# Patient Record
Sex: FEMALE | Race: WHITE | NOT HISPANIC OR LATINO | URBAN - METROPOLITAN AREA
[De-identification: names, ages, dates, MRNs, and addresses within clinical notes are randomized per-mention and may not be internally consistent; named-entity substitution may affect disease eponyms.]

---

## 2020-04-16 ENCOUNTER — INPATIENT (INPATIENT)
Facility: HOSPITAL | Age: 60
LOS: 8 days | Discharge: HOME HEALTH | DRG: 137 | End: 2020-04-25
Attending: INTERNAL MEDICINE | Admitting: INTERNAL MEDICINE
Payer: MEDICAID

## 2020-04-16 VITALS
OXYGEN SATURATION: 91 % | DIASTOLIC BLOOD PRESSURE: 98 MMHG | HEART RATE: 89 BPM | SYSTOLIC BLOOD PRESSURE: 132 MMHG | WEIGHT: 169.98 LBS | TEMPERATURE: 98 F | RESPIRATION RATE: 18 BRPM | HEIGHT: 67 IN

## 2020-04-16 DIAGNOSIS — J12.9 VIRAL PNEUMONIA, UNSPECIFIED: ICD-10-CM

## 2020-04-16 LAB
ALBUMIN SERPL ELPH-MCNC: 2.3 G/DL — LOW (ref 3.3–5)
ALBUMIN SERPL ELPH-MCNC: 2.5 G/DL — LOW (ref 3.3–5)
ALP SERPL-CCNC: 81 U/L — SIGNIFICANT CHANGE UP (ref 40–120)
ALP SERPL-CCNC: 82 U/L — SIGNIFICANT CHANGE UP (ref 40–120)
ALT FLD-CCNC: 62 U/L — SIGNIFICANT CHANGE UP (ref 12–78)
ALT FLD-CCNC: 68 U/L — SIGNIFICANT CHANGE UP (ref 12–78)
ANION GAP SERPL CALC-SCNC: 6 MMOL/L — SIGNIFICANT CHANGE UP (ref 5–17)
ANION GAP SERPL CALC-SCNC: 8 MMOL/L — SIGNIFICANT CHANGE UP (ref 5–17)
APTT BLD: 38.7 SEC — HIGH (ref 27.5–36.3)
AST SERPL-CCNC: 78 U/L — HIGH (ref 15–37)
AST SERPL-CCNC: 80 U/L — HIGH (ref 15–37)
BASE EXCESS BLDV CALC-SCNC: -0.1 MMOL/L — SIGNIFICANT CHANGE UP (ref -2–2)
BASOPHILS # BLD AUTO: 0 K/UL — SIGNIFICANT CHANGE UP (ref 0–0.2)
BASOPHILS NFR BLD AUTO: 0 % — SIGNIFICANT CHANGE UP (ref 0–2)
BILIRUB SERPL-MCNC: 0.3 MG/DL — SIGNIFICANT CHANGE UP (ref 0.2–1.2)
BILIRUB SERPL-MCNC: 0.4 MG/DL — SIGNIFICANT CHANGE UP (ref 0.2–1.2)
BUN SERPL-MCNC: 10 MG/DL — SIGNIFICANT CHANGE UP (ref 7–23)
BUN SERPL-MCNC: 7 MG/DL — SIGNIFICANT CHANGE UP (ref 7–23)
CALCIUM SERPL-MCNC: 7.9 MG/DL — LOW (ref 8.5–10.1)
CALCIUM SERPL-MCNC: 8 MG/DL — LOW (ref 8.5–10.1)
CHLORIDE SERPL-SCNC: 89 MMOL/L — LOW (ref 96–108)
CHLORIDE SERPL-SCNC: 93 MMOL/L — LOW (ref 96–108)
CK SERPL-CCNC: 209 U/L — HIGH (ref 26–192)
CO2 SERPL-SCNC: 25 MMOL/L — SIGNIFICANT CHANGE UP (ref 22–31)
CO2 SERPL-SCNC: 26 MMOL/L — SIGNIFICANT CHANGE UP (ref 22–31)
CREAT SERPL-MCNC: 0.58 MG/DL — SIGNIFICANT CHANGE UP (ref 0.5–1.3)
CREAT SERPL-MCNC: 0.69 MG/DL — SIGNIFICANT CHANGE UP (ref 0.5–1.3)
CRP SERPL-MCNC: 21.49 MG/DL — HIGH (ref 0–0.4)
D DIMER BLD IA.RAPID-MCNC: 165 NG/ML DDU — SIGNIFICANT CHANGE UP
EOSINOPHIL # BLD AUTO: 0 K/UL — SIGNIFICANT CHANGE UP (ref 0–0.5)
EOSINOPHIL NFR BLD AUTO: 0 % — SIGNIFICANT CHANGE UP (ref 0–6)
FERRITIN SERPL-MCNC: 1090 NG/ML — HIGH (ref 15–150)
FIBRINOGEN PPP-MCNC: 1062 MG/DL — HIGH (ref 320–520)
GLUCOSE SERPL-MCNC: 103 MG/DL — HIGH (ref 70–99)
GLUCOSE SERPL-MCNC: 113 MG/DL — HIGH (ref 70–99)
HCO3 BLDV-SCNC: 24 MMOL/L — SIGNIFICANT CHANGE UP (ref 21–29)
HCT VFR BLD CALC: 36.7 % — SIGNIFICANT CHANGE UP (ref 34.5–45)
HCT VFR BLD CALC: 37.2 % — SIGNIFICANT CHANGE UP (ref 34.5–45)
HCV AB S/CO SERPL IA: 0.14 S/CO — SIGNIFICANT CHANGE UP (ref 0–0.99)
HCV AB SERPL-IMP: SIGNIFICANT CHANGE UP
HGB BLD-MCNC: 12.4 G/DL — SIGNIFICANT CHANGE UP (ref 11.5–15.5)
HGB BLD-MCNC: 12.5 G/DL — SIGNIFICANT CHANGE UP (ref 11.5–15.5)
INR BLD: 1.19 RATIO — HIGH (ref 0.88–1.16)
LACTATE SERPL-SCNC: 1.4 MMOL/L — SIGNIFICANT CHANGE UP (ref 0.7–2)
LDH SERPL L TO P-CCNC: 494 U/L — HIGH (ref 84–241)
LYMPHOCYTES # BLD AUTO: 0.36 K/UL — LOW (ref 1–3.3)
LYMPHOCYTES # BLD AUTO: 4 % — LOW (ref 13–44)
MAGNESIUM SERPL-MCNC: 2.1 MG/DL — SIGNIFICANT CHANGE UP (ref 1.6–2.6)
MCHC RBC-ENTMCNC: 28.4 PG — SIGNIFICANT CHANGE UP (ref 27–34)
MCHC RBC-ENTMCNC: 29.2 PG — SIGNIFICANT CHANGE UP (ref 27–34)
MCHC RBC-ENTMCNC: 33.3 GM/DL — SIGNIFICANT CHANGE UP (ref 32–36)
MCHC RBC-ENTMCNC: 34.1 GM/DL — SIGNIFICANT CHANGE UP (ref 32–36)
MCV RBC AUTO: 85.1 FL — SIGNIFICANT CHANGE UP (ref 80–100)
MCV RBC AUTO: 85.7 FL — SIGNIFICANT CHANGE UP (ref 80–100)
MONOCYTES # BLD AUTO: 0.36 K/UL — SIGNIFICANT CHANGE UP (ref 0–0.9)
MONOCYTES NFR BLD AUTO: 4 % — SIGNIFICANT CHANGE UP (ref 2–14)
NEUTROPHILS # BLD AUTO: 8.02 K/UL — HIGH (ref 1.8–7.4)
NEUTROPHILS NFR BLD AUTO: 89 % — HIGH (ref 43–77)
NRBC # BLD: SIGNIFICANT CHANGE UP /100 WBCS (ref 0–0)
PCO2 BLDV: 38 MMHG — SIGNIFICANT CHANGE UP (ref 35–50)
PH BLDV: 7.41 — SIGNIFICANT CHANGE UP (ref 7.35–7.45)
PHOSPHATE SERPL-MCNC: 3 MG/DL — SIGNIFICANT CHANGE UP (ref 2.5–4.5)
PLATELET # BLD AUTO: 218 K/UL — SIGNIFICANT CHANGE UP (ref 150–400)
PLATELET # BLD AUTO: 219 K/UL — SIGNIFICANT CHANGE UP (ref 150–400)
PO2 BLDV: 35 MMHG — SIGNIFICANT CHANGE UP (ref 25–45)
POTASSIUM SERPL-MCNC: 4.1 MMOL/L — SIGNIFICANT CHANGE UP (ref 3.5–5.3)
POTASSIUM SERPL-MCNC: 4.2 MMOL/L — SIGNIFICANT CHANGE UP (ref 3.5–5.3)
POTASSIUM SERPL-SCNC: 4.1 MMOL/L — SIGNIFICANT CHANGE UP (ref 3.5–5.3)
POTASSIUM SERPL-SCNC: 4.2 MMOL/L — SIGNIFICANT CHANGE UP (ref 3.5–5.3)
PROCALCITONIN SERPL-MCNC: 6.89 NG/ML — HIGH (ref 0.02–0.1)
PROT SERPL-MCNC: 6.9 GM/DL — SIGNIFICANT CHANGE UP (ref 6–8.3)
PROT SERPL-MCNC: 7.1 GM/DL — SIGNIFICANT CHANGE UP (ref 6–8.3)
PROTHROM AB SERPL-ACNC: 13.3 SEC — HIGH (ref 10–12.9)
RBC # BLD: 4.28 M/UL — SIGNIFICANT CHANGE UP (ref 3.8–5.2)
RBC # BLD: 4.37 M/UL — SIGNIFICANT CHANGE UP (ref 3.8–5.2)
RBC # FLD: 13.1 % — SIGNIFICANT CHANGE UP (ref 10.3–14.5)
RBC # FLD: 13.2 % — SIGNIFICANT CHANGE UP (ref 10.3–14.5)
SAO2 % BLDV: 61 % — LOW (ref 67–88)
SARS-COV-2 RNA SPEC QL NAA+PROBE: DETECTED
SODIUM SERPL-SCNC: 122 MMOL/L — LOW (ref 135–145)
SODIUM SERPL-SCNC: 125 MMOL/L — LOW (ref 135–145)
TROPONIN I SERPL-MCNC: <0.015 NG/ML — SIGNIFICANT CHANGE UP (ref 0.01–0.04)
WBC # BLD: 8.36 K/UL — SIGNIFICANT CHANGE UP (ref 3.8–10.5)
WBC # BLD: 9.01 K/UL — SIGNIFICANT CHANGE UP (ref 3.8–10.5)
WBC # FLD AUTO: 8.36 K/UL — SIGNIFICANT CHANGE UP (ref 3.8–10.5)
WBC # FLD AUTO: 9.01 K/UL — SIGNIFICANT CHANGE UP (ref 3.8–10.5)

## 2020-04-16 PROCEDURE — 86140 C-REACTIVE PROTEIN: CPT

## 2020-04-16 PROCEDURE — 83036 HEMOGLOBIN GLYCOSYLATED A1C: CPT

## 2020-04-16 PROCEDURE — 84443 ASSAY THYROID STIM HORMONE: CPT

## 2020-04-16 PROCEDURE — 85025 COMPLETE CBC W/AUTO DIFF WBC: CPT

## 2020-04-16 PROCEDURE — 83550 IRON BINDING TEST: CPT

## 2020-04-16 PROCEDURE — 83540 ASSAY OF IRON: CPT

## 2020-04-16 PROCEDURE — 84484 ASSAY OF TROPONIN QUANT: CPT

## 2020-04-16 PROCEDURE — 83880 ASSAY OF NATRIURETIC PEPTIDE: CPT

## 2020-04-16 PROCEDURE — 84466 ASSAY OF TRANSFERRIN: CPT

## 2020-04-16 PROCEDURE — 83519 RIA NONANTIBODY: CPT

## 2020-04-16 PROCEDURE — 86803 HEPATITIS C AB TEST: CPT

## 2020-04-16 PROCEDURE — 80053 COMPREHEN METABOLIC PANEL: CPT

## 2020-04-16 PROCEDURE — 84145 PROCALCITONIN (PCT): CPT

## 2020-04-16 PROCEDURE — 82728 ASSAY OF FERRITIN: CPT

## 2020-04-16 PROCEDURE — 99223 1ST HOSP IP/OBS HIGH 75: CPT

## 2020-04-16 PROCEDURE — 84439 ASSAY OF FREE THYROXINE: CPT

## 2020-04-16 PROCEDURE — 83520 IMMUNOASSAY QUANT NOS NONAB: CPT

## 2020-04-16 PROCEDURE — 71045 X-RAY EXAM CHEST 1 VIEW: CPT

## 2020-04-16 PROCEDURE — 36415 COLL VENOUS BLD VENIPUNCTURE: CPT

## 2020-04-16 PROCEDURE — 71045 X-RAY EXAM CHEST 1 VIEW: CPT | Mod: 26

## 2020-04-16 PROCEDURE — 94640 AIRWAY INHALATION TREATMENT: CPT

## 2020-04-16 PROCEDURE — 85027 COMPLETE CBC AUTOMATED: CPT

## 2020-04-16 PROCEDURE — C9113: CPT

## 2020-04-16 PROCEDURE — 85379 FIBRIN DEGRADATION QUANT: CPT

## 2020-04-16 PROCEDURE — 93005 ELECTROCARDIOGRAM TRACING: CPT

## 2020-04-16 PROCEDURE — 84100 ASSAY OF PHOSPHORUS: CPT

## 2020-04-16 PROCEDURE — 83735 ASSAY OF MAGNESIUM: CPT

## 2020-04-16 PROCEDURE — 93010 ELECTROCARDIOGRAM REPORT: CPT

## 2020-04-16 PROCEDURE — 80048 BASIC METABOLIC PNL TOTAL CA: CPT

## 2020-04-16 RX ORDER — ACETAMINOPHEN 500 MG
650 TABLET ORAL EVERY 4 HOURS
Refills: 0 | Status: DISCONTINUED | OUTPATIENT
Start: 2020-04-16 | End: 2020-04-25

## 2020-04-16 RX ORDER — PANTOPRAZOLE SODIUM 20 MG/1
40 TABLET, DELAYED RELEASE ORAL ONCE
Refills: 0 | Status: COMPLETED | OUTPATIENT
Start: 2020-04-16 | End: 2020-04-16

## 2020-04-16 RX ORDER — ALBUTEROL 90 UG/1
2 AEROSOL, METERED ORAL EVERY 4 HOURS
Refills: 0 | Status: DISCONTINUED | OUTPATIENT
Start: 2020-04-16 | End: 2020-04-25

## 2020-04-16 RX ORDER — LEVOTHYROXINE SODIUM 125 MCG
112 TABLET ORAL DAILY
Refills: 0 | Status: DISCONTINUED | OUTPATIENT
Start: 2020-04-16 | End: 2020-04-25

## 2020-04-16 RX ORDER — ENOXAPARIN SODIUM 100 MG/ML
40 INJECTION SUBCUTANEOUS DAILY
Refills: 0 | Status: DISCONTINUED | OUTPATIENT
Start: 2020-04-16 | End: 2020-04-25

## 2020-04-16 RX ORDER — HYDROXYCHLOROQUINE SULFATE 200 MG
800 TABLET ORAL EVERY 24 HOURS
Refills: 0 | Status: COMPLETED | OUTPATIENT
Start: 2020-04-16 | End: 2020-04-16

## 2020-04-16 RX ORDER — ACETAMINOPHEN 500 MG
650 TABLET ORAL EVERY 6 HOURS
Refills: 0 | Status: DISCONTINUED | OUTPATIENT
Start: 2020-04-16 | End: 2020-04-16

## 2020-04-16 RX ORDER — SODIUM CHLORIDE 9 MG/ML
500 INJECTION INTRAMUSCULAR; INTRAVENOUS; SUBCUTANEOUS ONCE
Refills: 0 | Status: COMPLETED | OUTPATIENT
Start: 2020-04-16 | End: 2020-04-16

## 2020-04-16 RX ORDER — ALBUTEROL 90 UG/1
2 AEROSOL, METERED ORAL EVERY 6 HOURS
Refills: 0 | Status: DISCONTINUED | OUTPATIENT
Start: 2020-04-16 | End: 2020-04-16

## 2020-04-16 RX ORDER — HYDROXYCHLOROQUINE SULFATE 200 MG
TABLET ORAL
Refills: 0 | Status: COMPLETED | OUTPATIENT
Start: 2020-04-16 | End: 2020-04-20

## 2020-04-16 RX ORDER — PANTOPRAZOLE SODIUM 20 MG/1
40 TABLET, DELAYED RELEASE ORAL DAILY
Refills: 0 | Status: DISCONTINUED | OUTPATIENT
Start: 2020-04-16 | End: 2020-04-18

## 2020-04-16 RX ORDER — ONDANSETRON 8 MG/1
4 TABLET, FILM COATED ORAL ONCE
Refills: 0 | Status: COMPLETED | OUTPATIENT
Start: 2020-04-16 | End: 2020-04-16

## 2020-04-16 RX ORDER — ACETAMINOPHEN 500 MG
1000 TABLET ORAL ONCE
Refills: 0 | Status: COMPLETED | OUTPATIENT
Start: 2020-04-16 | End: 2020-04-16

## 2020-04-16 RX ORDER — ONDANSETRON 8 MG/1
4 TABLET, FILM COATED ORAL EVERY 6 HOURS
Refills: 0 | Status: DISCONTINUED | OUTPATIENT
Start: 2020-04-16 | End: 2020-04-25

## 2020-04-16 RX ORDER — HYDROXYCHLOROQUINE SULFATE 200 MG
400 TABLET ORAL EVERY 24 HOURS
Refills: 0 | Status: COMPLETED | OUTPATIENT
Start: 2020-04-17 | End: 2020-04-20

## 2020-04-16 RX ADMIN — Medication 1000 MILLIGRAM(S): at 04:48

## 2020-04-16 RX ADMIN — ONDANSETRON 4 MILLIGRAM(S): 8 TABLET, FILM COATED ORAL at 05:23

## 2020-04-16 RX ADMIN — PANTOPRAZOLE SODIUM 40 MILLIGRAM(S): 20 TABLET, DELAYED RELEASE ORAL at 10:10

## 2020-04-16 RX ADMIN — Medication 650 MILLIGRAM(S): at 11:50

## 2020-04-16 RX ADMIN — Medication 100 MILLIGRAM(S): at 03:15

## 2020-04-16 RX ADMIN — SODIUM CHLORIDE 500 MILLILITER(S): 9 INJECTION INTRAMUSCULAR; INTRAVENOUS; SUBCUTANEOUS at 05:09

## 2020-04-16 RX ADMIN — ENOXAPARIN SODIUM 40 MILLIGRAM(S): 100 INJECTION SUBCUTANEOUS at 10:10

## 2020-04-16 RX ADMIN — Medication 650 MILLIGRAM(S): at 22:09

## 2020-04-16 RX ADMIN — PANTOPRAZOLE SODIUM 40 MILLIGRAM(S): 20 TABLET, DELAYED RELEASE ORAL at 05:23

## 2020-04-16 RX ADMIN — ONDANSETRON 4 MILLIGRAM(S): 8 TABLET, FILM COATED ORAL at 10:54

## 2020-04-16 RX ADMIN — Medication 112 MICROGRAM(S): at 05:50

## 2020-04-16 RX ADMIN — ONDANSETRON 4 MILLIGRAM(S): 8 TABLET, FILM COATED ORAL at 03:15

## 2020-04-16 RX ADMIN — Medication 100 MILLIGRAM(S): at 10:09

## 2020-04-16 RX ADMIN — Medication 800 MILLIGRAM(S): at 04:48

## 2020-04-16 RX ADMIN — Medication 650 MILLIGRAM(S): at 18:12

## 2020-04-16 NOTE — H&P ADULT - ASSESSMENT
61 yo Female presented with shortness of breath.    A/P:    1.  Acute Hypoxic Respiratory failure  Viral Pneumonia secondary to Novel Coronavirus Infection    - Maintain on airborne isolation.  - Continue with O2 as needed via nasal cannula and up-titrate as needed. If on non-rebreather mask, start continuous oximetry monitoring.  - Obtain daily room air O2 saturations once O2 requirements stabilize.  - Started hydroxychloroquine, with plan for 5 day course. EKG reviewed on admission and QTc < 500 msec. No need for further cardiac monitoring.  - Acetaminophen 650 mg PO q4h PRN fever. Limit use of NSAIDs.  - HFA albuterol Q6 hour PRN via MDI. Would avoid nebulized preparations to limit risk of aerosol formation.  - Defer systemic steroids/interleukin inhibitor at this time; would consider if inflammatory markers are elevated and patient has worsening hypoxia.  - Labs Testing: Daily or As Needed  - Start pharmacologic DVT PPx: Lovenox 40 mg SQ daily   - Goals of Care discussion had with patient: Patient is full code.     2.  Hypothyroidism  -on levothyroxine    3.  Lovenox for DVT ppx.

## 2020-04-16 NOTE — H&P ADULT - NEUROLOGICAL DETAILS
alert and oriented x 3/cranial nerves intact/responds to verbal commands/sensation intact/deep reflexes intact/responds to pain/normal strength

## 2020-04-16 NOTE — H&P ADULT - NSHPPHYSICALEXAM_GEN_ALL_CORE
Vital Signs Last 24 Hrs  T(C): 36.4 (16 Apr 2020 05:08), Max: 36.8 (16 Apr 2020 01:59)  T(F): 97.5 (16 Apr 2020 05:08), Max: 98.2 (16 Apr 2020 01:59)  HR: 83 (16 Apr 2020 05:08) (83 - 89)  BP: 126/82 (16 Apr 2020 05:08) (126/82 - 132/98)  RR: 20 (16 Apr 2020 05:08) (18 - 22)  SpO2: 93% (16 Apr 2020 05:08) (91% - 96%)

## 2020-04-16 NOTE — ED PROVIDER NOTE - CLINICAL SUMMARY MEDICAL DECISION MAKING FREE TEXT BOX
Pt p/w clinical features of Covid 19 pneumonia and hypoxia.  Plan: supplemental O2, EKG, CXR, labs and admission

## 2020-04-16 NOTE — ED ADULT NURSE NOTE - OBJECTIVE STATEMENT
patient axox3, c/o cough, fever, nausea, vomiting, increasing weakness, SOB and chest pain progressively worsening since 4/6/2020. patient states she checked her pulse ox at home PTA with a result in the 80s. patient now saturating 95% on 3L NC. color good, skin warm and dry, respirations tachypneic to high 20s. patient also c/o nausea and midepigastric discomfort. patient denies diarrhea. patient states she was tested outpatient yesterday but did not receive results yet.

## 2020-04-16 NOTE — H&P ADULT - HISTORY OF PRESENT ILLNESS
59 y/o Female with h/o thyroid CA s/p thyroidectomy presented with cough, fever, shortness of breath and chest pain progressively worsening since 4/7/2020.  Patient also c/o nausea and midepigastric discomfort. Patient denies leg pain/swelling. Patient has a pulse oximeter at home and noted O2 sats in the upper 80s over the last 24 hours. Patient was having persistent fever for the last 8-9 days at home. But her breathing got much worse in the last 24 hours, which prompted her ER Visit today. No recent travel. Her  is also sick at home.

## 2020-04-16 NOTE — ED PROVIDER NOTE - OBJECTIVE STATEMENT
61 y/o F with h/o thyroid CA s/p thyroidectomy p/w cough, f/c/r, SOB and chest pain progressively worsening since 4/7/2020.  Pt also c/o nausea and midepigastric discomfort.  Pt denies leg pain/swelling. 61 y/o F with h/o thyroid CA s/p thyroidectomy p/w cough, f/c/r, SOB and chest pain progressively worsening since 4/7/2020.  Pt also c/o nausea and midepigastric discomfort.  Pt denies leg pain/swelling. Pt has a pulse oximeter at home and noted O2 sats in the upper 80s over the last 24 hours.

## 2020-04-17 LAB
ANION GAP SERPL CALC-SCNC: 7 MMOL/L — SIGNIFICANT CHANGE UP (ref 5–17)
BUN SERPL-MCNC: 7 MG/DL — SIGNIFICANT CHANGE UP (ref 7–23)
CALCIUM SERPL-MCNC: 8.3 MG/DL — LOW (ref 8.5–10.1)
CHLORIDE SERPL-SCNC: 100 MMOL/L — SIGNIFICANT CHANGE UP (ref 96–108)
CO2 SERPL-SCNC: 28 MMOL/L — SIGNIFICANT CHANGE UP (ref 22–31)
CREAT SERPL-MCNC: 0.86 MG/DL — SIGNIFICANT CHANGE UP (ref 0.5–1.3)
GLUCOSE SERPL-MCNC: 99 MG/DL — SIGNIFICANT CHANGE UP (ref 70–99)
POTASSIUM SERPL-MCNC: 4 MMOL/L — SIGNIFICANT CHANGE UP (ref 3.5–5.3)
POTASSIUM SERPL-SCNC: 4 MMOL/L — SIGNIFICANT CHANGE UP (ref 3.5–5.3)
SODIUM SERPL-SCNC: 135 MMOL/L — SIGNIFICANT CHANGE UP (ref 135–145)

## 2020-04-17 PROCEDURE — 99233 SBSQ HOSP IP/OBS HIGH 50: CPT

## 2020-04-17 RX ORDER — FUROSEMIDE 40 MG
20 TABLET ORAL
Refills: 0 | Status: COMPLETED | OUTPATIENT
Start: 2020-04-17 | End: 2020-04-18

## 2020-04-17 RX ORDER — CEFTRIAXONE 500 MG/1
1000 INJECTION, POWDER, FOR SOLUTION INTRAMUSCULAR; INTRAVENOUS EVERY 24 HOURS
Refills: 0 | Status: COMPLETED | OUTPATIENT
Start: 2020-04-17 | End: 2020-04-21

## 2020-04-17 RX ADMIN — Medication 20 MILLIGRAM(S): at 12:45

## 2020-04-17 RX ADMIN — Medication 100 MILLIGRAM(S): at 17:00

## 2020-04-17 RX ADMIN — CEFTRIAXONE 1000 MILLIGRAM(S): 500 INJECTION, POWDER, FOR SOLUTION INTRAMUSCULAR; INTRAVENOUS at 12:45

## 2020-04-17 RX ADMIN — PANTOPRAZOLE SODIUM 40 MILLIGRAM(S): 20 TABLET, DELAYED RELEASE ORAL at 11:58

## 2020-04-17 RX ADMIN — Medication 650 MILLIGRAM(S): at 20:51

## 2020-04-17 RX ADMIN — Medication 650 MILLIGRAM(S): at 08:17

## 2020-04-17 RX ADMIN — Medication 112 MICROGRAM(S): at 04:29

## 2020-04-17 RX ADMIN — Medication 60 MILLIGRAM(S): at 20:51

## 2020-04-17 RX ADMIN — Medication 60 MILLIGRAM(S): at 11:58

## 2020-04-17 RX ADMIN — Medication 20 MILLIGRAM(S): at 20:51

## 2020-04-17 RX ADMIN — Medication 400 MILLIGRAM(S): at 04:29

## 2020-04-17 RX ADMIN — ENOXAPARIN SODIUM 40 MILLIGRAM(S): 100 INJECTION SUBCUTANEOUS at 11:58

## 2020-04-17 NOTE — PROGRESS NOTE ADULT - ASSESSMENT
61 yo Female presented with shortness of breath.    A/P:    1.  Acute Hypoxic Respiratory failure  Viral Pneumonia secondary to Novel Coronavirus Infection    - Maintain on airborne isolation.  - Continue with O2 as needed via nasal cannula and up-titrate as needed. If on non-rebreather mask, start continuous oximetry monitoring.  - Obtain daily room air O2 saturations once O2 requirements stabilize.  - Started hydroxychloroquine, with plan for 5 day course. EKG reviewed on admission and QTc < 500 msec. No need for further cardiac monitoring.  - Acetaminophen 650 mg PO q4h PRN fever. Limit use of NSAIDs.  - HFA albuterol Q6 hour PRN via MDI. Would avoid nebulized preparations to limit risk of aerosol formation.  - Defer systemic steroids/interleukin inhibitor at this time; would consider if inflammatory markers are elevated and patient has worsening hypoxia.  - Labs Testing: Daily or As Needed  - Start pharmacologic DVT PPx: Lovenox 40 mg SQ daily   - Goals of Care discussion had with patient: Patient is full code.     2.  Hypothyroidism  -on levothyroxine    3.  Lovenox for DVT ppx. 59 yo Female presented with shortness of breath.    A/P:    Acute Hypoxic Respiratory failure / Viral Pneumonia secondary to Novel Coronavirus (COVID19) Infection / suspected superimposed bacterial pna    - Maintain on airborne isolation  - supplemental O2  - hydroxychloroquine per protocol   - Acetaminophen 650 mg PO q4h PRN fever. Limit use of NSAIDs.  - HFA albuterol Q6 hour PRN via MDI.   - start solu-medrol  - start ceftriaxone and azithromycin for probable gram neg/atypical PNA    Hypothyroidism  -Levothyroxine    Hyponatremia: RESOLVED    Lovenox for DVT ppx.     full code.     Pt seen and examined with JASMINA Hauser. I personally had a face to face encounter with the patient, examined the patient myself and reviewed the plan of care with the patient and said JASMINA Hauser. I agree with the assessment and plan of JASMINA Hauser as stated and discussed.

## 2020-04-17 NOTE — PROGRESS NOTE ADULT - SUBJECTIVE AND OBJECTIVE BOX
61 y/o Female with h/o thyroid CA s/p thyroidectomy presented with cough, fever, shortness of breath and chest pain progressively worsening since 4/7/2020.  Patient also c/o nausea and midepigastric discomfort. Patient denies leg pain/swelling. Patient has a pulse oximeter at home and noted O2 sats in the upper 80s over the last 24 hours. Patient was having persistent fever for the last 8-9 days at home. But her breathing got much worse in the last 24 hours, which prompted her ER Visit today. No recent travel. Her  is also sick at home.     4/17/20: Pt seen and examined bedside. Pt febrile this morning up to 100.7. Satting 91% on NRB. Pt was lying prone.     Vital Signs Last 24 Hrs  T(C): 36.1 (17 Apr 2020 12:02), Max: 38.2 (17 Apr 2020 08:18)  T(F): 97 (17 Apr 2020 12:02), Max: 100.7 (17 Apr 2020 08:18)  HR: 99 (17 Apr 2020 12:02) (86 - 99)  BP: 122/73 (17 Apr 2020 12:02) (107/61 - 122/73)  BP(mean): --  RR: 24 (17 Apr 2020 12:02) (20 - 24)  SpO2: 91% (17 Apr 2020 12:02) (90% - 96%)                          12.5   8.36  )-----------( 219      ( 16 Apr 2020 07:56 )             36.7     04-17    135  |  100  |  7   ----------------------------<  99  4.0   |  28  |  0.86    Ca    8.3<L>      17 Apr 2020 08:11  Phos  3.0     04-16  Mg     2.1     04-16    TPro  6.9  /  Alb  2.3<L>  /  TBili  0.3  /  DBili  x   /  AST  78<H>  /  ALT  62  /  AlkPhos  82  04-16 CC: SOB    HPI/Subjective:  59 y/o Female with h/o thyroid CA s/p thyroidectomy presented with cough, fever, shortness of breath and chest pain progressively worsening since 4/7/2020.  Patient also c/o nausea and midepigastric discomfort. Patient denies leg pain/swelling. Patient has a pulse oximeter at home and noted O2 sats in the upper 80s over the last 24 hours. Patient was having persistent fever for the last 8-9 days at home. But her breathing got much worse in the last 24 hours, which prompted her ER Visit today. No recent travel. Her  is also sick at home.     4/17/20: Pt seen and examined bedside. Pt febrile this morning up to 100.7. Satting 91% on NRB. Pt was lying prone.     REVIEW OF SYSTEMS: All other review of systems is negative unless indicated above.    Vital Signs Last 24 Hrs  T(C): 36.1 (17 Apr 2020 12:02), Max: 38.2 (17 Apr 2020 08:18)  T(F): 97 (17 Apr 2020 12:02), Max: 100.7 (17 Apr 2020 08:18)  HR: 99 (17 Apr 2020 12:02) (86 - 99)  BP: 122/73 (17 Apr 2020 12:02) (107/61 - 122/73)  BP(mean): --  RR: 24 (17 Apr 2020 12:02) (20 - 24)  SpO2: 91% (17 Apr 2020 12:02) (90% - 96%)                          12.5   8.36  )-----------( 219      ( 16 Apr 2020 07:56 )             36.7     04-17    135  |  100  |  7   ----------------------------<  99  4.0   |  28  |  0.86    Ca    8.3<L>      17 Apr 2020 08:11  Phos  3.0     04-16  Mg     2.1     04-16    TPro  6.9  /  Alb  2.3<L>  /  TBili  0.3  /  DBili  x   /  AST  78<H>  /  ALT  62  /  AlkPhos  82  04-16      Vital Signs Last 24 Hrs  T(C): 36.1 (17 Apr 2020 12:02), Max: 38.2 (17 Apr 2020 08:18)  T(F): 97 (17 Apr 2020 12:02), Max: 100.7 (17 Apr 2020 08:18)  HR: 99 (17 Apr 2020 12:02) (86 - 99)  BP: 122/73 (17 Apr 2020 12:02) (107/61 - 122/73)  BP(mean): --  RR: 24 (17 Apr 2020 12:02) (20 - 24)  SpO2: 91% (17 Apr 2020 12:02) (90% - 96%)    PHYSICAL EXAM:    Constitutional: NAD, awake and alert, well-developed  HEENT: PERR, EOMI, Normal Hearing, MMM  Neck: Soft and supple  Respiratory: Breath sounds are clear bilaterally, No wheezing, rales or rhonchi  Cardiovascular: S1 and S2, regular rate and rhythm, no Murmurs, gallops or rubs  Gastrointestinal: Bowel Sounds present, soft, nontender, nondistended, no guarding, no rebound  Extremities: No peripheral edema  Neurological: A/O x 3, no focal deficits in my limited exam

## 2020-04-18 LAB
ADD ON TEST-SPECIMEN IN LAB: SIGNIFICANT CHANGE UP
ALBUMIN SERPL ELPH-MCNC: 2.4 G/DL — LOW (ref 3.3–5)
ALP SERPL-CCNC: 82 U/L — SIGNIFICANT CHANGE UP (ref 40–120)
ALT FLD-CCNC: 47 U/L — SIGNIFICANT CHANGE UP (ref 12–78)
ANION GAP SERPL CALC-SCNC: 7 MMOL/L — SIGNIFICANT CHANGE UP (ref 5–17)
AST SERPL-CCNC: 52 U/L — HIGH (ref 15–37)
BILIRUB SERPL-MCNC: 0.3 MG/DL — SIGNIFICANT CHANGE UP (ref 0.2–1.2)
BUN SERPL-MCNC: 10 MG/DL — SIGNIFICANT CHANGE UP (ref 7–23)
CALCIUM SERPL-MCNC: 8.7 MG/DL — SIGNIFICANT CHANGE UP (ref 8.5–10.1)
CHLORIDE SERPL-SCNC: 93 MMOL/L — LOW (ref 96–108)
CO2 SERPL-SCNC: 30 MMOL/L — SIGNIFICANT CHANGE UP (ref 22–31)
CREAT SERPL-MCNC: 0.94 MG/DL — SIGNIFICANT CHANGE UP (ref 0.5–1.3)
GLUCOSE SERPL-MCNC: 202 MG/DL — HIGH (ref 70–99)
POTASSIUM SERPL-MCNC: 3.7 MMOL/L — SIGNIFICANT CHANGE UP (ref 3.5–5.3)
POTASSIUM SERPL-SCNC: 3.7 MMOL/L — SIGNIFICANT CHANGE UP (ref 3.5–5.3)
PROT SERPL-MCNC: 8.6 GM/DL — HIGH (ref 6–8.3)
SODIUM SERPL-SCNC: 130 MMOL/L — LOW (ref 135–145)

## 2020-04-18 PROCEDURE — 99232 SBSQ HOSP IP/OBS MODERATE 35: CPT

## 2020-04-18 RX ORDER — ASCORBIC ACID 60 MG
1500 TABLET,CHEWABLE ORAL DAILY
Refills: 0 | Status: DISCONTINUED | OUTPATIENT
Start: 2020-04-18 | End: 2020-04-19

## 2020-04-18 RX ORDER — ASCORBIC ACID 60 MG
1000 TABLET,CHEWABLE ORAL DAILY
Refills: 0 | Status: DISCONTINUED | OUTPATIENT
Start: 2020-04-18 | End: 2020-04-18

## 2020-04-18 RX ORDER — PANTOPRAZOLE SODIUM 20 MG/1
40 TABLET, DELAYED RELEASE ORAL
Refills: 0 | Status: DISCONTINUED | OUTPATIENT
Start: 2020-04-18 | End: 2020-04-25

## 2020-04-18 RX ORDER — SODIUM CHLORIDE 0.65 %
2 AEROSOL, SPRAY (ML) NASAL
Refills: 0 | Status: DISCONTINUED | OUTPATIENT
Start: 2020-04-18 | End: 2020-04-25

## 2020-04-18 RX ORDER — LACTOBACILLUS ACIDOPHILUS 100MM CELL
1 CAPSULE ORAL
Refills: 0 | Status: DISCONTINUED | OUTPATIENT
Start: 2020-04-18 | End: 2020-04-25

## 2020-04-18 RX ORDER — ALBUTEROL 90 UG/1
2 AEROSOL, METERED ORAL EVERY 6 HOURS
Refills: 0 | Status: DISCONTINUED | OUTPATIENT
Start: 2020-04-18 | End: 2020-04-25

## 2020-04-18 RX ADMIN — Medication 80 MILLIGRAM(S): at 22:26

## 2020-04-18 RX ADMIN — Medication 60 MILLIGRAM(S): at 06:25

## 2020-04-18 RX ADMIN — CEFTRIAXONE 1000 MILLIGRAM(S): 500 INJECTION, POWDER, FOR SOLUTION INTRAMUSCULAR; INTRAVENOUS at 09:57

## 2020-04-18 RX ADMIN — ENOXAPARIN SODIUM 40 MILLIGRAM(S): 100 INJECTION SUBCUTANEOUS at 10:55

## 2020-04-18 RX ADMIN — Medication 100 MILLIGRAM(S): at 17:12

## 2020-04-18 RX ADMIN — Medication 2 SPRAY(S): at 17:33

## 2020-04-18 RX ADMIN — Medication 103 MILLIGRAM(S): at 22:26

## 2020-04-18 RX ADMIN — Medication 650 MILLIGRAM(S): at 23:33

## 2020-04-18 RX ADMIN — PANTOPRAZOLE SODIUM 40 MILLIGRAM(S): 20 TABLET, DELAYED RELEASE ORAL at 17:16

## 2020-04-18 RX ADMIN — ALBUTEROL 2 PUFF(S): 90 AEROSOL, METERED ORAL at 22:26

## 2020-04-18 RX ADMIN — Medication 100 MILLIGRAM(S): at 06:24

## 2020-04-18 RX ADMIN — Medication 400 MILLIGRAM(S): at 06:25

## 2020-04-18 RX ADMIN — Medication 1 TABLET(S): at 17:13

## 2020-04-18 RX ADMIN — Medication 40 MILLIGRAM(S): at 17:14

## 2020-04-18 RX ADMIN — Medication 20 MILLIGRAM(S): at 06:25

## 2020-04-18 RX ADMIN — Medication 600 MILLIGRAM(S): at 17:12

## 2020-04-18 RX ADMIN — Medication 2 SPRAY(S): at 22:26

## 2020-04-18 RX ADMIN — Medication 650 MILLIGRAM(S): at 17:32

## 2020-04-18 RX ADMIN — Medication 112 MICROGRAM(S): at 06:25

## 2020-04-18 RX ADMIN — ALBUTEROL 2 PUFF(S): 90 AEROSOL, METERED ORAL at 14:22

## 2020-04-18 RX ADMIN — Medication 2 SPRAY(S): at 17:16

## 2020-04-18 NOTE — CONSULT NOTE ADULT - ASSESSMENT
59 y/o Female with h/o thyroid CA s/p thyroidectomy admitted on 4/16 for evaluation of shortness of breath, fever and cough associated with worsening midepigastric discomfort over the last week; has had fever and chills; no sick contacts and no recent travel; over last 24 hours worsened and came to Ed.    1. Patient admitted with viral syndrome secondary to COVID-19 infection and secondary superimposed pneumonia  - follow up cultures   - serial cbc and monitor temperature   - oxygen and nebs as needed   - reviewed prior medical records to evaluate for resistant or atypical pathogens   - continue isolation   - started on hydroxychloroquine on 4/16 and to continue until 4/20  - started on ceftriaxone and doxycycline on 4/17  2. other issues; per medicine

## 2020-04-18 NOTE — PROGRESS NOTE ADULT - ASSESSMENT
61 yo Female presented with shortness of breath.    A/P:    Acute Hypoxic Respiratory failure / Viral Pneumonia secondary to Novel Coronavirus (COVID19) Infection / suspected superimposed bacterial pna    - Maintain on airborne isolation  - supplemental O2  - hydroxychloroquine per protocol   - Acetaminophen 650 mg PO q4h PRN fever. Limit use of NSAIDs.  - HFA albuterol Q6 hour PRN via MDI.   - start solu-medrol  - start ceftriaxone and azithromycin for probable gram neg/atypical PNA    Hypothyroidism  -Levothyroxine    Hyponatremia: RESOLVED    Lovenox for DVT ppx.     full code.     Pt seen and examined with JASMINA Hauser. I personally had a face to face encounter with the patient, examined the patient myself and reviewed the plan of care with the patient and said JASMINA Hauser. I agree with the assessment and plan of JASMINA Hauser as stated and discussed. 59 y/o Female with h/o thyroid CA s/p thyroidectomy  admitted on 4/16/20  with cough, fever, shortness of breath and chest pain progressively worsening since 4/7/2020.  Patient also c/o nausea and midepigastric discomfort. Patient denies leg pain/swelling. Patient has a pulse oximeter at home and noted O2 sats in the upper 80s over the last 24 hours. Patient was having persistent fever for the last 8-9 days at home. But her breathing got much worse in the last 24 hours, which prompted her ER Visit today. No recent travel. Her  is also sick at home.     A/P:  * Acute Hypoxic Respiratory Failure due to COVID-19 viral pneumonia with suspected superimposed CAP   - isolation precautions,  - O2 supplementation on NRB 96 % , down -titrate  if able to 6-8 L   - baseline procalcitonin 6 ,  CRP 21 , ferritin 1000,  , d-dimers 165 , trend every 48-72 h   - hydroxychloroquine as per protocol 5 days course ( risks and side effects discussed with patient )   - EKG reviewed on admission and QTc < 500 msec. No need for further cardiac monitoring.  - start /continue statin - pravastatin 40 hs  , monitor LFTs   - IV steroids 40 bid with ppi  day #2   - IV ceftriaxone and doxycycline , trial of IV vit C 1.5 gm x 3 days   - antipyretics, mucolytics, beta-agonist inhalers, incentive spirometry,  nasal saline irrigation, supportive care.    - DVT proph - Lovenox 40 mg SQ daily   -  ID consult consult - for need of  initiation of anakinra/tocilizumab/salimumab   * Hypothyroidism  -Levothyroxine   * Hyponatremia hypovolemic  - encourage po liquids, add supplements     Lovenox for DVT ppx.   Advanced directives - Goals of Care discussion had with patient : FULL CODE, 17 minutes spend

## 2020-04-18 NOTE — PROGRESS NOTE ADULT - SUBJECTIVE AND OBJECTIVE BOX
CC: dyspnea, dry cough     HPI/Subjective:     59 y/o Female with h/o thyroid CA s/p thyroidectomy  admitted on 4/16/20  with cough, fever, shortness of breath and chest pain progressively worsening since 4/7/2020.  Patient also c/o nausea and midepigastric discomfort. Patient denies leg pain/swelling. Patient has a pulse oximeter at home and noted O2 sats in the upper 80s over the last 24 hours. Patient was having persistent fever for the last 8-9 days at home. But her breathing got much worse in the last 24 hours, which prompted her ER Visit today. No recent travel. Her  is also sick at home.     4/17/20: Pt seen and examined bedside. Pt febrile this morning up to 100.7. Satting 91% on NRB. Pt was lying prone.     REVIEW OF SYSTEMS: All other review of systems is negative unless indicated above.    Vital Signs Last 24 Hrs  T(C): 36.1 (17 Apr 2020 12:02), Max: 38.2 (17 Apr 2020 08:18)  T(F): 97 (17 Apr 2020 12:02), Max: 100.7 (17 Apr 2020 08:18)  HR: 99 (17 Apr 2020 12:02) (86 - 99)  BP: 122/73 (17 Apr 2020 12:02) (107/61 - 122/73)  BP(mean): --  RR: 24 (17 Apr 2020 12:02) (20 - 24)  SpO2: 91% (17 Apr 2020 12:02) (90% - 96%)                          12.5   8.36  )-----------( 219      ( 16 Apr 2020 07:56 )             36.7     04-17    135  |  100  |  7   ----------------------------<  99  4.0   |  28  |  0.86    Ca    8.3<L>      17 Apr 2020 08:11  Phos  3.0     04-16  Mg     2.1     04-16    TPro  6.9  /  Alb  2.3<L>  /  TBili  0.3  /  DBili  x   /  AST  78<H>  /  ALT  62  /  AlkPhos  82  04-16      Vital Signs Last 24 Hrs  T(C): 36.1 (17 Apr 2020 12:02), Max: 38.2 (17 Apr 2020 08:18)  T(F): 97 (17 Apr 2020 12:02), Max: 100.7 (17 Apr 2020 08:18)  HR: 99 (17 Apr 2020 12:02) (86 - 99)  BP: 122/73 (17 Apr 2020 12:02) (107/61 - 122/73)  BP(mean): --  RR: 24 (17 Apr 2020 12:02) (20 - 24)  SpO2: 91% (17 Apr 2020 12:02) (90% - 96%)    PHYSICAL EXAM:    Constitutional: NAD, awake and alert, well-developed  HEENT: PERR, EOMI, Normal Hearing, MMM  Neck: Soft and supple  Respiratory: Breath sounds are clear bilaterally, No wheezing, rales or rhonchi  Cardiovascular: S1 and S2, regular rate and rhythm, no Murmurs, gallops or rubs  Gastrointestinal: Bowel Sounds present, soft, nontender, nondistended, no guarding, no rebound  Extremities: No peripheral edema  Neurological: A/O x 3, no focal deficits in my limited exam CC: dyspnea, dry cough     HPI/Subjective:     59 y/o Female with h/o thyroid CA s/p thyroidectomy  admitted on 4/16/20  with cough, fever, shortness of breath and chest pain progressively worsening since 4/7/2020.  Patient also c/o nausea and midepigastric discomfort. Patient denies leg pain/swelling. Patient has a pulse oximeter at home and noted O2 sats in the upper 80s over the last 24 hours. Patient was having persistent fever for the last 8-9 days at home. But her breathing got much worse in the last 24 hours, which prompted her ER Visit today. No recent travel. Her  is also sick at home.     4/17/20: Pt seen and examined bedside. Pt febrile this morning up to 100.7. Satting 91% on NRB. Pt was lying prone.   4/18 - patient seen and examined , + cough dry, + dyspnea , + lose bm , poor po intake, + dry throat and nose, denies cp, abdominal pain, POC discussed     REVIEW OF SYSTEMS: All other review of systems is negative unless indicated above.    T(C): 36.6 (04-18-20 @ 09:53), Max: 36.8 (04-17-20 @ 20:52)  T(F): 97.8 (04-18-20 @ 09:53), Max: 98.2 (04-17-20 @ 20:52)  HR: 87 (04-18-20 @ 09:53) (86 - 87)  BP: 120/64 (04-18-20 @ 09:53) (120/64 - 123/85)  RR: 20 (04-18-20 @ 08:09) (20 - 23)  SpO2: 96% (04-18-20 @ 08:09) (94% - 96%)  Wt(kg): --      PHYSICAL EXAM:    Constitutional: NAD, awake and alert, well-developed  HEENT: PERR, EOMI, Normal Hearing, MMM  Neck: Soft and supple  Respiratory: Breath sounds decreased at bases,  No wheezing, rales, + rhonchi  Cardiovascular: S1 and S2, regular rate and rhythm, no Murmurs, gallops or rubs  Gastrointestinal: Bowel Sounds present, soft, nontender, nondistended, no guarding, no rebound  Extremities: No peripheral edema  Neurological: A/O x 3, no focal deficits in my limited exam    04-18    130<L>  |  93<L>  |  10  ----------------------------<  202<H>  3.7   |  30  |  0.94    Ca    8.7      18 Apr 2020 08:20    TPro  8.6<H>  /  Alb  2.4<L>  /  TBili  0.3  /  DBili  x   /  AST  52<H>  /  ALT  47  /  AlkPhos  82  04-18        LIVER FUNCTIONS - ( 18 Apr 2020 08:20 )  Alb: 2.4 g/dL / Pro: 8.6 gm/dL / ALK PHOS: 82 U/L / ALT: 47 U/L / AST: 52 U/L / GGT: x         Troponin I, Serum (04.16.20 @ 02:35)    Troponin I, Serum: <0.015: High Sensitivity Troponin and new reference  range effective 7/6/2016 ng/mL    < from: 12 Lead ECG (04.16.20 @ 02:42) >    Ventricular Rate 85 BPM    Atrial Rate 85 BPM    P-R Interval 124 ms    QRS Duration 78 ms    Q-T Interval 360 ms    QTC Calculation(Bezet) 428 ms    P Axis 54 degrees    R Axis 67 degrees    T Axis 2 degrees    Diagnosis Line *** Poor data quality, interpretation may be adversely affected  Normal sinus rhythm  Nonspecific ST and T wave abnormality  Abnormal ECG    < end of copied text >            Culture - Blood (04.16.20 @ 02:35)    Specimen Source: .Blood Blood-Peripheral    Culture Results:   No growth to date.    < from: Xray Chest 1 View-PORTABLE IMMEDIATE (04.16.20 @ 03:17) >  FINDINGS:  Diffuse patchy airspace opacities throughout both lungs. Hemidiaphragms are sharp; no evidence of a pleural effusion. Cardiac silhouette size is exaggerated by AP technique. Mediastinal contours are within normal limits. The regional osseous structures are unremarkable.    IMPRESSION:    Diffuse patchy airspace opacities throughout both lungs.    < end of copied text >  MEDICATIONS  (STANDING):  ALBUTerol    90 MICROgram(s) HFA Inhaler 2 Puff(s) Inhalation every 6 hours  ascorbic acid IVPB 1500 milliGRAM(s) IV Intermittent daily  cefTRIAXone Injectable. 1000 milliGRAM(s) IV Push every 24 hours  doxycycline hyclate Capsule 100 milliGRAM(s) Oral every 12 hours  enoxaparin Injectable 40 milliGRAM(s) SubCutaneous daily  guaiFENesin  milliGRAM(s) Oral every 12 hours  hydroxychloroquine   Oral   hydroxychloroquine 400 milliGRAM(s) Oral every 24 hours  lactobacillus acidophilus 1 Tablet(s) Oral three times a day with meals  levothyroxine 112 MICROGram(s) Oral daily  methylPREDNISolone sodium succinate Injectable 40 milliGRAM(s) IV Push every 12 hours  pantoprazole    Tablet 40 milliGRAM(s) Oral before breakfast  sodium chloride 0.65% Nasal 2 Spray(s) Both Nostrils every 2 hours    MEDICATIONS  (PRN):  acetaminophen   Tablet .. 650 milliGRAM(s) Oral every 4 hours PRN Temp greater or equal to 38C (100.4F), Mild Pain (1 - 3)  ALBUTerol    90 MICROgram(s) HFA Inhaler 2 Puff(s) Inhalation every 4 hours PRN Shortness of Breath and/or Wheezing  hydrocodone/homatropine Syrup 5 milliLiter(s) Oral every 6 hours PRN Cough  ondansetron Injectable 4 milliGRAM(s) IV Push every 6 hours PRN Nausea and/or Vomiting

## 2020-04-18 NOTE — CONSULT NOTE ADULT - SUBJECTIVE AND OBJECTIVE BOX
Patient is a 60y old  Female who presents with a chief complaint of complain of shortness of breath (18 Apr 2020 15:25)    HPI:  61 y/o Female with h/o thyroid CA s/p thyroidectomy admitted on 4/16 for evaluation of shortness of breath, fever and cough associated with worsening midepigastric discomfort over the last week; has had fever and chills; no sick contacts and no recent travel; over last 24 hours worsened and came to Ed.          PMH: as above  PSH: as above  Meds: per reconciliation sheet, noted below  MEDICATIONS  (STANDING):  ALBUTerol    90 MICROgram(s) HFA Inhaler 2 Puff(s) Inhalation every 6 hours  ascorbic acid IVPB 1500 milliGRAM(s) IV Intermittent daily  cefTRIAXone Injectable. 1000 milliGRAM(s) IV Push every 24 hours  doxycycline hyclate Capsule 100 milliGRAM(s) Oral every 12 hours  enoxaparin Injectable 40 milliGRAM(s) SubCutaneous daily  guaiFENesin  milliGRAM(s) Oral every 12 hours  hydroxychloroquine   Oral   hydroxychloroquine 400 milliGRAM(s) Oral every 24 hours  lactobacillus acidophilus 1 Tablet(s) Oral three times a day with meals  levothyroxine 112 MICROGram(s) Oral daily  methylPREDNISolone sodium succinate Injectable 40 milliGRAM(s) IV Push every 12 hours  pantoprazole    Tablet 40 milliGRAM(s) Oral before breakfast  pravastatin 80 milliGRAM(s) Oral at bedtime  sodium chloride 0.65% Nasal 2 Spray(s) Both Nostrils every 2 hours    MEDICATIONS  (PRN):  acetaminophen   Tablet .. 650 milliGRAM(s) Oral every 4 hours PRN Temp greater or equal to 38C (100.4F), Mild Pain (1 - 3)  ALBUTerol    90 MICROgram(s) HFA Inhaler 2 Puff(s) Inhalation every 4 hours PRN Shortness of Breath and/or Wheezing  hydrocodone/homatropine Syrup 5 milliLiter(s) Oral every 6 hours PRN Cough  ondansetron Injectable 4 milliGRAM(s) IV Push every 6 hours PRN Nausea and/or Vomiting    Allergies    No Known Allergies    Intolerances      Social: no smoking, no alcohol, no illegal drugs; no recent travel, no exposure to TB  FAMILY HISTORY:  No pertinent family history in first degree relatives     no history of premature cardiovascular disease in first degree relatives  ROS: the patient denies fever, no chills, no HA, no dizziness, no sore throat, no blurry vision, no CP, no palpitations,  no abdominal pain, no diarrhea, no N/V, no dysuria, no leg pain, no claudication, no rash, no joint aches, no rectal pain or bleeding, no night sweats  All other systems reviewed and are negative    Vital Signs Last 24 Hrs  T(C): 36.6 (18 Apr 2020 09:53), Max: 36.8 (17 Apr 2020 20:52)  T(F): 97.8 (18 Apr 2020 09:53), Max: 98.2 (17 Apr 2020 20:52)  HR: 87 (18 Apr 2020 09:53) (86 - 87)  BP: 120/64 (18 Apr 2020 09:53) (120/64 - 123/85)  BP(mean): --  RR: 20 (18 Apr 2020 08:09) (20 - 23)  SpO2: 96% (18 Apr 2020 08:09) (94% - 96%)  Daily     Daily     PE:    Constitutional: frail looking  HEENT: NC/AT, EOMI, PERRLA, conjunctivae clear; ears and nose atraumatic; pharynx clear  Neck: supple; thyroid not palpable  Back: no tenderness  Respiratory: respiratory effort normal; clear to auscultation  Cardiovascular: S1S2 regular, no murmurs  Abdomen: soft, not tender, not distended, positive BS; no liver or spleen organomegaly  Genitourinary: no suprapubic tenderness  Musculoskeletal: no muscle tenderness, no joint swelling or tenderness  Neurological/ Psychiatric: AxOx3, judgement and insight normal;  moving all extremities  Skin: no rashes; no palpable lesions    Labs: all available labs reviewed    04-18    130<L>  |  93<L>  |  10  ----------------------------<  202<H>  3.7   |  30  |  0.94    Ca    8.7      18 Apr 2020 08:20    TPro  8.6<H>  /  Alb  2.4<L>  /  TBili  0.3  /  DBili  x   /  AST  52<H>  /  ALT  47  /  AlkPhos  82  04-18     LIVER FUNCTIONS - ( 18 Apr 2020 08:20 )  Alb: 2.4 g/dL / Pro: 8.6 gm/dL / ALK PHOS: 82 U/L / ALT: 47 U/L / AST: 52 U/L / GGT: x           COVID-19 PCR . (04.16.20 @ 02:35)    COVID-19 PCR: Detected: Test Name:COVID-19  Called by:salo  Called to:Leigh Ann Mejia  Read back 2 Pt IDs:y Read back values:y Date/Tm:04/16/20 04:11  This test has been validated by [employer] to be accurate;  though it has not been FDA cleared/approved by the usual pathway.  As  with all laboratory tests, results should be correlated with clinical  findings.  https://www.fda.gov/media/677792/download  https://www.fda.gov/media/748733/download      < from: Xray Chest 1 View-PORTABLE IMMEDIATE (04.16.20 @ 03:17) >    EXAM:  XR CHEST PORTABLE IMMED 1V                            PROCEDURE DATE:  04/16/2020          INTERPRETATION:  CLINICAL INFORMATION:  Shortness of Breath, Cough,  Fever    TECHNIQUE:  AP view of the chest.    COMPARISON:  None available.    FINDINGS:  Diffuse patchy airspace opacities throughout both lungs. Hemidiaphragms are sharp; no evidence of a pleural effusion. Cardiac silhouette size is exaggerated by AP technique. Mediastinal contours are within normal limits. The regional osseous structures are unremarkable.    IMPRESSION:    Diffuse patchy airspace opacities throughout both lungs.    < end of copied text >      Radiology: all available radiological tests reviewed    Advanced directives addressed: full resuscitation

## 2020-04-19 LAB
ADD ON TEST-SPECIMEN IN LAB: SIGNIFICANT CHANGE UP
ALBUMIN SERPL ELPH-MCNC: 2.2 G/DL — LOW (ref 3.3–5)
ALP SERPL-CCNC: 65 U/L — SIGNIFICANT CHANGE UP (ref 40–120)
ALT FLD-CCNC: 40 U/L — SIGNIFICANT CHANGE UP (ref 12–78)
ANION GAP SERPL CALC-SCNC: 10 MMOL/L — SIGNIFICANT CHANGE UP (ref 5–17)
AST SERPL-CCNC: 37 U/L — SIGNIFICANT CHANGE UP (ref 15–37)
BASOPHILS # BLD AUTO: 0.02 K/UL — SIGNIFICANT CHANGE UP (ref 0–0.2)
BASOPHILS NFR BLD AUTO: 0.2 % — SIGNIFICANT CHANGE UP (ref 0–2)
BILIRUB SERPL-MCNC: 0.3 MG/DL — SIGNIFICANT CHANGE UP (ref 0.2–1.2)
BUN SERPL-MCNC: 14 MG/DL — SIGNIFICANT CHANGE UP (ref 7–23)
CALCIUM SERPL-MCNC: 8.5 MG/DL — SIGNIFICANT CHANGE UP (ref 8.5–10.1)
CHLORIDE SERPL-SCNC: 96 MMOL/L — SIGNIFICANT CHANGE UP (ref 96–108)
CO2 SERPL-SCNC: 26 MMOL/L — SIGNIFICANT CHANGE UP (ref 22–31)
CREAT SERPL-MCNC: 0.89 MG/DL — SIGNIFICANT CHANGE UP (ref 0.5–1.3)
CRP SERPL-MCNC: 6.67 MG/DL — HIGH (ref 0–0.4)
D DIMER BLD IA.RAPID-MCNC: 156 NG/ML DDU — SIGNIFICANT CHANGE UP
EOSINOPHIL # BLD AUTO: 0 K/UL — SIGNIFICANT CHANGE UP (ref 0–0.5)
EOSINOPHIL NFR BLD AUTO: 0 % — SIGNIFICANT CHANGE UP (ref 0–6)
FERRITIN SERPL-MCNC: 615 NG/ML — HIGH (ref 15–150)
GLUCOSE SERPL-MCNC: 182 MG/DL — HIGH (ref 70–99)
HCT VFR BLD CALC: 36.3 % — SIGNIFICANT CHANGE UP (ref 34.5–45)
HGB BLD-MCNC: 11.8 G/DL — SIGNIFICANT CHANGE UP (ref 11.5–15.5)
IMM GRANULOCYTES NFR BLD AUTO: 0.6 % — SIGNIFICANT CHANGE UP (ref 0–1.5)
LYMPHOCYTES # BLD AUTO: 0.79 K/UL — LOW (ref 1–3.3)
LYMPHOCYTES # BLD AUTO: 6.5 % — LOW (ref 13–44)
MCHC RBC-ENTMCNC: 28.3 PG — SIGNIFICANT CHANGE UP (ref 27–34)
MCHC RBC-ENTMCNC: 32.5 GM/DL — SIGNIFICANT CHANGE UP (ref 32–36)
MCV RBC AUTO: 87.1 FL — SIGNIFICANT CHANGE UP (ref 80–100)
MONOCYTES # BLD AUTO: 0.43 K/UL — SIGNIFICANT CHANGE UP (ref 0–0.9)
MONOCYTES NFR BLD AUTO: 3.5 % — SIGNIFICANT CHANGE UP (ref 2–14)
NEUTROPHILS # BLD AUTO: 10.87 K/UL — HIGH (ref 1.8–7.4)
NEUTROPHILS NFR BLD AUTO: 89.2 % — HIGH (ref 43–77)
NT-PROBNP SERPL-SCNC: 499 PG/ML — HIGH (ref 0–125)
PLATELET # BLD AUTO: 359 K/UL — SIGNIFICANT CHANGE UP (ref 150–400)
POTASSIUM SERPL-MCNC: 4 MMOL/L — SIGNIFICANT CHANGE UP (ref 3.5–5.3)
POTASSIUM SERPL-SCNC: 4 MMOL/L — SIGNIFICANT CHANGE UP (ref 3.5–5.3)
PROCALCITONIN SERPL-MCNC: 0.66 NG/ML — HIGH (ref 0.02–0.1)
PROT SERPL-MCNC: 7.2 GM/DL — SIGNIFICANT CHANGE UP (ref 6–8.3)
RBC # BLD: 4.17 M/UL — SIGNIFICANT CHANGE UP (ref 3.8–5.2)
RBC # FLD: 13.3 % — SIGNIFICANT CHANGE UP (ref 10.3–14.5)
SODIUM SERPL-SCNC: 132 MMOL/L — LOW (ref 135–145)
T4 FREE SERPL-MCNC: 1.61 NG/DL — HIGH (ref 0.76–1.46)
TRANSFERRIN SERPL-MCNC: 145 MG/DL — LOW (ref 200–360)
TROPONIN I SERPL-MCNC: <0.015 NG/ML — SIGNIFICANT CHANGE UP (ref 0.01–0.04)
TSH SERPL-MCNC: 0.36 UU/ML — SIGNIFICANT CHANGE UP (ref 0.34–4.82)
WBC # BLD: 12.18 K/UL — HIGH (ref 3.8–10.5)
WBC # FLD AUTO: 12.18 K/UL — HIGH (ref 3.8–10.5)

## 2020-04-19 PROCEDURE — 71045 X-RAY EXAM CHEST 1 VIEW: CPT | Mod: 26

## 2020-04-19 PROCEDURE — 99232 SBSQ HOSP IP/OBS MODERATE 35: CPT

## 2020-04-19 RX ORDER — ASCORBIC ACID 60 MG
1500 TABLET,CHEWABLE ORAL EVERY 12 HOURS
Refills: 0 | Status: COMPLETED | OUTPATIENT
Start: 2020-04-19 | End: 2020-04-22

## 2020-04-19 RX ORDER — FUROSEMIDE 40 MG
20 TABLET ORAL ONCE
Refills: 0 | Status: COMPLETED | OUTPATIENT
Start: 2020-04-19 | End: 2020-04-19

## 2020-04-19 RX ADMIN — Medication 1 TABLET(S): at 12:12

## 2020-04-19 RX ADMIN — Medication 650 MILLIGRAM(S): at 17:32

## 2020-04-19 RX ADMIN — Medication 80 MILLIGRAM(S): at 23:27

## 2020-04-19 RX ADMIN — Medication 2 SPRAY(S): at 02:00

## 2020-04-19 RX ADMIN — Medication 103 MILLIGRAM(S): at 23:26

## 2020-04-19 RX ADMIN — Medication 650 MILLIGRAM(S): at 06:02

## 2020-04-19 RX ADMIN — Medication 2 SPRAY(S): at 17:51

## 2020-04-19 RX ADMIN — Medication 2 SPRAY(S): at 11:02

## 2020-04-19 RX ADMIN — Medication 1 TABLET(S): at 17:50

## 2020-04-19 RX ADMIN — ENOXAPARIN SODIUM 40 MILLIGRAM(S): 100 INJECTION SUBCUTANEOUS at 11:52

## 2020-04-19 RX ADMIN — Medication 2 SPRAY(S): at 17:59

## 2020-04-19 RX ADMIN — Medication 600 MILLIGRAM(S): at 06:12

## 2020-04-19 RX ADMIN — Medication 112 MICROGRAM(S): at 06:12

## 2020-04-19 RX ADMIN — Medication 100 MILLIGRAM(S): at 17:50

## 2020-04-19 RX ADMIN — Medication 20 MILLIGRAM(S): at 11:52

## 2020-04-19 RX ADMIN — CEFTRIAXONE 1000 MILLIGRAM(S): 500 INJECTION, POWDER, FOR SOLUTION INTRAMUSCULAR; INTRAVENOUS at 11:49

## 2020-04-19 RX ADMIN — Medication 2 SPRAY(S): at 16:41

## 2020-04-19 RX ADMIN — Medication 40 MILLIGRAM(S): at 06:12

## 2020-04-19 RX ADMIN — Medication 100 MILLIGRAM(S): at 06:12

## 2020-04-19 RX ADMIN — ALBUTEROL 2 PUFF(S): 90 AEROSOL, METERED ORAL at 02:00

## 2020-04-19 RX ADMIN — Medication 2 SPRAY(S): at 11:03

## 2020-04-19 RX ADMIN — Medication 40 MILLIGRAM(S): at 17:51

## 2020-04-19 RX ADMIN — Medication 2 SPRAY(S): at 06:11

## 2020-04-19 RX ADMIN — Medication 400 MILLIGRAM(S): at 06:12

## 2020-04-19 RX ADMIN — PANTOPRAZOLE SODIUM 40 MILLIGRAM(S): 20 TABLET, DELAYED RELEASE ORAL at 06:13

## 2020-04-19 RX ADMIN — Medication 103 MILLIGRAM(S): at 11:52

## 2020-04-19 RX ADMIN — Medication 2 SPRAY(S): at 04:03

## 2020-04-19 RX ADMIN — Medication 600 MILLIGRAM(S): at 17:50

## 2020-04-19 RX ADMIN — Medication 1 TABLET(S): at 11:02

## 2020-04-19 RX ADMIN — Medication 2 SPRAY(S): at 00:00

## 2020-04-19 NOTE — PROGRESS NOTE ADULT - ASSESSMENT
61 y/o Female with h/o thyroid CA s/p thyroidectomy  admitted on 4/16/20  with cough, fever, shortness of breath and chest pain progressively worsening since 4/7/2020.  Patient also c/o nausea and midepigastric discomfort. Patient denies leg pain/swelling. Patient has a pulse oximeter at home and noted O2 sats in the upper 80s over the last 24 hours. Patient was having persistent fever for the last 8-9 days at home. But her breathing got much worse in the last 24 hours, which prompted her ER Visit today. No recent travel. Her  is also sick at home.     A/P:  * Acute Hypoxic Respiratory Failure due to COVID-19 viral pneumonia with suspected superimposed CAP   - isolation precautions,  - O2 supplementation on NRB 96 % , down -titrate  if able to 6-8 L   - baseline procalcitonin 6--> 0.6 ,  CRP 21--> 6  , ferritin 1000,  , d-dimers 165 , trend every 48-72 h   - hydroxychloroquine as per protocol 5 days course ( risks and side effects discussed with patient )   - EKG reviewed on admission and QTc < 500 msec. No need for further cardiac monitoring.  - start /continue statin - pravastatin 40 hs  , monitor LFTs   - IV steroids 40 bid with ppi  day #2   - IV ceftriaxone and doxycycline , trial of IV vit C 1.5 gm bid  x 3 days   - 4/19 - lasix 20 x dose  - antipyretics, mucolytics, beta-agonist inhalers, incentive spirometry,  nasal saline irrigation, supportive care.    - DVT proph - Lovenox 40 mg SQ daily   -  ID consult consult - for need of  initiation of anakinra/tocilizumab/salimumab   - check O2 sat on 6 L   * Hypothyroidism  -Levothyroxine  112, free T4 slightly elevated , o/p monitoring   * Hyponatremia hypovolemic  - encourage po liquids, add supplements     Lovenox for DVT ppx.   Advanced directives - Goals of Care discussion had with patient : FULL CODE, 17 minutes spend

## 2020-04-19 NOTE — PROGRESS NOTE ADULT - SUBJECTIVE AND OBJECTIVE BOX
CC: dyspnea, dry cough     HPI/Subjective:     61 y/o Female with h/o thyroid CA s/p thyroidectomy  admitted on 4/16/20  with cough, fever, shortness of breath and chest pain progressively worsening since 4/7/2020.  Patient also c/o nausea and midepigastric discomfort. Patient denies leg pain/swelling. Patient has a pulse oximeter at home and noted O2 sats in the upper 80s over the last 24 hours. Patient was having persistent fever for the last 8-9 days at home. But her breathing got much worse in the last 24 hours, which prompted her ER Visit today. No recent travel. Her  is also sick at home.     4/17/20: Pt seen and examined bedside. Pt febrile this morning up to 100.7. Satting 91% on NRB. Pt was lying prone.   4/18 - patient seen and examined , + cough dry, + dyspnea , + lose bm , poor po intake, + dry throat and nose, denies cp, abdominal pain, POC discussed   4/19 - pt seen and examined, on NRB 98% , denies cp, + cough, + weakness, appetite slightly better, afebrile, POC discussed     REVIEW OF SYSTEMS: All other review of systems is negative unless indicated above.    T(C): 36.2 (04-19-20 @ 11:56), Max: 36.2 (04-19-20 @ 11:56)  T(F): 97.2 (04-19-20 @ 11:56), Max: 97.2 (04-19-20 @ 11:56)  HR: 86 (04-19-20 @ 11:56) (83 - 86)  BP: 127/74 (04-19-20 @ 11:56) (127/74 - 129/70)  RR: 20 (04-19-20 @ 11:56) (20 - 22)  SpO2: 98% (04-19-20 @ 11:56) (84% - 98%)  Wt(kg): --      PHYSICAL EXAM:    Constitutional: NAD, awake and alert, well-developed  HEENT: PERR, EOMI, Normal Hearing, MMM  Neck: Soft and supple  Respiratory: Breath sounds decreased at bases,  No wheezing, rales, + rhonchi  Cardiovascular: S1 and S2, regular rate and rhythm, no Murmurs, gallops or rubs  Gastrointestinal: Bowel Sounds present, soft, nontender, nondistended, no guarding, no rebound  Extremities: No peripheral edema  Neurological: A/O x 3, no focal deficits in my limited exam    04-19    132<L>  |  96  |  14  ----------------------------<  182<H>  4.0   |  26  |  0.89    Ca    8.5      19 Apr 2020 10:01    TPro  7.2  /  Alb  2.2<L>  /  TBili  0.3  /  DBili  x   /  AST  37  /  ALT  40  /  AlkPhos  65  04-19                        11.8   12.18 )-----------( 359      ( 19 Apr 2020 10:01 )             36.3   CARDIAC MARKERS ( 19 Apr 2020 10:01 )  <0.015 ng/mL / x     / x     / x     / x      C-Reactive Protein, Serum (04.19.20 @ 10:01)    C-Reactive Protein, Serum: 6.67 mg/dL          LIVER FUNCTIONS - ( 19 Apr 2020 10:01 )  Alb: 2.2 g/dL / Pro: 7.2 gm/dL / ALK PHOS: 65 U/L / ALT: 40 U/L / AST: 37 U/L / GGT: x                   04-18    130<L>  |  93<L>  |  10  ----------------------------<  202<H>  3.7   |  30  |  0.94    Ca    8.7      18 Apr 2020 08:20    TPro  8.6<H>  /  Alb  2.4<L>  /  TBili  0.3  /  DBili  x   /  AST  52<H>  /  ALT  47  /  AlkPhos  82  04-18        LIVER FUNCTIONS - ( 18 Apr 2020 08:20 )  Alb: 2.4 g/dL / Pro: 8.6 gm/dL / ALK PHOS: 82 U/L / ALT: 47 U/L / AST: 52 U/L / GGT: x         Troponin I, Serum (04.16.20 @ 02:35)    Troponin I, Serum: <0.015: High Sensitivity Troponin and new reference  range effective 7/6/2016 ng/mL    < from: 12 Lead ECG (04.16.20 @ 02:42) >    Ventricular Rate 85 BPM    Atrial Rate 85 BPM    P-R Interval 124 ms    QRS Duration 78 ms    Q-T Interval 360 ms    QTC Calculation(Bezet) 428 ms    P Axis 54 degrees    R Axis 67 degrees    T Axis 2 degrees    Diagnosis Line *** Poor data quality, interpretation may be adversely affected  Normal sinus rhythm  Nonspecific ST and T wave abnormality  Abnormal ECG    < end of copied text >            Culture - Blood (04.16.20 @ 02:35)    Specimen Source: .Blood Blood-Peripheral    Culture Results:   No growth to date.    < from: Xray Chest 1 View-PORTABLE IMMEDIATE (04.16.20 @ 03:17) >  FINDINGS:  Diffuse patchy airspace opacities throughout both lungs. Hemidiaphragms are sharp; no evidence of a pleural effusion. Cardiac silhouette size is exaggerated by AP technique. Mediastinal contours are within normal limits. The regional osseous structures are unremarkable.    IMPRESSION:    Diffuse patchy airspace opacities throughout both lungs.    < end of copied text >  MEDICATIONS  (STANDING):  ALBUTerol    90 MICROgram(s) HFA Inhaler 2 Puff(s) Inhalation every 6 hours  ascorbic acid IVPB 1500 milliGRAM(s) IV Intermittent daily  cefTRIAXone Injectable. 1000 milliGRAM(s) IV Push every 24 hours  doxycycline hyclate Capsule 100 milliGRAM(s) Oral every 12 hours  enoxaparin Injectable 40 milliGRAM(s) SubCutaneous daily  guaiFENesin  milliGRAM(s) Oral every 12 hours  hydroxychloroquine   Oral   hydroxychloroquine 400 milliGRAM(s) Oral every 24 hours  lactobacillus acidophilus 1 Tablet(s) Oral three times a day with meals  levothyroxine 112 MICROGram(s) Oral daily  methylPREDNISolone sodium succinate Injectable 40 milliGRAM(s) IV Push every 12 hours  pantoprazole    Tablet 40 milliGRAM(s) Oral before breakfast  sodium chloride 0.65% Nasal 2 Spray(s) Both Nostrils every 2 hours    MEDICATIONS  (PRN):  acetaminophen   Tablet .. 650 milliGRAM(s) Oral every 4 hours PRN Temp greater or equal to 38C (100.4F), Mild Pain (1 - 3)  ALBUTerol    90 MICROgram(s) HFA Inhaler 2 Puff(s) Inhalation every 4 hours PRN Shortness of Breath and/or Wheezing  hydrocodone/homatropine Syrup 5 milliLiter(s) Oral every 6 hours PRN Cough  ondansetron Injectable 4 milliGRAM(s) IV Push every 6 hours PRN Nausea and/or Vomiting

## 2020-04-20 PROCEDURE — 99232 SBSQ HOSP IP/OBS MODERATE 35: CPT

## 2020-04-20 RX ORDER — FUROSEMIDE 40 MG
20 TABLET ORAL ONCE
Refills: 0 | Status: COMPLETED | OUTPATIENT
Start: 2020-04-20 | End: 2020-04-20

## 2020-04-20 RX ORDER — ANAKINRA 100MG/0.67
100 SYRINGE (ML) SUBCUTANEOUS EVERY 6 HOURS
Refills: 0 | Status: COMPLETED | OUTPATIENT
Start: 2020-04-20 | End: 2020-04-23

## 2020-04-20 RX ORDER — ANAKINRA 100MG/0.67
SYRINGE (ML) SUBCUTANEOUS
Refills: 0 | Status: DISCONTINUED | OUTPATIENT
Start: 2020-04-20 | End: 2020-04-23

## 2020-04-20 RX ORDER — ANAKINRA 100MG/0.67
100 SYRINGE (ML) SUBCUTANEOUS EVERY 12 HOURS
Refills: 0 | Status: DISCONTINUED | OUTPATIENT
Start: 2020-04-23 | End: 2020-04-23

## 2020-04-20 RX ADMIN — ALBUTEROL 2 PUFF(S): 90 AEROSOL, METERED ORAL at 20:42

## 2020-04-20 RX ADMIN — ENOXAPARIN SODIUM 40 MILLIGRAM(S): 100 INJECTION SUBCUTANEOUS at 11:47

## 2020-04-20 RX ADMIN — Medication 650 MILLIGRAM(S): at 21:39

## 2020-04-20 RX ADMIN — Medication 103 MILLIGRAM(S): at 19:30

## 2020-04-20 RX ADMIN — Medication 2 SPRAY(S): at 19:34

## 2020-04-20 RX ADMIN — Medication 2 SPRAY(S): at 19:35

## 2020-04-20 RX ADMIN — Medication 103 MILLIGRAM(S): at 06:30

## 2020-04-20 RX ADMIN — Medication 40 MILLIGRAM(S): at 19:32

## 2020-04-20 RX ADMIN — Medication 600 MILLIGRAM(S): at 19:31

## 2020-04-20 RX ADMIN — Medication 2 SPRAY(S): at 20:42

## 2020-04-20 RX ADMIN — Medication 80 MILLIGRAM(S): at 20:41

## 2020-04-20 RX ADMIN — Medication 100 MILLIGRAM(S): at 06:43

## 2020-04-20 RX ADMIN — Medication 100 MILLIGRAM(S): at 11:47

## 2020-04-20 RX ADMIN — CEFTRIAXONE 1000 MILLIGRAM(S): 500 INJECTION, POWDER, FOR SOLUTION INTRAMUSCULAR; INTRAVENOUS at 11:47

## 2020-04-20 RX ADMIN — Medication 112 MICROGRAM(S): at 06:44

## 2020-04-20 RX ADMIN — Medication 2 SPRAY(S): at 10:41

## 2020-04-20 RX ADMIN — Medication 2 SPRAY(S): at 11:48

## 2020-04-20 RX ADMIN — Medication 2 SPRAY(S): at 21:39

## 2020-04-20 RX ADMIN — Medication 40 MILLIGRAM(S): at 06:44

## 2020-04-20 RX ADMIN — Medication 400 MILLIGRAM(S): at 06:44

## 2020-04-20 RX ADMIN — Medication 20 MILLIGRAM(S): at 19:27

## 2020-04-20 RX ADMIN — Medication 600 MILLIGRAM(S): at 06:43

## 2020-04-20 RX ADMIN — PANTOPRAZOLE SODIUM 40 MILLIGRAM(S): 20 TABLET, DELAYED RELEASE ORAL at 06:44

## 2020-04-20 RX ADMIN — Medication 650 MILLIGRAM(S): at 06:45

## 2020-04-20 RX ADMIN — Medication 1 TABLET(S): at 19:32

## 2020-04-20 RX ADMIN — Medication 100 MILLIGRAM(S): at 19:31

## 2020-04-20 RX ADMIN — Medication 1 TABLET(S): at 07:56

## 2020-04-20 RX ADMIN — Medication 1 TABLET(S): at 11:48

## 2020-04-20 RX ADMIN — Medication 100 MILLIGRAM(S): at 19:27

## 2020-04-20 RX ADMIN — Medication 2 SPRAY(S): at 16:47

## 2020-04-20 RX ADMIN — Medication 2 SPRAY(S): at 14:39

## 2020-04-20 RX ADMIN — Medication 2 SPRAY(S): at 06:45

## 2020-04-20 NOTE — PROGRESS NOTE ADULT - ASSESSMENT
61 y/o Female with h/o thyroid CA s/p thyroidectomy admitted on 4/16 for evaluation of shortness of breath, fever and cough associated with worsening midepigastric discomfort over the last week; has had fever and chills; no sick contacts and no recent travel; over last 24 hours worsened and came to Ed.    1. Patient admitted with viral syndrome secondary to COVID-19 infection and secondary superimposed pneumonia  - follow up cultures   - serial cbc and monitor temperature   - oxygen and nebs as needed   - reviewed prior medical records to evaluate for resistant or atypical pathogens   - continue isolation   - started on hydroxychloroquine on 4/16 and to continue until 4/20  - day #4 doxycycline and ceftriaxone  - tolerating antibiotics without rashes or side effects   - started on steroids  - given clinical scenario and markers of inflammation will start anakinra 100 mg SQ q 6 hours followed by taper  - follow markers of inflammation  2. other issues; per medicine 59 y/o Female with h/o thyroid CA s/p thyroidectomy admitted on 4/16 for evaluation of shortness of breath, fever and cough associated with worsening midepigastric discomfort over the last week; has had fever and chills; no sick contacts and no recent travel; over last 24 hours worsened and came to Ed.    1. Patient admitted with viral syndrome secondary to COVID-19 infection and secondary superimposed pneumonia  - follow up cultures   - serial cbc and monitor temperature   - oxygen and nebs as needed   - reviewed prior medical records to evaluate for resistant or atypical pathogens   - continue isolation   - started on hydroxychloroquine on 4/16 and to continue until 4/20  - day #4 doxycycline and ceftriaxone  - tolerating antibiotics without rashes or side effects   - started on steroids  - given clinical scenario and markers of inflammation will start anakinra 100 mg SQ q 6 hours for 3 days followed by taper  - follow markers of inflammation  2. other issues; per medicine

## 2020-04-20 NOTE — PROGRESS NOTE ADULT - SUBJECTIVE AND OBJECTIVE BOX
Date of service: 04-20-20 @ 10:56    Patient lying in bed; still very short of breath, anxious; on 100% NRB        ROS: no fever or chills; denies dizziness, no HA, no abdominal pain, no diarrhea or constipation; no dysuria, no urinary frequency, no legs pain, no rashes    MEDICATIONS  (STANDING):  ALBUTerol    90 MICROgram(s) HFA Inhaler 2 Puff(s) Inhalation every 6 hours  anakinra Injectable   SubCutaneous   ascorbic acid IVPB 1500 milliGRAM(s) IV Intermittent every 12 hours  cefTRIAXone Injectable. 1000 milliGRAM(s) IV Push every 24 hours  doxycycline hyclate Capsule 100 milliGRAM(s) Oral every 12 hours  enoxaparin Injectable 40 milliGRAM(s) SubCutaneous daily  guaiFENesin  milliGRAM(s) Oral every 12 hours  lactobacillus acidophilus 1 Tablet(s) Oral three times a day with meals  levothyroxine 112 MICROGram(s) Oral daily  methylPREDNISolone sodium succinate Injectable 40 milliGRAM(s) IV Push every 12 hours  pantoprazole    Tablet 40 milliGRAM(s) Oral before breakfast  pravastatin 80 milliGRAM(s) Oral at bedtime  sodium chloride 0.65% Nasal 2 Spray(s) Both Nostrils every 2 hours    MEDICATIONS  (PRN):  acetaminophen   Tablet .. 650 milliGRAM(s) Oral every 4 hours PRN Temp greater or equal to 38C (100.4F), Mild Pain (1 - 3)  ALBUTerol    90 MICROgram(s) HFA Inhaler 2 Puff(s) Inhalation every 4 hours PRN Shortness of Breath and/or Wheezing  hydrocodone/homatropine Syrup 5 milliLiter(s) Oral every 6 hours PRN Cough  ondansetron Injectable 4 milliGRAM(s) IV Push every 6 hours PRN Nausea and/or Vomiting      Vital Signs Last 24 Hrs  T(C): 35.7 (20 Apr 2020 05:48), Max: 36.7 (19 Apr 2020 23:24)  T(F): 96.3 (20 Apr 2020 05:48), Max: 98 (19 Apr 2020 23:24)  HR: 90 (20 Apr 2020 05:48) (85 - 90)  BP: 144/63 (20 Apr 2020 05:48) (106/70 - 144/63)  BP(mean): --  RR: 20 (20 Apr 2020 05:48) (20 - 24)  SpO2: 92% (20 Apr 2020 05:48) (76% - 98%)        Physical Exam:            Constitutional: frail looking  HEENT: NC/AT, EOMI, PERRLA, conjunctivae clear; ears and nose atraumatic; pharynx clear  Neck: supple; thyroid not palpable  Back: no tenderness  Respiratory: respiratory effort normal; clear to auscultation  Cardiovascular: S1S2 regular, no murmurs  Abdomen: soft, not tender, not distended, positive BS; no liver or spleen organomegaly  Genitourinary: no suprapubic tenderness  Musculoskeletal: no muscle tenderness, no joint swelling or tenderness  Neurological/ Psychiatric: AxOx3, judgement and insight normal;  moving all extremities  Skin: no rashes; no palpable lesions    Labs: all available labs reviewed      Labs:                        11.8   12.18 )-----------( 359      ( 19 Apr 2020 10:01 )             36.3     04-19    132<L>  |  96  |  14  ----------------------------<  182<H>  4.0   |  26  |  0.89    Ca    8.5      19 Apr 2020 10:01    TPro  7.2  /  Alb  2.2<L>  /  TBili  0.3  /  DBili  x   /  AST  37  /  ALT  40  /  AlkPhos  65  04-19           Cultures:       Culture - Blood (collected 04-16-20 @ 02:35)  Source: .Blood Blood-Peripheral  Preliminary Report (04-17-20 @ 09:02):    No growth to date.      Ferritin, Serum: 615 ng/mL (04-19-20 @ 16:00)  C-Reactive Protein, Serum: 6.67 mg/dL (04-19-20 @ 10:01)  D-Dimer Assay, Quantitative: 156 ng/mL DDU (04-19-20 @ 10:01)  C-Reactive Protein, Serum: 21.49 mg/dL (04-16-20 @ 02:35)  D-Dimer Assay, Quantitative: 165 ng/mL DDU (04-16-20 @ 02:35)  Ferritin, Serum: 1090 ng/mL (04-16-20 @ 02:35)          COVID-19 PCR . (04.16.20 @ 02:35)    COVID-19 PCR: Detected: Test Name:COVID-19  Called by:salo  Called to:Leigh Ann Mejia  Read back 2 Pt IDs:y Read back values:y Date/Tm:04/16/20 04:11  This test has been validated by [employer] to be accurate;  though it has not been FDA cleared/approved by the usual pathway.  As  with all laboratory tests, results should be correlated with clinical  findings.  https://www.fda.gov/media/794109/download  https://www.fda.gov/media/794739/download      < from: Xray Chest 1 View-PORTABLE IMMEDIATE (04.16.20 @ 03:17) >    EXAM:  XR CHEST PORTABLE IMMED 1V                            PROCEDURE DATE:  04/16/2020          INTERPRETATION:  CLINICAL INFORMATION:  Shortness of Breath, Cough,  Fever    TECHNIQUE:  AP view of the chest.    COMPARISON:  None available.    FINDINGS:  Diffuse patchy airspace opacities throughout both lungs. Hemidiaphragms are sharp; no evidence of a pleural effusion. Cardiac silhouette size is exaggerated by AP technique. Mediastinal contours are within normal limits. The regional osseous structures are unremarkable.    IMPRESSION:    Diffuse patchy airspace opacities throughout both lungs.    < end of copied text >      Radiology: all available radiological tests reviewed    Advanced directives addressed: full resuscitation

## 2020-04-20 NOTE — PROGRESS NOTE ADULT - ASSESSMENT
61 y/o Female with h/o thyroid CA s/p thyroidectomy  admitted on 4/16/20  with cough, fever, shortness of breath and chest pain progressively worsening since 4/7/2020.  Patient also c/o nausea and midepigastric discomfort. Patient denies leg pain/swelling. Patient has a pulse oximeter at home and noted O2 sats in the upper 80s over the last 24 hours. Patient was having persistent fever for the last 8-9 days at home. But her breathing got much worse in the last 24 hours, which prompted her ER Visit today. No recent travel. Her  is also sick at home.     A/P:  * Acute Hypoxic Respiratory Failure due to COVID-19 viral pneumonia with suspected superimposed CAP   - isolation precautions,  - O2 supplementation on NRB 96 % , down -titrate  if able to 6-8 L   - baseline procalcitonin 6--> 0.6 ,  CRP 21--> 6  , ferritin 1000,  , d-dimers 165 , trend every 48-72 h   - hydroxychloroquine as per protocol 5 days course ( risks and side effects discussed with patient )   - EKG reviewed on admission and QTc < 500 msec. No need for further cardiac monitoring.  - start /continue statin - pravastatin 40 hs  , monitor LFTs   - IV steroids 40 bid with ppi  day #2   - IV ceftriaxone and doxycycline , trial of IV vit C 1.5 gm bid  x 3 days   - 4/19 - lasix 20 x 1 dose, 4/20 - lasix 20 x 1 dose   - antipyretics, mucolytics, beta-agonist inhalers, incentive spirometry,  nasal saline irrigation, supportive care.    - DVT proph - Lovenox 40 mg SQ daily   -  ID consult consult - for need of  initiation of anakinra/tocilizumab/salimumab   - check O2 sat on 6 L - < 88 %   - 4/20 start anakinra as per protocol, check Il-1, Il-6 baseline markers   * Hypothyroidism  -Levothyroxine  112, free T4 slightly elevated , o/p monitoring   * Hyponatremia hypovolemic  - encourage po liquids, add supplements     Lovenox for DVT ppx.   Advanced directives - Goals of Care discussion had with patient : FULL CODE, 17 minutes spend

## 2020-04-20 NOTE — PROGRESS NOTE ADULT - SUBJECTIVE AND OBJECTIVE BOX
CC: dyspnea, dry cough     HPI/Subjective:     61 y/o Female with h/o thyroid CA s/p thyroidectomy  admitted on 4/16/20  with cough, fever, shortness of breath and chest pain progressively worsening since 4/7/2020.  Patient also c/o nausea and midepigastric discomfort. Patient denies leg pain/swelling. Patient has a pulse oximeter at home and noted O2 sats in the upper 80s over the last 24 hours. Patient was having persistent fever for the last 8-9 days at home. But her breathing got much worse in the last 24 hours, which prompted her ER Visit today. No recent travel. Her  is also sick at home.     4/17/20: Pt seen and examined bedside. Pt febrile this morning up to 100.7. Satting 91% on NRB. Pt was lying prone.   4/18 - patient seen and examined , + cough dry, + dyspnea , + lose bm , poor po intake, + dry throat and nose, denies cp, abdominal pain, POC discussed   4/19 - pt seen and examined, on NRB 98% , denies cp, + cough, + weakness, appetite slightly better, afebrile, POC discussed   4/20 - pt seen and examined, on NRB, does not feel better, + cough, + dyspnea, + weakness, denies cp, abdominal pain, POC discussed     REVIEW OF SYSTEMS: All other review of systems is negative unless indicated above.    T(C): 35.7 (04-20-20 @ 05:48), Max: 36.7 (04-19-20 @ 23:24)  T(F): 96.3 (04-20-20 @ 05:48), Max: 98 (04-19-20 @ 23:24)  HR: 83 (04-20-20 @ 11:28) (83 - 90)  BP: 129/77 (04-20-20 @ 11:28) (106/70 - 144/63)  RR: 20 (04-20-20 @ 05:48) (20 - 24)  SpO2: 98% (04-20-20 @ 11:28) (76% - 98%)  Wt(kg): --      PHYSICAL EXAM:    Constitutional: NAD, awake and alert, well-developed  HEENT: PERR, EOMI, Normal Hearing, MMM  Neck: Soft and supple  Respiratory: Breath sounds decreased at bases,  No wheezing, rales, + rhonchi  Cardiovascular: S1 and S2, regular rate and rhythm, no Murmurs, gallops or rubs  Gastrointestinal: Bowel Sounds present, soft, nontender, nondistended, no guarding, no rebound  Extremities: No peripheral edema  Neurological: A/O x 3, no focal deficits in my limited exam    04-19    132<L>  |  96  |  14  ----------------------------<  182<H>  4.0   |  26  |  0.89    Ca    8.5      19 Apr 2020 10:01    TPro  7.2  /  Alb  2.2<L>  /  TBili  0.3  /  DBili  x   /  AST  37  /  ALT  40  /  AlkPhos  65  04-19                        11.8   12.18 )-----------( 359      ( 19 Apr 2020 10:01 )             36.3   CARDIAC MARKERS ( 19 Apr 2020 10:01 )  <0.015 ng/mL / x     / x     / x     / x      C-Reactive Protein, Serum (04.19.20 @ 10:01)    C-Reactive Protein, Serum: 6.67 mg/dL          LIVER FUNCTIONS - ( 19 Apr 2020 10:01 )  Alb: 2.2 g/dL / Pro: 7.2 gm/dL / ALK PHOS: 65 U/L / ALT: 40 U/L / AST: 37 U/L / GGT: x                   04-18    130<L>  |  93<L>  |  10  ----------------------------<  202<H>  3.7   |  30  |  0.94    Ca    8.7      18 Apr 2020 08:20    TPro  8.6<H>  /  Alb  2.4<L>  /  TBili  0.3  /  DBili  x   /  AST  52<H>  /  ALT  47  /  AlkPhos  82  04-18        LIVER FUNCTIONS - ( 18 Apr 2020 08:20 )  Alb: 2.4 g/dL / Pro: 8.6 gm/dL / ALK PHOS: 82 U/L / ALT: 47 U/L / AST: 52 U/L / GGT: x         Troponin I, Serum (04.16.20 @ 02:35)    Troponin I, Serum: <0.015: High Sensitivity Troponin and new reference  range effective 7/6/2016 ng/mL    < from: 12 Lead ECG (04.16.20 @ 02:42) >    Ventricular Rate 85 BPM    Atrial Rate 85 BPM    P-R Interval 124 ms    QRS Duration 78 ms    Q-T Interval 360 ms    QTC Calculation(Bezet) 428 ms    P Axis 54 degrees    R Axis 67 degrees    T Axis 2 degrees    Diagnosis Line *** Poor data quality, interpretation may be adversely affected  Normal sinus rhythm  Nonspecific ST and T wave abnormality  Abnormal ECG    < end of copied text >            Culture - Blood (04.16.20 @ 02:35)    Specimen Source: .Blood Blood-Peripheral    Culture Results:   No growth to date.    < from: Xray Chest 1 View-PORTABLE IMMEDIATE (04.16.20 @ 03:17) >  FINDINGS:  Diffuse patchy airspace opacities throughout both lungs. Hemidiaphragms are sharp; no evidence of a pleural effusion. Cardiac silhouette size is exaggerated by AP technique. Mediastinal contours are within normal limits. The regional osseous structures are unremarkable.    IMPRESSION:    Diffuse patchy airspace opacities throughout both lungs.    < end of copied text >  MEDICATIONS  (STANDING):  ALBUTerol    90 MICROgram(s) HFA Inhaler 2 Puff(s) Inhalation every 6 hours  ascorbic acid IVPB 1500 milliGRAM(s) IV Intermittent daily  cefTRIAXone Injectable. 1000 milliGRAM(s) IV Push every 24 hours  doxycycline hyclate Capsule 100 milliGRAM(s) Oral every 12 hours  enoxaparin Injectable 40 milliGRAM(s) SubCutaneous daily  guaiFENesin  milliGRAM(s) Oral every 12 hours  hydroxychloroquine   Oral   hydroxychloroquine 400 milliGRAM(s) Oral every 24 hours  lactobacillus acidophilus 1 Tablet(s) Oral three times a day with meals  levothyroxine 112 MICROGram(s) Oral daily  methylPREDNISolone sodium succinate Injectable 40 milliGRAM(s) IV Push every 12 hours  pantoprazole    Tablet 40 milliGRAM(s) Oral before breakfast  sodium chloride 0.65% Nasal 2 Spray(s) Both Nostrils every 2 hours    MEDICATIONS  (PRN):  acetaminophen   Tablet .. 650 milliGRAM(s) Oral every 4 hours PRN Temp greater or equal to 38C (100.4F), Mild Pain (1 - 3)  ALBUTerol    90 MICROgram(s) HFA Inhaler 2 Puff(s) Inhalation every 4 hours PRN Shortness of Breath and/or Wheezing  hydrocodone/homatropine Syrup 5 milliLiter(s) Oral every 6 hours PRN Cough  ondansetron Injectable 4 milliGRAM(s) IV Push every 6 hours PRN Nausea and/or Vomiting

## 2020-04-21 LAB
ALBUMIN SERPL ELPH-MCNC: 2.5 G/DL — LOW (ref 3.3–5)
ALP SERPL-CCNC: 52 U/L — SIGNIFICANT CHANGE UP (ref 40–120)
ALT FLD-CCNC: 33 U/L — SIGNIFICANT CHANGE UP (ref 12–78)
ANION GAP SERPL CALC-SCNC: 4 MMOL/L — LOW (ref 5–17)
AST SERPL-CCNC: 18 U/L — SIGNIFICANT CHANGE UP (ref 15–37)
BASOPHILS # BLD AUTO: 0 K/UL — SIGNIFICANT CHANGE UP (ref 0–0.2)
BASOPHILS NFR BLD AUTO: 0 % — SIGNIFICANT CHANGE UP (ref 0–2)
BILIRUB SERPL-MCNC: 0.4 MG/DL — SIGNIFICANT CHANGE UP (ref 0.2–1.2)
BUN SERPL-MCNC: 23 MG/DL — SIGNIFICANT CHANGE UP (ref 7–23)
CALCIUM SERPL-MCNC: 8.9 MG/DL — SIGNIFICANT CHANGE UP (ref 8.5–10.1)
CHLORIDE SERPL-SCNC: 100 MMOL/L — SIGNIFICANT CHANGE UP (ref 96–108)
CO2 SERPL-SCNC: 33 MMOL/L — HIGH (ref 22–31)
CREAT SERPL-MCNC: 0.78 MG/DL — SIGNIFICANT CHANGE UP (ref 0.5–1.3)
CRP SERPL-MCNC: 1.67 MG/DL — HIGH (ref 0–0.4)
CULTURE RESULTS: SIGNIFICANT CHANGE UP
D DIMER BLD IA.RAPID-MCNC: <150 NG/ML DDU — SIGNIFICANT CHANGE UP
EOSINOPHIL # BLD AUTO: 0 K/UL — SIGNIFICANT CHANGE UP (ref 0–0.5)
EOSINOPHIL NFR BLD AUTO: 0 % — SIGNIFICANT CHANGE UP (ref 0–6)
GLUCOSE SERPL-MCNC: 171 MG/DL — HIGH (ref 70–99)
HCT VFR BLD CALC: 36.4 % — SIGNIFICANT CHANGE UP (ref 34.5–45)
HGB BLD-MCNC: 11.8 G/DL — SIGNIFICANT CHANGE UP (ref 11.5–15.5)
IMM GRANULOCYTES NFR BLD AUTO: 1.3 % — SIGNIFICANT CHANGE UP (ref 0–1.5)
LYMPHOCYTES # BLD AUTO: 0.69 K/UL — LOW (ref 1–3.3)
LYMPHOCYTES # BLD AUTO: 8.9 % — LOW (ref 13–44)
MCHC RBC-ENTMCNC: 28.8 PG — SIGNIFICANT CHANGE UP (ref 27–34)
MCHC RBC-ENTMCNC: 32.4 GM/DL — SIGNIFICANT CHANGE UP (ref 32–36)
MCV RBC AUTO: 88.8 FL — SIGNIFICANT CHANGE UP (ref 80–100)
MONOCYTES # BLD AUTO: 0.49 K/UL — SIGNIFICANT CHANGE UP (ref 0–0.9)
MONOCYTES NFR BLD AUTO: 6.4 % — SIGNIFICANT CHANGE UP (ref 2–14)
NEUTROPHILS # BLD AUTO: 6.43 K/UL — SIGNIFICANT CHANGE UP (ref 1.8–7.4)
NEUTROPHILS NFR BLD AUTO: 83.4 % — HIGH (ref 43–77)
PLATELET # BLD AUTO: 373 K/UL — SIGNIFICANT CHANGE UP (ref 150–400)
POTASSIUM SERPL-MCNC: 4.6 MMOL/L — SIGNIFICANT CHANGE UP (ref 3.5–5.3)
POTASSIUM SERPL-SCNC: 4.6 MMOL/L — SIGNIFICANT CHANGE UP (ref 3.5–5.3)
PROCALCITONIN SERPL-MCNC: 0.23 NG/ML — HIGH (ref 0.02–0.1)
PROT SERPL-MCNC: 7.1 GM/DL — SIGNIFICANT CHANGE UP (ref 6–8.3)
RBC # BLD: 4.1 M/UL — SIGNIFICANT CHANGE UP (ref 3.8–5.2)
RBC # FLD: 13.3 % — SIGNIFICANT CHANGE UP (ref 10.3–14.5)
SODIUM SERPL-SCNC: 137 MMOL/L — SIGNIFICANT CHANGE UP (ref 135–145)
SPECIMEN SOURCE: SIGNIFICANT CHANGE UP
WBC # BLD: 7.71 K/UL — SIGNIFICANT CHANGE UP (ref 3.8–10.5)
WBC # FLD AUTO: 7.71 K/UL — SIGNIFICANT CHANGE UP (ref 3.8–10.5)

## 2020-04-21 PROCEDURE — 99232 SBSQ HOSP IP/OBS MODERATE 35: CPT

## 2020-04-21 RX ORDER — POLYETHYLENE GLYCOL 3350 17 G/17G
17 POWDER, FOR SOLUTION ORAL DAILY
Refills: 0 | Status: DISCONTINUED | OUTPATIENT
Start: 2020-04-21 | End: 2020-04-25

## 2020-04-21 RX ADMIN — Medication 2 SPRAY(S): at 18:20

## 2020-04-21 RX ADMIN — Medication 103 MILLIGRAM(S): at 05:59

## 2020-04-21 RX ADMIN — Medication 1 TABLET(S): at 18:15

## 2020-04-21 RX ADMIN — Medication 2 SPRAY(S): at 15:00

## 2020-04-21 RX ADMIN — Medication 600 MILLIGRAM(S): at 18:15

## 2020-04-21 RX ADMIN — Medication 100 MILLIGRAM(S): at 22:14

## 2020-04-21 RX ADMIN — Medication 2 SPRAY(S): at 04:01

## 2020-04-21 RX ADMIN — Medication 40 MILLIGRAM(S): at 06:00

## 2020-04-21 RX ADMIN — Medication 103 MILLIGRAM(S): at 18:14

## 2020-04-21 RX ADMIN — Medication 2 SPRAY(S): at 00:03

## 2020-04-21 RX ADMIN — Medication 40 MILLIGRAM(S): at 18:15

## 2020-04-21 RX ADMIN — Medication 2 SPRAY(S): at 11:48

## 2020-04-21 RX ADMIN — ALBUTEROL 2 PUFF(S): 90 AEROSOL, METERED ORAL at 09:00

## 2020-04-21 RX ADMIN — ENOXAPARIN SODIUM 40 MILLIGRAM(S): 100 INJECTION SUBCUTANEOUS at 11:32

## 2020-04-21 RX ADMIN — Medication 2 SPRAY(S): at 20:15

## 2020-04-21 RX ADMIN — Medication 80 MILLIGRAM(S): at 20:15

## 2020-04-21 RX ADMIN — Medication 2 SPRAY(S): at 08:36

## 2020-04-21 RX ADMIN — Medication 1 TABLET(S): at 08:36

## 2020-04-21 RX ADMIN — Medication 650 MILLIGRAM(S): at 06:05

## 2020-04-21 RX ADMIN — Medication 100 MILLIGRAM(S): at 01:10

## 2020-04-21 RX ADMIN — Medication 1 TABLET(S): at 11:32

## 2020-04-21 RX ADMIN — Medication 100 MILLIGRAM(S): at 05:59

## 2020-04-21 RX ADMIN — Medication 600 MILLIGRAM(S): at 06:00

## 2020-04-21 RX ADMIN — Medication 2 SPRAY(S): at 06:00

## 2020-04-21 RX ADMIN — Medication 100 MILLIGRAM(S): at 13:00

## 2020-04-21 RX ADMIN — Medication 100 MILLIGRAM(S): at 06:21

## 2020-04-21 RX ADMIN — Medication 2 SPRAY(S): at 18:26

## 2020-04-21 RX ADMIN — POLYETHYLENE GLYCOL 3350 17 GRAM(S): 17 POWDER, FOR SOLUTION ORAL at 18:20

## 2020-04-21 RX ADMIN — Medication 2 SPRAY(S): at 13:00

## 2020-04-21 RX ADMIN — PANTOPRAZOLE SODIUM 40 MILLIGRAM(S): 20 TABLET, DELAYED RELEASE ORAL at 06:00

## 2020-04-21 RX ADMIN — Medication 100 MILLIGRAM(S): at 18:14

## 2020-04-21 RX ADMIN — Medication 2 SPRAY(S): at 23:33

## 2020-04-21 RX ADMIN — Medication 2 SPRAY(S): at 22:14

## 2020-04-21 RX ADMIN — Medication 112 MICROGRAM(S): at 06:00

## 2020-04-21 RX ADMIN — CEFTRIAXONE 1000 MILLIGRAM(S): 500 INJECTION, POWDER, FOR SOLUTION INTRAMUSCULAR; INTRAVENOUS at 08:35

## 2020-04-21 NOTE — PROGRESS NOTE ADULT - SUBJECTIVE AND OBJECTIVE BOX
CC: dyspnea, dry cough     HPI/Subjective:     59 y/o Female with h/o thyroid CA s/p thyroidectomy  admitted on 4/16/20  with cough, fever, shortness of breath and chest pain progressively worsening since 4/7/2020.  Patient also c/o nausea and midepigastric discomfort. Patient denies leg pain/swelling. Patient has a pulse oximeter at home and noted O2 sats in the upper 80s over the last 24 hours. Patient was having persistent fever for the last 8-9 days at home. But her breathing got much worse in the last 24 hours, which prompted her ER Visit today. No recent travel. Her  is also sick at home.     4/17/20: Pt seen and examined bedside. Pt febrile this morning up to 100.7. Satting 91% on NRB. Pt was lying prone.   4/18 - patient seen and examined , + cough dry, + dyspnea , + lose bm , poor po intake, + dry throat and nose, denies cp, abdominal pain, POC discussed   4/19 - pt seen and examined, on NRB 98% , denies cp, + cough, + weakness, appetite slightly better, afebrile, POC discussed   4/20 - pt seen and examined, on NRB, does not feel better, + cough, + dyspnea, + weakness, denies cp, abdominal pain, POC discussed   4/21 - pt seen and examined, feels better, afebrile tolerates po intake, weakness improving, 95% on 8 L, 100 % on NRB , POC discussed     REVIEW OF SYSTEMS: All other review of systems is negative unless indicated above.    T(C): 36.1 (04-21-20 @ 08:30), Max: 36.4 (04-21-20 @ 05:26)  T(F): 97 (04-21-20 @ 08:30), Max: 97.5 (04-21-20 @ 05:26)  HR: 80 (04-21-20 @ 12:00) (76 - 80)  BP: 134/78 (04-21-20 @ 12:00) (110/65 - 134/78)  RR: 20 (04-21-20 @ 08:30) (19 - 20)  SpO2: 100% (04-21-20 @ 12:00) (98% - 100%)  Wt(kg): --      PHYSICAL EXAM:    Constitutional: NAD, awake and alert, well-developed  HEENT: PERR, EOMI, Normal Hearing, MMM  Neck: Soft and supple  Respiratory: Breath sounds decreased at bases,  No wheezing, rales, + rhonchi  Cardiovascular: S1 and S2, regular rate and rhythm, no Murmurs, gallops or rubs  Gastrointestinal: Bowel Sounds present, soft, nontender, nondistended, no guarding, no rebound  Extremities: No peripheral edema  Neurological: A/O x 3, no focal deficits in my limited exam    04-21    137  |  100  |  23  ----------------------------<  171<H>  4.6   |  33<H>  |  0.78    Ca    8.9      21 Apr 2020 08:47    TPro  7.1  /  Alb  2.5<L>  /  TBili  0.4  /  DBili  x   /  AST  18  /  ALT  33  /  AlkPhos  52  04-21                            11.8   7.71  )-----------( 373      ( 21 Apr 2020 08:47 )             36.4             LIVER FUNCTIONS - ( 21 Apr 2020 08:47 )  Alb: 2.5 g/dL / Pro: 7.1 gm/dL / ALK PHOS: 52 U/L / ALT: 33 U/L / AST: 18 U/L / GGT: x                   04-19    132<L>  |  96  |  14  ----------------------------<  182<H>  4.0   |  26  |  0.89    Ca    8.5      19 Apr 2020 10:01    TPro  7.2  /  Alb  2.2<L>  /  TBili  0.3  /  DBili  x   /  AST  37  /  ALT  40  /  AlkPhos  65  04-19                        11.8   12.18 )-----------( 359      ( 19 Apr 2020 10:01 )             36.3   CARDIAC MARKERS ( 19 Apr 2020 10:01 )  <0.015 ng/mL / x     / x     / x     / x      C-Reactive Protein, Serum (04.19.20 @ 10:01)    C-Reactive Protein, Serum: 6.67 mg/dL    C-Reactive Protein, Serum (04.21.20 @ 08:47)    C-Reactive Protein, Serum: 1.67 mg/dL          LIVER FUNCTIONS - ( 19 Apr 2020 10:01 )  Alb: 2.2 g/dL / Pro: 7.2 gm/dL / ALK PHOS: 65 U/L / ALT: 40 U/L / AST: 37 U/L / GGT: x                   04-18    130<L>  |  93<L>  |  10  ----------------------------<  202<H>  3.7   |  30  |  0.94    Ca    8.7      18 Apr 2020 08:20    TPro  8.6<H>  /  Alb  2.4<L>  /  TBili  0.3  /  DBili  x   /  AST  52<H>  /  ALT  47  /  AlkPhos  82  04-18        LIVER FUNCTIONS - ( 18 Apr 2020 08:20 )  Alb: 2.4 g/dL / Pro: 8.6 gm/dL / ALK PHOS: 82 U/L / ALT: 47 U/L / AST: 52 U/L / GGT: x         Troponin I, Serum (04.16.20 @ 02:35)    Troponin I, Serum: <0.015: High Sensitivity Troponin and new reference  range effective 7/6/2016 ng/mL    < from: 12 Lead ECG (04.16.20 @ 02:42) >    Ventricular Rate 85 BPM    Atrial Rate 85 BPM    P-R Interval 124 ms    QRS Duration 78 ms    Q-T Interval 360 ms    QTC Calculation(Bezet) 428 ms    P Axis 54 degrees    R Axis 67 degrees    T Axis 2 degrees    Diagnosis Line *** Poor data quality, interpretation may be adversely affected  Normal sinus rhythm  Nonspecific ST and T wave abnormality  Abnormal ECG    < end of copied text >  < from: Xray Chest 1 View- PORTABLE-Routine (04.19.20 @ 10:22) >    Comparison is made to prior study of 3 days prior    A single frontal view of the chest demonstratespredominantly peripheral alveolar opacities which are mild to moderate and grossly unchanged from prior study.    < end of copied text >            Culture - Blood (04.16.20 @ 02:35)    Specimen Source: .Blood Blood-Peripheral    Culture Results:   No growth to date.    < from: Xray Chest 1 View-PORTABLE IMMEDIATE (04.16.20 @ 03:17) >  FINDINGS:  Diffuse patchy airspace opacities throughout both lungs. Hemidiaphragms are sharp; no evidence of a pleural effusion. Cardiac silhouette size is exaggerated by AP technique. Mediastinal contours are within normal limits. The regional osseous structures are unremarkable.    IMPRESSION:    Diffuse patchy airspace opacities throughout both lungs.    < end of copied text >  MEDICATIONS  (STANDING):  ALBUTerol    90 MICROgram(s) HFA Inhaler 2 Puff(s) Inhalation every 6 hours  ascorbic acid IVPB 1500 milliGRAM(s) IV Intermittent daily  cefTRIAXone Injectable. 1000 milliGRAM(s) IV Push every 24 hours  doxycycline hyclate Capsule 100 milliGRAM(s) Oral every 12 hours  enoxaparin Injectable 40 milliGRAM(s) SubCutaneous daily  guaiFENesin  milliGRAM(s) Oral every 12 hours  hydroxychloroquine   Oral   hydroxychloroquine 400 milliGRAM(s) Oral every 24 hours  lactobacillus acidophilus 1 Tablet(s) Oral three times a day with meals  levothyroxine 112 MICROGram(s) Oral daily  methylPREDNISolone sodium succinate Injectable 40 milliGRAM(s) IV Push every 12 hours  pantoprazole    Tablet 40 milliGRAM(s) Oral before breakfast  sodium chloride 0.65% Nasal 2 Spray(s) Both Nostrils every 2 hours    MEDICATIONS  (PRN):  acetaminophen   Tablet .. 650 milliGRAM(s) Oral every 4 hours PRN Temp greater or equal to 38C (100.4F), Mild Pain (1 - 3)  ALBUTerol    90 MICROgram(s) HFA Inhaler 2 Puff(s) Inhalation every 4 hours PRN Shortness of Breath and/or Wheezing  hydrocodone/homatropine Syrup 5 milliLiter(s) Oral every 6 hours PRN Cough  ondansetron Injectable 4 milliGRAM(s) IV Push every 6 hours PRN Nausea and/or Vomiting

## 2020-04-21 NOTE — PROGRESS NOTE ADULT - ASSESSMENT
61 y/o Female with h/o thyroid CA s/p thyroidectomy  admitted on 4/16/20  with cough, fever, shortness of breath and chest pain progressively worsening since 4/7/2020.  Patient also c/o nausea and midepigastric discomfort. Patient denies leg pain/swelling. Patient has a pulse oximeter at home and noted O2 sats in the upper 80s over the last 24 hours. Patient was having persistent fever for the last 8-9 days at home. But her breathing got much worse in the last 24 hours, which prompted her ER Visit today. No recent travel. Her  is also sick at home.     A/P:  * Acute Hypoxic Respiratory Failure due to COVID-19 viral pneumonia with suspected superimposed CAP   - isolation precautions,  - O2 supplementation on NRB 96 --> 100% , down -titrate  if able to 95% on 8 L   - baseline procalcitonin 6--> 0.6 ,  CRP 21--> 6  , ferritin 1000,  , d-dimers 165 , trend every 48-72 h   - hydroxychloroquine as per protocol 5 days course ( risks and side effects discussed with patient )   - EKG reviewed on admission and QTc < 500 msec. No need for further cardiac monitoring.  - start /continue statin - pravastatin 40 hs  , monitor LFTs   - IV steroids 40 bid with ppi  day #2   - IV ceftriaxone and doxycycline , trial of IV vit C 1.5 gm bid  x 3 days   - 4/19 - lasix 20 x 1 dose, 4/20 - lasix 20 x 1 dose   - antipyretics, mucolytics, beta-agonist inhalers, incentive spirometry,  nasal saline irrigation, supportive care.    - DVT proph - Lovenox 40 mg SQ daily   -  ID consult consult - for need of  initiation of anakinra/tocilizumab/salimumab   - check O2 sat on 6 L - < 88 %   - 4/20 start anakinra as per protocol, check Il-1, Il-6 baseline markers   - CXR 4/19 - no change   * Hypothyroidism  -Levothyroxine  112, free T4 slightly elevated , o/p monitoring   * Hyponatremia hypovolemic  - encourage po liquids, add supplements     Lovenox for DVT ppx.   Advanced directives - Goals of Care discussion had with patient : FULL CODE, 17 minutes spend     Dispo - c/w anakinra, supportive care

## 2020-04-22 PROCEDURE — 99232 SBSQ HOSP IP/OBS MODERATE 35: CPT

## 2020-04-22 RX ADMIN — Medication 100 MILLIGRAM(S): at 05:19

## 2020-04-22 RX ADMIN — Medication 2 SPRAY(S): at 10:46

## 2020-04-22 RX ADMIN — Medication 2 SPRAY(S): at 05:18

## 2020-04-22 RX ADMIN — Medication 112 MICROGRAM(S): at 05:19

## 2020-04-22 RX ADMIN — PANTOPRAZOLE SODIUM 40 MILLIGRAM(S): 20 TABLET, DELAYED RELEASE ORAL at 05:19

## 2020-04-22 RX ADMIN — Medication 650 MILLIGRAM(S): at 18:00

## 2020-04-22 RX ADMIN — ALBUTEROL 2 PUFF(S): 90 AEROSOL, METERED ORAL at 08:30

## 2020-04-22 RX ADMIN — Medication 1 TABLET(S): at 11:52

## 2020-04-22 RX ADMIN — Medication 1 TABLET(S): at 08:18

## 2020-04-22 RX ADMIN — Medication 650 MILLIGRAM(S): at 11:53

## 2020-04-22 RX ADMIN — POLYETHYLENE GLYCOL 3350 17 GRAM(S): 17 POWDER, FOR SOLUTION ORAL at 11:52

## 2020-04-22 RX ADMIN — Medication 2 SPRAY(S): at 14:00

## 2020-04-22 RX ADMIN — Medication 600 MILLIGRAM(S): at 05:19

## 2020-04-22 RX ADMIN — ALBUTEROL 2 PUFF(S): 90 AEROSOL, METERED ORAL at 14:00

## 2020-04-22 RX ADMIN — Medication 1 TABLET(S): at 16:47

## 2020-04-22 RX ADMIN — ALBUTEROL 2 PUFF(S): 90 AEROSOL, METERED ORAL at 20:00

## 2020-04-22 RX ADMIN — Medication 600 MILLIGRAM(S): at 16:46

## 2020-04-22 RX ADMIN — Medication 2 SPRAY(S): at 11:52

## 2020-04-22 RX ADMIN — Medication 100 MILLIGRAM(S): at 16:42

## 2020-04-22 RX ADMIN — Medication 2 SPRAY(S): at 16:42

## 2020-04-22 RX ADMIN — ENOXAPARIN SODIUM 40 MILLIGRAM(S): 100 INJECTION SUBCUTANEOUS at 11:52

## 2020-04-22 RX ADMIN — Medication 103 MILLIGRAM(S): at 05:19

## 2020-04-22 RX ADMIN — Medication 2 SPRAY(S): at 16:48

## 2020-04-22 RX ADMIN — Medication 2 SPRAY(S): at 08:00

## 2020-04-22 RX ADMIN — Medication 100 MILLIGRAM(S): at 11:51

## 2020-04-22 RX ADMIN — Medication 650 MILLIGRAM(S): at 00:30

## 2020-04-22 NOTE — PROGRESS NOTE ADULT - SUBJECTIVE AND OBJECTIVE BOX
CC: dyspnea, dry cough     HPI/Subjective:     61 y/o Female with h/o thyroid CA s/p thyroidectomy  admitted on 4/16/20  with cough, fever, shortness of breath and chest pain progressively worsening since 4/7/2020.  Patient also c/o nausea and midepigastric discomfort. Patient denies leg pain/swelling. Patient has a pulse oximeter at home and noted O2 sats in the upper 80s over the last 24 hours. Patient was having persistent fever for the last 8-9 days at home. But her breathing got much worse in the last 24 hours, which prompted her ER Visit today. No recent travel. Her  is also sick at home.     4/17/20: Pt seen and examined bedside. Pt febrile this morning up to 100.7. Satting 91% on NRB. Pt was lying prone.   4/18 - patient seen and examined , + cough dry, + dyspnea , + lose bm , poor po intake, + dry throat and nose, denies cp, abdominal pain, POC discussed   4/19 - pt seen and examined, on NRB 98% , denies cp, + cough, + weakness, appetite slightly better, afebrile, POC discussed   4/20 - pt seen and examined, on NRB, does not feel better, + cough, + dyspnea, + weakness, denies cp, abdominal pain, POC discussed   4/21 - pt seen and examined, feels better, afebrile tolerates po intake, weakness improving, 95% on 8 L, 100 % on NRB , POC discussed   4/22: Patient seen and evaluated. Pt statesher breathing has improved and denies any stomach pain or discomfort. Pt overnight on 6L NC satting 92%, temperature 97.5F. This AM pt continued on 6L O2 via NC satting 99%, current temp. 97.8F.      REVIEW OF SYSTEMS: All other review of systems is negative unless indicated above.    Vital Signs Last 24 Hrs  T(C): 36.6 (22 Apr 2020 11:16), Max: 36.8 (22 Apr 2020 05:25)  T(F): 97.8 (22 Apr 2020 11:16), Max: 98.3 (22 Apr 2020 05:25)  HR: 71 (22 Apr 2020 11:16) (71 - 83)  BP: 108/68 (22 Apr 2020 11:16) (108/68 - 123/61)  BP(mean): --  RR: 19 (22 Apr 2020 11:16) (19 - 20)  SpO2: 99% (22 Apr 2020 11:16) (92% - 99%)    PHYSICAL EXAM:  Constitutional: NAD, awake and alert, well-developed  HEENT: PERR, EOMI, Normal Hearing, MMM  Neck: Soft and supple  Respiratory: Breath sounds decreased at bases,  No wheezing, rales, + rhonchi  Cardiovascular: S1 and S2, regular rate and rhythm, no Murmurs, gallops or rubs  Gastrointestinal: Bowel Sounds present, soft, nontender, nondistended, no guarding, no rebound  Extremities: No peripheral edema  Neurological: A/O x 3, no focal deficits in my limited exam    LABS:  Complete Blood Count + Automated Diff in AM (04.21.20 @ 08:47)    WBC Count: 7.71 K/uL    RBC Count: 4.10 M/uL    Hemoglobin: 11.8 g/dL    Hematocrit: 36.4 %    Mean Cell Volume: 88.8 fl    Mean Cell Hemoglobin: 28.8 pg    Mean Cell Hemoglobin Conc: 32.4 gm/dL    Red Cell Distrib Width: 13.3 %    Platelet Count - Automated: 373 K/uL    Auto Neutrophil #: 6.43 K/uL    Auto Lymphocyte #: 0.69 K/uL    Auto Monocyte #: 0.49 K/uL    Auto Eosinophil #: 0.00 K/uL    Auto Basophil #: 0.00 K/uL    Auto Neutrophil %: 83.4: Differential percentages must be correlated with absolute numbers for  clinical significance. %    Auto Lymphocyte %: 8.9 %    Auto Monocyte %: 6.4 %    Auto Eosinophil %: 0.0 %    Auto Basophil %: 0.0 %    Auto Immature Granulocyte %: 1.3 %    Comprehensive Metabolic Panel in AM (04.21.20 @ 08:47)    Sodium, Serum: 137 mmol/L    Potassium, Serum: 4.6 mmol/L    Chloride, Serum: 100 mmol/L    Carbon Dioxide, Serum: 33 mmol/L    Anion Gap, Serum: 4 mmol/L    Blood Urea Nitrogen, Serum: 23 mg/dL    Creatinine, Serum: 0.78 mg/dL    Glucose, Serum: 171 mg/dL    Calcium, Total Serum: 8.9 mg/dL    Protein Total, Serum: 7.1 gm/dL    Albumin, Serum: 2.5 g/dL    Bilirubin Total, Serum: 0.4 mg/dL    Alkaline Phosphatase, Serum: 52 U/L    Aspartate Aminotransferase (AST/SGOT): 18 U/L    Alanine Aminotransferase (ALT/SGPT): 33 U/L    eGFR if Non : 84: Interpretative comment  The units for eGFR are mL/min/1.73M2 (normalized body surface area). The  eGFR is calculated from a serum creatinine using the CKD-EPI equation.  Other variables required for calculation are race, age and sex. Among  patients with chronic kidney disease (CKD), the eGFR is useful in  determining the stage of disease according to KDOQI CKD classification.  All eGFR results are reported numerically with the following  interpretation.          GFR                    With                 Without     (ml/min/1.73 m2)    Kidney Damage       Kidney Damage        >= 90                    Stage 1                     Normal        60-89                    Stage 2                     Decreased GFR        30-59     Stage 3                     Stage 3        15-29                    Stage 4                     Stage 4        < 15                      Stage 5                     Stage 5  Each stage of CKD assumes that the associated GFR level has been in  effect for at least 3 months. Determination of stages one and two (with  eGFR > 59 ml/min/m2) requires estimation of kidney damage for at least 3  months as defined by structural or functional abnormalities.  Limitations: All estimates of GFR will be less accurate for patients at  extremes of muscle mass (including but not limited to frail elderly,  critically ill, or cancer patients), those with unusual diets, and those  with conditions associated with reduced secretion or extrarenal  elimination of creatinine. The eGFR equation is not recommended for use  in patients with unstable creatinine levels. mL/min/1.73M2    eGFR if African American: 97 mL/min/1.73M2    < from: 12 Lead ECG (04.16.20 @ 02:42) >    Ventricular Rate 85 BPM    Atrial Rate 85 BPM    P-R Interval 124 ms    QRS Duration 78 ms    Q-T Interval 360 ms    QTC Calculation(Bezet) 428 ms    P Axis 54 degrees    R Axis 67 degrees    T Axis 2 degrees    Diagnosis Line *** Poor data quality, interpretation may be adversely affected  Normal sinus rhythm  Nonspecific ST and T wave abnormality  Abnormal ECG    < end of copied text >  < from: Xray Chest 1 View- PORTABLE-Routine (04.19.20 @ 10:22) >    Comparison is made to prior study of 3 days prior    A single frontal view of the chest demonstratespredominantly peripheral alveolar opacities which are mild to moderate and grossly unchanged from prior study.    < end of copied text >            Culture - Blood (04.16.20 @ 02:35)    Specimen Source: .Blood Blood-Peripheral    Culture Results:   No growth to date.    < from: Xray Chest 1 View-PORTABLE IMMEDIATE (04.16.20 @ 03:17) >  FINDINGS:  Diffuse patchy airspace opacities throughout both lungs. Hemidiaphragms are sharp; no evidence of a pleural effusion. Cardiac silhouette size is exaggerated by AP technique. Mediastinal contours are within normal limits. The regional osseous structures are unremarkable.    IMPRESSION:    Diffuse patchy airspace opacities throughout both lungs.    < end of copied text >  MEDICATIONS  (STANDING):  ALBUTerol    90 MICROgram(s) HFA Inhaler 2 Puff(s) Inhalation every 6 hours  ascorbic acid IVPB 1500 milliGRAM(s) IV Intermittent daily  cefTRIAXone Injectable. 1000 milliGRAM(s) IV Push every 24 hours  doxycycline hyclate Capsule 100 milliGRAM(s) Oral every 12 hours  enoxaparin Injectable 40 milliGRAM(s) SubCutaneous daily  guaiFENesin  milliGRAM(s) Oral every 12 hours  hydroxychloroquine   Oral   hydroxychloroquine 400 milliGRAM(s) Oral every 24 hours  lactobacillus acidophilus 1 Tablet(s) Oral three times a day with meals  levothyroxine 112 MICROGram(s) Oral daily  methylPREDNISolone sodium succinate Injectable 40 milliGRAM(s) IV Push every 12 hours  pantoprazole    Tablet 40 milliGRAM(s) Oral before breakfast  sodium chloride 0.65% Nasal 2 Spray(s) Both Nostrils every 2 hours    MEDICATIONS  (PRN):  acetaminophen   Tablet .. 650 milliGRAM(s) Oral every 4 hours PRN Temp greater or equal to 38C (100.4F), Mild Pain (1 - 3)  ALBUTerol    90 MICROgram(s) HFA Inhaler 2 Puff(s) Inhalation every 4 hours PRN Shortness of Breath and/or Wheezing  hydrocodone/homatropine Syrup 5 milliLiter(s) Oral every 6 hours PRN Cough  ondansetron Injectable 4 milliGRAM(s) IV Push every 6 hours PRN Nausea and/or Vomiting CC: dyspnea, dry cough     HPI/Subjective:     59 y/o Female with h/o thyroid CA s/p thyroidectomy  admitted on 4/16/20  with cough, fever, shortness of breath and chest pain progressively worsening since 4/7/2020.  Patient also c/o nausea and midepigastric discomfort. Patient denies leg pain/swelling. Patient has a pulse oximeter at home and noted O2 sats in the upper 80s over the last 24 hours. Patient was having persistent fever for the last 8-9 days at home. But her breathing got much worse in the last 24 hours, which prompted her ER Visit today. No recent travel. Her  is also sick at home.     4/17/20: Pt seen and examined bedside. Pt febrile this morning up to 100.7. Satting 91% on NRB. Pt was lying prone.   4/18 - patient seen and examined , + cough dry, + dyspnea , + lose bm , poor po intake, + dry throat and nose, denies cp, abdominal pain, POC discussed   4/19 - pt seen and examined, on NRB 98% , denies cp, + cough, + weakness, appetite slightly better, afebrile, POC discussed   4/20 - pt seen and examined, on NRB, does not feel better, + cough, + dyspnea, + weakness, denies cp, abdominal pain, POC discussed   4/21 - pt seen and examined, feels better, afebrile tolerates po intake, weakness improving, 95% on 8 L, 100 % on NRB , POC discussed   4/22: Patient seen and evaluated. Pt statesher breathing has improved and denies any stomach pain or discomfort. Pt overnight on 6L NC satting 92%, temperature 97.5F. This AM pt continued on 6L O2 via NC satting 99%, current temp. 97.8F.      REVIEW OF SYSTEMS: All other review of systems is negative unless indicated above.    Vital Signs Last 24 Hrs  T(C): 36.6 (22 Apr 2020 11:16), Max: 36.8 (22 Apr 2020 05:25)  T(F): 97.8 (22 Apr 2020 11:16), Max: 98.3 (22 Apr 2020 05:25)  HR: 71 (22 Apr 2020 11:16) (71 - 83)  BP: 108/68 (22 Apr 2020 11:16) (108/68 - 123/61)  BP(mean): --  RR: 19 (22 Apr 2020 11:16) (19 - 20)  SpO2: 99% (22 Apr 2020 11:16) (92% - 99%)    PHYSICAL EXAM:  Constitutional: NAD, awake and alert, well-developed  HEENT: PERR, EOMI, Normal Hearing, MMM  Neck: Soft and supple  Respiratory: Breath sounds decreased at bases,  No wheezing, rales, + rhonchi  Cardiovascular: S1 and S2, regular rate and rhythm, no Murmurs, gallops or rubs  Gastrointestinal: Bowel Sounds present, soft, nontender, nondistended, no guarding, no rebound  Extremities: No peripheral edema  Neurological: A/O x 3, no focal deficits in my limited exam    LABS:        04-21    137  |  100  |  23  ----------------------------<  171<H>  4.6   |  33<H>  |  0.78    Ca    8.9      21 Apr 2020 08:47    TPro  7.1  /  Alb  2.5<L>  /  TBili  0.4  /  DBili  x   /  AST  18  /  ALT  33  /  AlkPhos  52  04-21    C-Reactive Protein, Serum (04.21.20 @ 08:47)    C-Reactive Protein, Serum: 1.67 mg/dL    Procalcitonin, Serum (04.21.20 @ 08:47)    Procalcitonin, Serum: 0.23:                         11.8   7.71  )-----------( 373      ( 21 Apr 2020 08:47 )             36.4             LIVER FUNCTIONS - ( 21 Apr 2020 08:47 )  Alb: 2.5 g/dL / Pro: 7.1 gm/dL / ALK PHOS: 52 U/L / ALT: 33 U/L / AST: 18 U/L / GGT: x                   < from: 12 Lead ECG (04.16.20 @ 02:42) >    Ventricular Rate 85 BPM    Atrial Rate 85 BPM    P-R Interval 124 ms    QRS Duration 78 ms    Q-T Interval 360 ms    QTC Calculation(Bezet) 428 ms    P Axis 54 degrees    R Axis 67 degrees    T Axis 2 degrees    Diagnosis Line *** Poor data quality, interpretation may be adversely affected  Normal sinus rhythm  Nonspecific ST and T wave abnormality  Abnormal ECG    < end of copied text >  < from: Xray Chest 1 View- PORTABLE-Routine (04.19.20 @ 10:22) >    Comparison is made to prior study of 3 days prior    A single frontal view of the chest demonstratespredominantly peripheral alveolar opacities which are mild to moderate and grossly unchanged from prior study.    < end of copied text >            Culture - Blood (04.16.20 @ 02:35)    Specimen Source: .Blood Blood-Peripheral    Culture Results:   No growth to date.    < from: Xray Chest 1 View-PORTABLE IMMEDIATE (04.16.20 @ 03:17) >  FINDINGS:  Diffuse patchy airspace opacities throughout both lungs. Hemidiaphragms are sharp; no evidence of a pleural effusion. Cardiac silhouette size is exaggerated by AP technique. Mediastinal contours are within normal limits. The regional osseous structures are unremarkable.    IMPRESSION:    Diffuse patchy airspace opacities throughout both lungs.    < end of copied text >  MEDICATIONS  (STANDING):  ALBUTerol    90 MICROgram(s) HFA Inhaler 2 Puff(s) Inhalation every 6 hours  ascorbic acid IVPB 1500 milliGRAM(s) IV Intermittent daily  cefTRIAXone Injectable. 1000 milliGRAM(s) IV Push every 24 hours  doxycycline hyclate Capsule 100 milliGRAM(s) Oral every 12 hours  enoxaparin Injectable 40 milliGRAM(s) SubCutaneous daily  guaiFENesin  milliGRAM(s) Oral every 12 hours  hydroxychloroquine   Oral   hydroxychloroquine 400 milliGRAM(s) Oral every 24 hours  lactobacillus acidophilus 1 Tablet(s) Oral three times a day with meals  levothyroxine 112 MICROGram(s) Oral daily  methylPREDNISolone sodium succinate Injectable 40 milliGRAM(s) IV Push every 12 hours  pantoprazole    Tablet 40 milliGRAM(s) Oral before breakfast  sodium chloride 0.65% Nasal 2 Spray(s) Both Nostrils every 2 hours    MEDICATIONS  (PRN):  acetaminophen   Tablet .. 650 milliGRAM(s) Oral every 4 hours PRN Temp greater or equal to 38C (100.4F), Mild Pain (1 - 3)  ALBUTerol    90 MICROgram(s) HFA Inhaler 2 Puff(s) Inhalation every 4 hours PRN Shortness of Breath and/or Wheezing  hydrocodone/homatropine Syrup 5 milliLiter(s) Oral every 6 hours PRN Cough  ondansetron Injectable 4 milliGRAM(s) IV Push every 6 hours PRN Nausea and/or Vomiting

## 2020-04-22 NOTE — PROGRESS NOTE ADULT - ATTENDING COMMENTS
Patient seen and examined  on rounds with  Dr. Gabriela Gonsalez .  I was physically present for the key portion of the evaluation and management service provided . I  examined the patient myself and reviewed the plan of care with the patient and  Dr. Gabriela Gonsalez    , which I have reviewed and edited .  Medical records reviewed. History, review of systems, physical findings and lab results as documented confirmed , except as modified by me.  Agree with the assessment and plan of  as stated and discussed, except as modified below.   Patient seen during COVID-19 pandemia.

## 2020-04-22 NOTE — PROGRESS NOTE ADULT - ASSESSMENT
59 y/o Female with h/o thyroid CA s/p thyroidectomy  admitted on 4/16/20  with cough, fever, shortness of breath and chest pain progressively worsening since 4/7/2020.  Patient also c/o nausea and midepigastric discomfort. Patient denies leg pain/swelling. Patient has a pulse oximeter at home and noted O2 sats in the upper 80s over the last 24 hours. Patient was having persistent fever for the last 8-9 days at home. But her breathing got much worse in the last 24 hours, which prompted her ER Visit today. No recent travel. Her  is also sick at home.     A/P:  * Acute Hypoxic Respiratory Failure due to COVID-19 viral pneumonia with suspected superimposed CAP   - isolation precautions,  - O2 supplementation on NRB 96 --> 100% , down -titrate  if able to 95% on 8 L   - baseline procalcitonin 6--> 0.6 ,  CRP 21--> 6  , ferritin 1000,  , d-dimers 165 , trend every 48-72 h   - hydroxychloroquine as per protocol 5 days course ( risks and side effects discussed with patient )   - EKG reviewed on admission and QTc < 500 msec. No need for further cardiac monitoring.  - start /continue statin - pravastatin 40 hs  , monitor LFTs   - IV steroids 40 bid with ppi  day #2   - IV ceftriaxone and doxycycline , trial of IV vit C 1.5 gm bid  x 3 days   - 4/19 - lasix 20 x 1 dose, 4/20 - lasix 20 x 1 dose   - antipyretics, mucolytics, beta-agonist inhalers, incentive spirometry,  nasal saline irrigation, supportive care.    - DVT proph - Lovenox 40 mg SQ daily   -  ID consult consult - for need of  initiation of anakinra/tocilizumab/salimumab   - check O2 sat on 6 L - < 88 %   - 4/20 start anakinra as per protocol, check Il-1, Il-6 baseline markers   - CXR 4/19 - no change   * Hypothyroidism  -Levothyroxine  112, free T4 slightly elevated , o/p monitoring   * Hyponatremia hypovolemic  - encourage po liquids, add supplements     Lovenox for DVT ppx.   Advanced directives - Goals of Care discussion had with patient : FULL CODE, 17 minutes spend     Dispo - c/w anakinra, supportive care 61 y/o Female with h/o thyroid CA s/p thyroidectomy  admitted on 4/16/20  with cough, fever, shortness of breath and chest pain progressively worsening since 4/7/2020.  Patient also c/o nausea and midepigastric discomfort. Patient denies leg pain/swelling. Patient has a pulse oximeter at home and noted O2 sats in the upper 80s over the last 24 hours. Patient was having persistent fever for the last 8-9 days at home. But her breathing got much worse in the last 24 hours, which prompted her ER Visit today. No recent travel. Her  is also sick at home.     A/P:  * Acute Hypoxic Respiratory Failure due to COVID-19 viral pneumonia with suspected superimposed CAP   - isolation precautions,  - O2 supplementation on NRB 96 --> 100% , down -titrate  if able to 95% on 8 L   - baseline procalcitonin 6--> 0.6--> 0.23 ,  CRP 21--> 6 --> 1.6 , ferritin 1000,  , d-dimers 165 , trend every 48-72 h   - hydroxychloroquine as per protocol 5 days course ( risks and side effects discussed with patient )   - EKG reviewed on admission and QTc < 500 msec. No need for further cardiac monitoring.  - start /continue statin - pravastatin 40 hs  , monitor LFTs   - IV steroids 40 bid with ppi  day #2   - s/p 5 days of IV ceftriaxone and doxycycline , trial of IV vit C 1.5 gm bid  x 3 days   - 4/19 - lasix 20 x 1 dose, 4/20 - lasix 20 x 1 dose   - antipyretics, mucolytics, beta-agonist inhalers, incentive spirometry,  nasal saline irrigation, supportive care.    - DVT proph - Lovenox 40 mg SQ daily   -  ID consult consult - for need of  initiation of anakinra/tocilizumab/salimumab   - check O2 sat on 6 L - < 88 %   - 4/20 start anakinra as per protocol, check Il-1, Il-6 baseline markers   - CXR 4/19 - no change   - CXR in am 4/23/20  * Hypothyroidism  -Levothyroxine  112, free T4 slightly elevated , o/p monitoring   * Hyponatremia hypovolemic  - encourage po liquids, add supplements     Lovenox for DVT ppx.   Advanced directives - Goals of Care discussion had with patient : FULL CODE, 17 minutes spend     Dispo - c/w anakinra, supportive care , CXR in am

## 2020-04-23 LAB
ALBUMIN SERPL ELPH-MCNC: 2.6 G/DL — LOW (ref 3.3–5)
ALP SERPL-CCNC: 51 U/L — SIGNIFICANT CHANGE UP (ref 40–120)
ALT FLD-CCNC: 34 U/L — SIGNIFICANT CHANGE UP (ref 12–78)
ANION GAP SERPL CALC-SCNC: 5 MMOL/L — SIGNIFICANT CHANGE UP (ref 5–17)
AST SERPL-CCNC: 20 U/L — SIGNIFICANT CHANGE UP (ref 15–37)
BASOPHILS # BLD AUTO: 0 K/UL — SIGNIFICANT CHANGE UP (ref 0–0.2)
BASOPHILS NFR BLD AUTO: 0 % — SIGNIFICANT CHANGE UP (ref 0–2)
BILIRUB SERPL-MCNC: 0.5 MG/DL — SIGNIFICANT CHANGE UP (ref 0.2–1.2)
BUN SERPL-MCNC: 21 MG/DL — SIGNIFICANT CHANGE UP (ref 7–23)
CALCIUM SERPL-MCNC: 9.1 MG/DL — SIGNIFICANT CHANGE UP (ref 8.5–10.1)
CHLORIDE SERPL-SCNC: 98 MMOL/L — SIGNIFICANT CHANGE UP (ref 96–108)
CO2 SERPL-SCNC: 32 MMOL/L — HIGH (ref 22–31)
CREAT SERPL-MCNC: 0.87 MG/DL — SIGNIFICANT CHANGE UP (ref 0.5–1.3)
CRP SERPL-MCNC: 0.8 MG/DL — HIGH (ref 0–0.4)
EOSINOPHIL # BLD AUTO: 0.17 K/UL — SIGNIFICANT CHANGE UP (ref 0–0.5)
EOSINOPHIL NFR BLD AUTO: 1.7 % — SIGNIFICANT CHANGE UP (ref 0–6)
GLUCOSE SERPL-MCNC: 134 MG/DL — HIGH (ref 70–99)
HCT VFR BLD CALC: 40.5 % — SIGNIFICANT CHANGE UP (ref 34.5–45)
HGB BLD-MCNC: 13.1 G/DL — SIGNIFICANT CHANGE UP (ref 11.5–15.5)
IL6 FLD-MCNC: 4.1 PG/ML — SIGNIFICANT CHANGE UP (ref 0–15.5)
IMM GRANULOCYTES NFR BLD AUTO: 2 % — HIGH (ref 0–1.5)
LYMPHOCYTES # BLD AUTO: 1.94 K/UL — SIGNIFICANT CHANGE UP (ref 1–3.3)
LYMPHOCYTES # BLD AUTO: 19.5 % — SIGNIFICANT CHANGE UP (ref 13–44)
MCHC RBC-ENTMCNC: 29 PG — SIGNIFICANT CHANGE UP (ref 27–34)
MCHC RBC-ENTMCNC: 32.3 GM/DL — SIGNIFICANT CHANGE UP (ref 32–36)
MCV RBC AUTO: 89.8 FL — SIGNIFICANT CHANGE UP (ref 80–100)
MONOCYTES # BLD AUTO: 0.61 K/UL — SIGNIFICANT CHANGE UP (ref 0–0.9)
MONOCYTES NFR BLD AUTO: 6.1 % — SIGNIFICANT CHANGE UP (ref 2–14)
NEUTROPHILS # BLD AUTO: 7.02 K/UL — SIGNIFICANT CHANGE UP (ref 1.8–7.4)
NEUTROPHILS NFR BLD AUTO: 70.7 % — SIGNIFICANT CHANGE UP (ref 43–77)
PLATELET # BLD AUTO: 447 K/UL — HIGH (ref 150–400)
POTASSIUM SERPL-MCNC: 4.5 MMOL/L — SIGNIFICANT CHANGE UP (ref 3.5–5.3)
POTASSIUM SERPL-SCNC: 4.5 MMOL/L — SIGNIFICANT CHANGE UP (ref 3.5–5.3)
PROCALCITONIN SERPL-MCNC: 0.14 NG/ML — HIGH (ref 0.02–0.1)
PROT SERPL-MCNC: 7.2 GM/DL — SIGNIFICANT CHANGE UP (ref 6–8.3)
RBC # BLD: 4.51 M/UL — SIGNIFICANT CHANGE UP (ref 3.8–5.2)
RBC # FLD: 13.3 % — SIGNIFICANT CHANGE UP (ref 10.3–14.5)
SODIUM SERPL-SCNC: 135 MMOL/L — SIGNIFICANT CHANGE UP (ref 135–145)
WBC # BLD: 9.94 K/UL — SIGNIFICANT CHANGE UP (ref 3.8–10.5)
WBC # FLD AUTO: 9.94 K/UL — SIGNIFICANT CHANGE UP (ref 3.8–10.5)

## 2020-04-23 PROCEDURE — 71045 X-RAY EXAM CHEST 1 VIEW: CPT | Mod: 26

## 2020-04-23 PROCEDURE — 99232 SBSQ HOSP IP/OBS MODERATE 35: CPT

## 2020-04-23 RX ADMIN — Medication 2 SPRAY(S): at 21:15

## 2020-04-23 RX ADMIN — ALBUTEROL 2 PUFF(S): 90 AEROSOL, METERED ORAL at 09:11

## 2020-04-23 RX ADMIN — ALBUTEROL 2 PUFF(S): 90 AEROSOL, METERED ORAL at 17:58

## 2020-04-23 RX ADMIN — Medication 2 SPRAY(S): at 20:00

## 2020-04-23 RX ADMIN — Medication 650 MILLIGRAM(S): at 05:42

## 2020-04-23 RX ADMIN — Medication 2 SPRAY(S): at 10:32

## 2020-04-23 RX ADMIN — POLYETHYLENE GLYCOL 3350 17 GRAM(S): 17 POWDER, FOR SOLUTION ORAL at 11:48

## 2020-04-23 RX ADMIN — Medication 2 SPRAY(S): at 00:10

## 2020-04-23 RX ADMIN — Medication 2 SPRAY(S): at 17:58

## 2020-04-23 RX ADMIN — Medication 80 MILLIGRAM(S): at 00:09

## 2020-04-23 RX ADMIN — Medication 100 MILLIGRAM(S): at 00:09

## 2020-04-23 RX ADMIN — Medication 2 SPRAY(S): at 11:48

## 2020-04-23 RX ADMIN — PANTOPRAZOLE SODIUM 40 MILLIGRAM(S): 20 TABLET, DELAYED RELEASE ORAL at 05:39

## 2020-04-23 RX ADMIN — Medication 1 TABLET(S): at 09:10

## 2020-04-23 RX ADMIN — Medication 1 TABLET(S): at 16:42

## 2020-04-23 RX ADMIN — Medication 600 MILLIGRAM(S): at 05:39

## 2020-04-23 RX ADMIN — Medication 2 SPRAY(S): at 09:11

## 2020-04-23 RX ADMIN — ENOXAPARIN SODIUM 40 MILLIGRAM(S): 100 INJECTION SUBCUTANEOUS at 11:47

## 2020-04-23 RX ADMIN — Medication 80 MILLIGRAM(S): at 21:27

## 2020-04-23 RX ADMIN — Medication 2 SPRAY(S): at 15:29

## 2020-04-23 RX ADMIN — Medication 650 MILLIGRAM(S): at 11:48

## 2020-04-23 RX ADMIN — Medication 2 SPRAY(S): at 12:09

## 2020-04-23 RX ADMIN — Medication 2 SPRAY(S): at 05:39

## 2020-04-23 RX ADMIN — Medication 650 MILLIGRAM(S): at 00:10

## 2020-04-23 RX ADMIN — Medication 112 MICROGRAM(S): at 05:39

## 2020-04-23 RX ADMIN — Medication 650 MILLIGRAM(S): at 21:15

## 2020-04-23 RX ADMIN — Medication 1 TABLET(S): at 11:47

## 2020-04-23 RX ADMIN — Medication 600 MILLIGRAM(S): at 16:42

## 2020-04-23 RX ADMIN — Medication 100 MILLIGRAM(S): at 05:39

## 2020-04-23 NOTE — PROGRESS NOTE ADULT - ASSESSMENT
59 y/o Female with h/o thyroid CA s/p thyroidectomy admitted on 4/16 for evaluation of shortness of breath, fever and cough associated with worsening midepigastric discomfort over the last week; has had fever and chills; no sick contacts and no recent travel; over last 24 hours worsened and came to [facility].    1. Patient admitted with viral syndrome secondary to COVID-19 infection and secondary superimposed pneumonia  - follow up cultures   - serial cbc and monitor temperature   - oxygen and nebs as needed   - reviewed prior medical records to evaluate for resistant or atypical pathogens   - continue isolation   - started on hydroxychloroquine on 4/16 and continued until 4/20  - completed steroids and antibiotics  - marked improvement in markers of inflammation; will stop anakinra today  2. other issues; per medicine

## 2020-04-23 NOTE — PROGRESS NOTE ADULT - ASSESSMENT
61 y/o Female with h/o thyroid CA s/p thyroidectomy  admitted on 4/16/20  with cough, fever, shortness of breath and chest pain progressively worsening since 4/7/2020.  Patient also c/o nausea and midepigastric discomfort. Patient denies leg pain/swelling. Patient has a pulse oximeter at home and noted O2 sats in the upper 80s over the last 24 hours. Patient was having persistent fever for the last 8-9 days at home. But her breathing got much worse in the last 24 hours, which prompted her ER Visit today. No recent travel. Her  is also sick at home.     A/P:  * Acute Hypoxic Respiratory Failure due to COVID-19 viral pneumonia with suspected superimposed CAP   - isolation precautions,  - O2 supplementation on NRB 96 --> 100% , down -titrate  if able to 95% on 8 L   - baseline procalcitonin 6--> 0.6--> 0.23 ,  CRP 21--> 6 --> 1.6 , ferritin 1000,  , d-dimers 165 , trend every 48-72 h   - hydroxychloroquine as per protocol 5 days course ( risks and side effects discussed with patient )   - EKG reviewed on admission and QTc < 500 msec. No need for further cardiac monitoring.  - start /continue statin - pravastatin 40 hs  , monitor LFTs   - IV steroids 40 bid with ppi  day #2   - s/p 5 days of IV ceftriaxone and doxycycline , trial of IV vit C 1.5 gm bid  x 3 days   - 4/19 - lasix 20 x 1 dose, 4/20 - lasix 20 x 1 dose   - antipyretics, mucolytics, beta-agonist inhalers, incentive spirometry,  nasal saline irrigation, supportive care.    - DVT proph - Lovenox 40 mg SQ daily   -  ID consult consult - for need of  initiation of anakinra/tocilizumab/salimumab   - check O2 sat on 6 L - < 88 %   - 4/20 start anakinra as per protocol, check Il-1, Il-6 baseline markers   - CXR 4/19 - no change   - CXR in am 4/23/20  * Hypothyroidism  -Levothyroxine  112, free T4 slightly elevated , o/p monitoring   * Hyponatremia hypovolemic  - encourage po liquids, add supplements     Lovenox for DVT ppx.   Advanced directives - Goals of Care discussion had with patient : FULL CODE, 17 minutes spend     Patient stable for discharge from COVID-19 related infection but still requiring to use O2 supplementation to support adequate oxygention.   Patient has acute diagnosis of COVID- 19  and requires home O2 to increase hospital capacity and mitigate risk.   Dispo - c/w anakinra, supportive care , CXR stable

## 2020-04-23 NOTE — PROGRESS NOTE ADULT - SUBJECTIVE AND OBJECTIVE BOX
CC: dyspnea, dry cough     HPI/Subjective:     59 y/o Female with h/o thyroid CA s/p thyroidectomy  admitted on 4/16/20  with cough, fever, shortness of breath and chest pain progressively worsening since 4/7/2020.  Patient also c/o nausea and midepigastric discomfort. Patient denies leg pain/swelling. Patient has a pulse oximeter at home and noted O2 sats in the upper 80s over the last 24 hours. Patient was having persistent fever for the last 8-9 days at home. But her breathing got much worse in the last 24 hours, which prompted her ER Visit today. No recent travel. Her  is also sick at home.     4/17/20: Pt seen and examined bedside. Pt febrile this morning up to 100.7. Satting 91% on NRB. Pt was lying prone.   4/18 - patient seen and examined , + cough dry, + dyspnea , + lose bm , poor po intake, + dry throat and nose, denies cp, abdominal pain, POC discussed   4/19 - pt seen and examined, on NRB 98% , denies cp, + cough, + weakness, appetite slightly better, afebrile, POC discussed   4/20 - pt seen and examined, on NRB, does not feel better, + cough, + dyspnea, + weakness, denies cp, abdominal pain, POC discussed   4/21 - pt seen and examined, feels better, afebrile tolerates po intake, weakness improving, 95% on 8 L, 100 % on NRB , POC discussed   4/22: Patient seen and evaluated. Pt statesher breathing has improved and denies any stomach pain or discomfort. Pt overnight on 6L NC satting 92%, temperature 97.5F. This AM pt continued on 6L O2 via NC satting 99%, current temp. 97.8F.    4/23- pt seen and examined, feels better, cough  improved, afebrile, tolerates po intake, POC discussed    REVIEW OF SYSTEMS: All other review of systems is negative unless indicated above.    T(C): 36.6 (04-23-20 @ 10:44), Max: 36.9 (04-23-20 @ 05:23)  T(F): 97.8 (04-23-20 @ 10:44), Max: 98.5 (04-23-20 @ 05:23)  HR: 82 (04-23-20 @ 10:44) (68 - 82)  BP: 104/64 (04-23-20 @ 10:44) (101/61 - 116/73)  RR: 18 (04-23-20 @ 10:44) (18 - 20)  SpO2: 99% (04-23-20 @ 10:44) (95% - 99%)  Wt(kg): --    PHYSICAL EXAM:  Constitutional: NAD, awake and alert, well-developed  HEENT: PERR, EOMI, Normal Hearing, MMM  Neck: Soft and supple  Respiratory: Breath sounds decreased at bases,  No wheezing, rales, + rhonchi  Cardiovascular: S1 and S2, regular rate and rhythm, no Murmurs, gallops or rubs  Gastrointestinal: Bowel Sounds present, soft, nontender, nondistended, no guarding, no rebound  Extremities: No peripheral edema  Neurological: A/O x 3, no focal deficits in my limited exam    LABS:  04-23    135  |  98  |  21  ----------------------------<  134<H>  4.5   |  32<H>  |  0.87    Ca    9.1      23 Apr 2020 08:42    TPro  7.2  /  Alb  2.6<L>  /  TBili  0.5  /  DBili  x   /  AST  20  /  ALT  34  /  AlkPhos  51  04-23                        13.1   9.94  )-----------( 447      ( 23 Apr 2020 08:42 )             40.5     LIVER FUNCTIONS - ( 23 Apr 2020 08:42 )  Alb: 2.6 g/dL / Pro: 7.2 gm/dL / ALK PHOS: 51 U/L / ALT: 34 U/L / AST: 20 U/L / GGT: x                   04-21    137  |  100  |  23  ----------------------------<  171<H>  4.6   |  33<H>  |  0.78    Ca    8.9      21 Apr 2020 08:47    TPro  7.1  /  Alb  2.5<L>  /  TBili  0.4  /  DBili  x   /  AST  18  /  ALT  33  /  AlkPhos  52  04-21    C-Reactive Protein, Serum (04.21.20 @ 08:47)    C-Reactive Protein, Serum: 1.67 mg/dL    Procalcitonin, Serum (04.21.20 @ 08:47)    Procalcitonin, Serum: 0.23:                         11.8   7.71  )-----------( 373      ( 21 Apr 2020 08:47 )             36.4       LIVER FUNCTIONS - ( 21 Apr 2020 08:47 )  Alb: 2.5 g/dL / Pro: 7.1 gm/dL / ALK PHOS: 52 U/L / ALT: 33 U/L / AST: 18 U/L / GGT: x           < from: 12 Lead ECG (04.16.20 @ 02:42) >    Ventricular Rate 85 BPM    Atrial Rate 85 BPM    P-R Interval 124 ms    QRS Duration 78 ms    Q-T Interval 360 ms    QTC Calculation(Bezet) 428 ms    P Axis 54 degrees    R Axis 67 degrees    T Axis 2 degrees    Diagnosis Line *** Poor data quality, interpretation may be adversely affected  Normal sinus rhythm  Nonspecific ST and T wave abnormality  Abnormal ECG    < end of copied text >  < from: Xray Chest 1 View- PORTABLE-Routine (04.19.20 @ 10:22) >    Comparison is made to prior study of 3 days prior    A single frontal view of the chest demonstratespredominantly peripheral alveolar opacities which are mild to moderate and grossly unchanged from prior study.    < end of copied text >    < from: Xray Chest 1 View- PORTABLE-Routine (04.23.20 @ 09:00) >  FINDINGS:   The lungs  show show stable scattered and diffuse multifocal ill-defined airspace consolidations    The heart and mediastinum arewithin normal limits.    Visualized osseous structures are intact.        IMPRESSION: Stable scattered and diffuse multifocal ill-defined airspace consolidations      < end of copied text >          Culture - Blood (04.16.20 @ 02:35)    Specimen Source: .Blood Blood-Peripheral    Culture Results:   No growth to date.    < from: Xray Chest 1 View-PORTABLE IMMEDIATE (04.16.20 @ 03:17) >  FINDINGS:  Diffuse patchy airspace opacities throughout both lungs. Hemidiaphragms are sharp; no evidence of a pleural effusion. Cardiac silhouette size is exaggerated by AP technique. Mediastinal contours are within normal limits. The regional osseous structures are unremarkable.    IMPRESSION:    Diffuse patchy airspace opacities throughout both lungs.    < end of copied text >  MEDICATIONS  (STANDING):  ALBUTerol    90 MICROgram(s) HFA Inhaler 2 Puff(s) Inhalation every 6 hours  ascorbic acid IVPB 1500 milliGRAM(s) IV Intermittent daily  cefTRIAXone Injectable. 1000 milliGRAM(s) IV Push every 24 hours  doxycycline hyclate Capsule 100 milliGRAM(s) Oral every 12 hours  enoxaparin Injectable 40 milliGRAM(s) SubCutaneous daily  guaiFENesin  milliGRAM(s) Oral every 12 hours  hydroxychloroquine   Oral   hydroxychloroquine 400 milliGRAM(s) Oral every 24 hours  lactobacillus acidophilus 1 Tablet(s) Oral three times a day with meals  levothyroxine 112 MICROGram(s) Oral daily  methylPREDNISolone sodium succinate Injectable 40 milliGRAM(s) IV Push every 12 hours  pantoprazole    Tablet 40 milliGRAM(s) Oral before breakfast  sodium chloride 0.65% Nasal 2 Spray(s) Both Nostrils every 2 hours    MEDICATIONS  (PRN):  acetaminophen   Tablet .. 650 milliGRAM(s) Oral every 4 hours PRN Temp greater or equal to 38C (100.4F), Mild Pain (1 - 3)  ALBUTerol    90 MICROgram(s) HFA Inhaler 2 Puff(s) Inhalation every 4 hours PRN Shortness of Breath and/or Wheezing  hydrocodone/homatropine Syrup 5 milliLiter(s) Oral every 6 hours PRN Cough  ondansetron Injectable 4 milliGRAM(s) IV Push every 6 hours PRN Nausea and/or Vomiting

## 2020-04-23 NOTE — PROGRESS NOTE ADULT - SUBJECTIVE AND OBJECTIVE BOX
Date of service: 04-23-20 @ 12:34    Patient improved; oxygenation via nasal cannula        ROS: no fever or chills; denies dizziness, no HA,  no abdominal pain, no diarrhea or constipation; no dysuria, no urinary frequency, no legs pain, no rashes    MEDICATIONS  (STANDING):  ALBUTerol    90 MICROgram(s) HFA Inhaler 2 Puff(s) Inhalation every 6 hours  enoxaparin Injectable 40 milliGRAM(s) SubCutaneous daily  guaiFENesin  milliGRAM(s) Oral every 12 hours  lactobacillus acidophilus 1 Tablet(s) Oral three times a day with meals  levothyroxine 112 MICROGram(s) Oral daily  pantoprazole    Tablet 40 milliGRAM(s) Oral before breakfast  polyethylene glycol 3350 17 Gram(s) Oral daily  pravastatin 80 milliGRAM(s) Oral at bedtime  sodium chloride 0.65% Nasal 2 Spray(s) Both Nostrils every 2 hours    MEDICATIONS  (PRN):  acetaminophen   Tablet .. 650 milliGRAM(s) Oral every 4 hours PRN Temp greater or equal to 38C (100.4F), Mild Pain (1 - 3)  ALBUTerol    90 MICROgram(s) HFA Inhaler 2 Puff(s) Inhalation every 4 hours PRN Shortness of Breath and/or Wheezing  hydrocodone/homatropine Syrup 5 milliLiter(s) Oral every 6 hours PRN Cough  ondansetron Injectable 4 milliGRAM(s) IV Push every 6 hours PRN Nausea and/or Vomiting      Vital Signs Last 24 Hrs  T(C): 36.6 (23 Apr 2020 10:44), Max: 36.9 (23 Apr 2020 05:23)  T(F): 97.8 (23 Apr 2020 10:44), Max: 98.5 (23 Apr 2020 05:23)  HR: 82 (23 Apr 2020 10:44) (68 - 82)  BP: 104/64 (23 Apr 2020 10:44) (101/61 - 116/73)  BP(mean): --  RR: 18 (23 Apr 2020 10:44) (18 - 20)  SpO2: 99% (23 Apr 2020 10:44) (95% - 99%)        Physical Exam:        Physical Exam:            Constitutional: frail looking  HEENT: NC/AT, EOMI, PERRLA, conjunctivae clear; ears and nose atraumatic; pharynx clear  Neck: supple; thyroid not palpable  Back: no tenderness  Respiratory: respiratory effort normal; clear to auscultation  Cardiovascular: S1S2 regular, no murmurs  Abdomen: soft, not tender, not distended, positive BS; no liver or spleen organomegaly  Genitourinary: no suprapubic tenderness  Musculoskeletal: no muscle tenderness, no joint swelling or tenderness  Neurological/ Psychiatric: AxOx3, judgement and insight normal;  moving all extremities  Skin: no rashes; no palpable lesions    Labs: all available labs reviewed      Labs:                          Labs:                        13.1   9.94  )-----------( 447      ( 23 Apr 2020 08:42 )             40.5     04-23    135  |  98  |  21  ----------------------------<  134<H>  4.5   |  32<H>  |  0.87    Ca    9.1      23 Apr 2020 08:42    TPro  7.2  /  Alb  2.6<L>  /  TBili  0.5  /  DBili  x   /  AST  20  /  ALT  34  /  AlkPhos  51  04-23           Cultures:       C-Reactive Protein, Serum: 0.80 mg/dL (04-23-20 @ 08:42)  C-Reactive Protein, Serum: 1.67 mg/dL (04-21-20 @ 08:47)  D-Dimer Assay, Quantitative: <150 ng/mL DDU (04-21-20 @ 08:47)  Ferritin, Serum: 615 ng/mL (04-19-20 @ 16:00)  C-Reactive Protein, Serum: 6.67 mg/dL (04-19-20 @ 10:01)  D-Dimer Assay, Quantitative: 156 ng/mL DDU (04-19-20 @ 10:01)  C-Reactive Protein, Serum: 21.49 mg/dL (04-16-20 @ 02:35)  D-Dimer Assay, Quantitative: 165 ng/mL DDU (04-16-20 @ 02:35)  Ferritin, Serum: 1090 ng/mL (04-16-20 @ 02:35)      COVID-19 PCR . (04.16.20 @ 02:35)    COVID-19 PCR: Detected: Test Name:COVID-19  Called by:salo  Called to:Leigh [facility] Mejia  Read back 2 Pt IDs:y Read back values:y Date/Tm:04/16/20 04:11  This test has been validated by [employer] [facility]/ to be accurate;  though it has not been FDA cleared/approved by the usual pathway.  As  with all laboratory tests, results should be correlated with clinical  findings.  https://www.fda.gov/media/182782/download  https://www.fda.gov/media/356551/download      < from: Xray Chest 1 View-PORTABLE IMMEDIATE (04.16.20 @ 03:17) >    EXAM:  XR CHEST PORTABLE IMMED 1V                            PROCEDURE DATE:  04/16/2020          INTERPRETATION:  CLINICAL INFORMATION:  Shortness of Breath, Cough,  Fever    TECHNIQUE:  AP view of the chest.    COMPARISON:  None available.    FINDINGS:  Diffuse patchy airspace opacities throughout both lungs. Hemidiaphragms are sharp; no evidence of a pleural effusion. Cardiac silhouette size is exaggerated by AP technique. Mediastinal contours are within normal limits. The regional osseous structures are unremarkable.    IMPRESSION:    Diffuse patchy airspace opacities throughout both lungs.    < end of copied text >      Radiology: all available radiological tests reviewed    Advanced directives addressed: full resuscitation

## 2020-04-24 ENCOUNTER — TRANSCRIPTION ENCOUNTER (OUTPATIENT)
Age: 60
End: 2020-04-24

## 2020-04-24 LAB
ADD ON TEST-SPECIMEN IN LAB: SIGNIFICANT CHANGE UP
IL1 SERPL-MCNC: < 3.9 PG/ML — SIGNIFICANT CHANGE UP

## 2020-04-24 PROCEDURE — 93010 ELECTROCARDIOGRAM REPORT: CPT

## 2020-04-24 PROCEDURE — 99233 SBSQ HOSP IP/OBS HIGH 50: CPT

## 2020-04-24 RX ORDER — PANTOPRAZOLE SODIUM 20 MG/1
1 TABLET, DELAYED RELEASE ORAL
Qty: 30 | Refills: 0
Start: 2020-04-24 | End: 2020-05-23

## 2020-04-24 RX ORDER — FUROSEMIDE 40 MG
20 TABLET ORAL ONCE
Refills: 0 | Status: COMPLETED | OUTPATIENT
Start: 2020-04-24 | End: 2020-04-24

## 2020-04-24 RX ORDER — RIVAROXABAN 15 MG-20MG
1 KIT ORAL
Qty: 30 | Refills: 0
Start: 2020-04-24 | End: 2020-05-23

## 2020-04-24 RX ORDER — SODIUM CHLORIDE 0.65 %
1 AEROSOL, SPRAY (ML) NASAL
Qty: 1 | Refills: 0
Start: 2020-04-24 | End: 2020-05-23

## 2020-04-24 RX ORDER — ALBUTEROL 90 UG/1
2 AEROSOL, METERED ORAL
Qty: 1 | Refills: 0
Start: 2020-04-24 | End: 2020-05-23

## 2020-04-24 RX ADMIN — Medication 20 MILLIGRAM(S): at 09:43

## 2020-04-24 RX ADMIN — Medication 1 TABLET(S): at 09:44

## 2020-04-24 RX ADMIN — Medication 2 SPRAY(S): at 05:56

## 2020-04-24 RX ADMIN — Medication 112 MICROGRAM(S): at 05:56

## 2020-04-24 RX ADMIN — Medication 2 SPRAY(S): at 15:30

## 2020-04-24 RX ADMIN — PANTOPRAZOLE SODIUM 40 MILLIGRAM(S): 20 TABLET, DELAYED RELEASE ORAL at 05:56

## 2020-04-24 RX ADMIN — Medication 600 MILLIGRAM(S): at 17:01

## 2020-04-24 RX ADMIN — Medication 1 TABLET(S): at 17:01

## 2020-04-24 RX ADMIN — Medication 2 SPRAY(S): at 02:12

## 2020-04-24 RX ADMIN — Medication 2 SPRAY(S): at 15:29

## 2020-04-24 RX ADMIN — POLYETHYLENE GLYCOL 3350 17 GRAM(S): 17 POWDER, FOR SOLUTION ORAL at 12:11

## 2020-04-24 RX ADMIN — Medication 600 MILLIGRAM(S): at 05:55

## 2020-04-24 RX ADMIN — ENOXAPARIN SODIUM 40 MILLIGRAM(S): 100 INJECTION SUBCUTANEOUS at 12:11

## 2020-04-24 RX ADMIN — Medication 80 MILLIGRAM(S): at 20:51

## 2020-04-24 RX ADMIN — Medication 2 SPRAY(S): at 20:25

## 2020-04-24 RX ADMIN — Medication 2 SPRAY(S): at 12:10

## 2020-04-24 RX ADMIN — Medication 2 SPRAY(S): at 09:44

## 2020-04-24 RX ADMIN — Medication 1 TABLET(S): at 12:11

## 2020-04-24 NOTE — PROGRESS NOTE ADULT - SUBJECTIVE AND OBJECTIVE BOX
CC: dyspnea, dry cough     HPI/Subjective:     61 y/o Female with h/o thyroid CA s/p thyroidectomy  admitted on 4/16/20  with cough, fever, shortness of breath and chest pain progressively worsening since 4/7/2020.  Patient also c/o nausea and midepigastric discomfort. Patient denies leg pain/swelling. Patient has a pulse oximeter at home and noted O2 sats in the upper 80s over the last 24 hours. Patient was having persistent fever for the last 8-9 days at home. But her breathing got much worse in the last 24 hours, which prompted her ER Visit today. No recent travel. Her  is also sick at home.     4/17/20: Pt seen and examined bedside. Pt febrile this morning up to 100.7. Satting 91% on NRB. Pt was lying prone.   4/18 - patient seen and examined , + cough dry, + dyspnea , + lose bm , poor po intake, + dry throat and nose, denies cp, abdominal pain, POC discussed   4/19 - pt seen and examined, on NRB 98% , denies cp, + cough, + weakness, appetite slightly better, afebrile, POC discussed   4/20 - pt seen and examined, on NRB, does not feel better, + cough, + dyspnea, + weakness, denies cp, abdominal pain, POC discussed   4/21 - pt seen and examined, feels better, afebrile tolerates po intake, weakness improving, 95% on 8 L, 100 % on NRB , POC discussed   4/22: Patient seen and evaluated. Pt statesher breathing has improved and denies any stomach pain or discomfort. Pt overnight on 6L NC satting 92%, temperature 97.5F. This AM pt continued on 6L O2 via NC satting 99%, current temp. 97.8F.    4/23- pt seen and examined, feels better, cough  improved, afebrile, tolerates po intake, POC discussed  4/24 - pt seen and examined, feels better on 3 L sating at 98% at rest, + cough, dyspnea better, tolerates po intake, discharge planning for tomorrow discussed in length     REVIEW OF SYSTEMS: All other review of systems is negative unless indicated above.  T(C): 36.3 (04-24-20 @ 10:38), Max: 36.6 (04-23-20 @ 21:15)  T(F): 97.3 (04-24-20 @ 10:38), Max: 97.9 (04-23-20 @ 21:15)  HR: 102 (04-24-20 @ 10:38) (68 - 102)  BP: 117/74 (04-24-20 @ 10:38) (107/66 - 117/74)  RR: 18 (04-24-20 @ 10:38) (17 - 18)  SpO2: 100% (04-24-20 @ 10:38) (95% - 100%)  Wt(kg): --    PHYSICAL EXAM:  Constitutional: NAD, awake and alert, well-developed  HEENT: PERR, EOMI, Normal Hearing, MMM  Neck: Soft and supple  Respiratory: Breath sounds decreased at bases,  No wheezing, rales, + rhonchi  Cardiovascular: S1 and S2, regular rate and rhythm, no Murmurs, gallops or rubs  Gastrointestinal: Bowel Sounds present, soft, nontender, nondistended, no guarding, no rebound  Extremities: No peripheral edema  Neurological: A/O x 3, no focal deficits in my limited exam    LABS:  04-23    135  |  98  |  21  ----------------------------<  134<H>  4.5   |  32<H>  |  0.87    Ca    9.1      23 Apr 2020 08:42    TPro  7.2  /  Alb  2.6<L>  /  TBili  0.5  /  DBili  x   /  AST  20  /  ALT  34  /  AlkPhos  51  04-23                        13.1   9.94  )-----------( 447      ( 23 Apr 2020 08:42 )             40.5     LIVER FUNCTIONS - ( 23 Apr 2020 08:42 )  Alb: 2.6 g/dL / Pro: 7.2 gm/dL / ALK PHOS: 51 U/L / ALT: 34 U/L / AST: 20 U/L / GGT: x                   04-21    137  |  100  |  23  ----------------------------<  171<H>  4.6   |  33<H>  |  0.78    Ca    8.9      21 Apr 2020 08:47    TPro  7.1  /  Alb  2.5<L>  /  TBili  0.4  /  DBili  x   /  AST  18  /  ALT  33  /  AlkPhos  52  04-21    C-Reactive Protein, Serum: 0.80 mg/dL (04-23-20 @ 08:42)  C-Reactive Protein, Serum: 1.67 mg/dL (04-21-20 @ 08:47)  C-Reactive Protein, Serum: 6.67 mg/dL (04-19-20 @ 10:01)  C-Reactive Protein, Serum: 21.49 mg/dL (04-16-20 @ 02:35)    Procalcitonin, Serum (04.21.20 @ 08:47)    Procalcitonin, Serum: 0.23:      04-23-20 @ 08:42   -  0.14<H>  04-21-20 @ 08:47   -  0.23<H>  04-19-20 @ 10:01   -  0.66<H>  04-16-20 @ 02:35   -  6.89<H>    Ferritin, Serum: 615 ng/mL (04-19-20 @ 16:00)  Ferritin, Serum: 1090 ng/mL (04-16-20 @ 02:35)                     11.8   7.71  )-----------( 373      ( 21 Apr 2020 08:47 )             36.4       LIVER FUNCTIONS - ( 21 Apr 2020 08:47 )  Alb: 2.5 g/dL / Pro: 7.1 gm/dL / ALK PHOS: 52 U/L / ALT: 33 U/L / AST: 18 U/L / GGT: x           < from: 12 Lead ECG (04.16.20 @ 02:42) >    Ventricular Rate 85 BPM    Atrial Rate 85 BPM    P-R Interval 124 ms    QRS Duration 78 ms    Q-T Interval 360 ms    QTC Calculation(Bezet) 428 ms    P Axis 54 degrees    R Axis 67 degrees    T Axis 2 degrees    Diagnosis Line *** Poor data quality, interpretation may be adversely affected  Normal sinus rhythm  Nonspecific ST and T wave abnormality  Abnormal ECG    < end of copied text >  < from: Xray Chest 1 View- PORTABLE-Routine (04.23.20 @ 09:00) >    FINDINGS:   The lungs  show show stable scattered and diffuse multifocal ill-defined airspace consolidations    The heart and mediastinum arewithin normal limits.    Visualized osseous structures are intact.        IMPRESSION: Stable scattered and diffuse multifocal ill-defined airspace consolidations    < end of copied text >    < from: Xray Chest 1 View- PORTABLE-Routine (04.19.20 @ 10:22) >    Comparison is made to prior study of 3 days prior    A single frontal view of the chest demonstratespredominantly peripheral alveolar opacities which are mild to moderate and grossly unchanged from prior study.    < end of copied text >    < from: Xray Chest 1 View- PORTABLE-Routine (04.23.20 @ 09:00) >  FINDINGS:   The lungs  show show stable scattered and diffuse multifocal ill-defined airspace consolidations    The heart and mediastinum arewithin normal limits.    Visualized osseous structures are intact.        IMPRESSION: Stable scattered and diffuse multifocal ill-defined airspace consolidations      < end of copied text >          Culture - Blood (04.16.20 @ 02:35)    Specimen Source: .Blood Blood-Peripheral    Culture Results:   No growth to date.    < from: Xray Chest 1 View-PORTABLE IMMEDIATE (04.16.20 @ 03:17) >  FINDINGS:  Diffuse patchy airspace opacities throughout both lungs. Hemidiaphragms are sharp; no evidence of a pleural effusion. Cardiac silhouette size is exaggerated by AP technique. Mediastinal contours are within normal limits. The regional osseous structures are unremarkable.    IMPRESSION:    Diffuse patchy airspace opacities throughout both lungs.    < end of copied text >  MEDICATIONS  (STANDING):  ALBUTerol    90 MICROgram(s) HFA Inhaler 2 Puff(s) Inhalation every 6 hours  ascorbic acid IVPB 1500 milliGRAM(s) IV Intermittent daily  cefTRIAXone Injectable. 1000 milliGRAM(s) IV Push every 24 hours  doxycycline hyclate Capsule 100 milliGRAM(s) Oral every 12 hours  enoxaparin Injectable 40 milliGRAM(s) SubCutaneous daily  guaiFENesin  milliGRAM(s) Oral every 12 hours  hydroxychloroquine   Oral   hydroxychloroquine 400 milliGRAM(s) Oral every 24 hours  lactobacillus acidophilus 1 Tablet(s) Oral three times a day with meals  levothyroxine 112 MICROGram(s) Oral daily  methylPREDNISolone sodium succinate Injectable 40 milliGRAM(s) IV Push every 12 hours  pantoprazole    Tablet 40 milliGRAM(s) Oral before breakfast  sodium chloride 0.65% Nasal 2 Spray(s) Both Nostrils every 2 hours    MEDICATIONS  (PRN):  acetaminophen   Tablet .. 650 milliGRAM(s) Oral every 4 hours PRN Temp greater or equal to 38C (100.4F), Mild Pain (1 - 3)  ALBUTerol    90 MICROgram(s) HFA Inhaler 2 Puff(s) Inhalation every 4 hours PRN Shortness of Breath and/or Wheezing  hydrocodone/homatropine Syrup 5 milliLiter(s) Oral every 6 hours PRN Cough  ondansetron Injectable 4 milliGRAM(s) IV Push every 6 hours PRN Nausea and/or Vomiting

## 2020-04-24 NOTE — DISCHARGE NOTE PROVIDER - NSDCCPCAREPLAN_GEN_ALL_CORE_FT
PRINCIPAL DISCHARGE DIAGNOSIS  Diagnosis: Pneumonia due to COVID-19 virus  Assessment and Plan of Treatment: It has been determined that you no longer need hospitalization and can recover while remaining in self-quarantine at home for 14 days. You should follow the prevention steps below .  You should restrict activities outside your home except for getting medical care.  Do not go to work, school or public areas.  Avoid using public transportation.  Separate yourself from other people and animals in your home.  Cover your coughs and sneezes.  Clean your hands often. Avoid sharing personal household items. Clean all high touch surfaces. Monitor your symptoms, if you develop fever, worsening of dyspnea, chest pain, other concerns seak medical attention or call 911. Follow up with your primary care provider within 1 week upon discharge  repeat CXR in 2 weeks to establish resolution, wear oxygen until reevaluated by PCP         SECONDARY DISCHARGE DIAGNOSES  Diagnosis: Thyroid cancer  Assessment and Plan of Treatment: follow up with endocrinology and oncologist within 1 week    Diagnosis: DVT prophylaxis  Assessment and Plan of Treatment: take xarelto 10 mg with dinner for 30 days, monitor for signs of bleeding, return to ED if signs of bleeding - cough with blood, rectal bleeding, blood in the urine or other concerns go to ED PRINCIPAL DISCHARGE DIAGNOSIS  Diagnosis: Pneumonia due to COVID-19 virus  Assessment and Plan of Treatment: It has been determined that you no longer need hospitalization and can recover while remaining in self-quarantine at home for 14 days. You should follow the prevention steps below .  You should restrict activities outside your home except for getting medical care.  Do not go to work, school or public areas.  Avoid using public transportation.  Separate yourself from other people and animals in your home.  Cover your coughs and sneezes.  Clean your hands often. Avoid sharing personal household items. Clean all high touch surfaces. Monitor your symptoms, if you develop fever, worsening of dyspnea, chest pain, other concerns seak medical attention or call 911. Follow up with your primary care provider within 1 week upon discharge  repeat CXR in 2 weeks to establish resolution, wear oxygen until reevaluated by PCP   Do not take seroquel while taking cough medication hycodan         SECONDARY DISCHARGE DIAGNOSES  Diagnosis: Thyroid cancer  Assessment and Plan of Treatment: follow up with endocrinology and oncologist within 1 week    Diagnosis: DVT prophylaxis  Assessment and Plan of Treatment: take xarelto 10 mg with dinner for 30 days, monitor for signs of bleeding, return to ED if signs of bleeding - cough with blood, rectal bleeding, blood in the urine or other concerns go to ED  stop taking meloxicam while taking xarelto   continue to take pantoprazole or omeprazole for stomach protection PRINCIPAL DISCHARGE DIAGNOSIS  Diagnosis: Pneumonia due to COVID-19 virus  Assessment and Plan of Treatment: It has been determined that you no longer need hospitalization and can recover while remaining in self-quarantine at home for 14 days. You should follow the prevention steps below .  You should restrict activities outside your home except for getting medical care.  Do not go to work, school or public areas.  Avoid using public transportation.  Separate yourself from other people and animals in your home.  Cover your coughs and sneezes.  Clean your hands often. Avoid sharing personal household items. Clean all high touch surfaces. Monitor your symptoms, if you develop fever, worsening of dyspnea, chest pain, other concerns seak medical attention or call 911. Follow up with your primary care provider within 1 week upon discharge  repeat CXR in 2 weeks to establish resolution, wear oxygen until reevaluated by PCP   Do not take seroquel while taking cough medication hycodan         SECONDARY DISCHARGE DIAGNOSES  Diagnosis: Prediabetes  Assessment and Plan of Treatment: A1C 6.4 , repeat A1C in 4 weeks, advised to follow low carbs diet, avoid eating to much sweats    Diagnosis: Thyroid cancer  Assessment and Plan of Treatment: follow up with endocrinology and oncologist within 1 week    Diagnosis: DVT prophylaxis  Assessment and Plan of Treatment: take xarelto 10 mg with dinner for 30 days, monitor for signs of bleeding, return to ED if signs of bleeding - cough with blood, rectal bleeding, blood in the urine or other concerns go to ED  stop taking meloxicam while taking xarelto   continue to take pantoprazole or omeprazole for stomach protection

## 2020-04-24 NOTE — PROGRESS NOTE ADULT - NSREFPHYEXREFTO_GEN_ALL_CORE
ED Physical Exam

## 2020-04-24 NOTE — DISCHARGE NOTE PROVIDER - CARE PROVIDER_API CALL
Lesia Bonds (DO)  Internal Medicine  [address] [place] [address]  Phone: [phone]  Fax: [phone]  Follow Up Time: 1-3 days    Willem Carlos)  EndocrinologyMetabDiabetes; Internal Medicine  [address] [place] [address]  Phone: [phone]  Fax: [phone]  Follow Up Time:     Osbaldo Child)  Hematology; Internal Medicine; Medical Oncology  [address] [place] [address], [address] [place] [address]  Phone: [phone]  Fax: [phone]  Follow Up Time:     Susana Spaulding)  Internal Medicine  [address] [facility], [address]  [place] [address]  Phone: [phone]  Fax: [phone]  Follow Up Time: 1 week

## 2020-04-24 NOTE — DISCHARGE NOTE NURSING/CASE MANAGEMENT/SOCIAL WORK - PATIENT PORTAL LINK FT
You can access the FollowMyHealth Patient Portal offered by [facility] by registering at the following website: [email or web address]. By joining [email or web address]’s FollowMyHealth portal, you will also be able to view your health information using other applications (apps) compatible with our system.

## 2020-04-24 NOTE — DISCHARGE NOTE PROVIDER - NSDCMRMEDTOKEN_GEN_ALL_CORE_FT
guaiFENesin 600 mg oral tablet, extended release: 1 tab(s) orally every 12 hours  hydrocodone-homatropine 5 mg-1.5 mg/5 mL oral syrup: 5 milliliter(s) orally once a day (at bedtime), As Needed -for cough MDD:5  levothyroxine 112 mcg (0.112 mg) oral tablet: 1 tab(s) orally once a day  pantoprazole 40 mg oral delayed release tablet: 1 tab(s) orally once a day (before a meal)  pravastatin 80 mg oral tablet: 1 tab(s) orally once a day (at bedtime)  ProAir HFA 90 mcg/inh inhalation aerosol: 2 puff(s) inhaled every 6 hours, As Needed  -for bronchospasm   Simply Saline 0.9% nasal spray: 1 spray(s) intranasally every 2 hours, As Needed -for congestion   Xarelto 10 mg oral tablet: 1 tab(s) orally once a day (after a meal) with dinner

## 2020-04-24 NOTE — DISCHARGE NOTE PROVIDER - CARE PROVIDERS DIRECT ADDRESSES
,DirectAddress_Unknown,[email or web address],DirectAddress_Unknown,[email or web address]

## 2020-04-24 NOTE — DISCHARGE NOTE PROVIDER - HOSPITAL COURSE
CC: dyspnea, dry cough         HPI/Subjective:       59 y/o Female with h/o thyroid CA s/p thyroidectomy  admitted on 4/16/20  with cough, fever, shortness of breath and chest pain progressively worsening since 4/7/2020.  Patient also c/o nausea and midepigastric discomfort. Patient denies leg pain/swelling. Patient has a pulse oximeter at home and noted O2 sats in the upper 80s over the last 24 hours. Patient was having persistent fever for the last 8-9 days at home. But her breathing got much worse in the last 24 hours, which prompted her ER Visit today. No recent travel. Her  is also sick at home.         4/17/20: Pt seen and examined bedside. Pt febrile this morning up to 100.7. Satting 91% on NRB. Pt was lying prone.     4/18 - patient seen and examined , + cough dry, + dyspnea , + lose bm , poor po intake, + dry throat and nose, denies cp, abdominal pain, POC discussed     4/19 - pt seen and examined, on NRB 98% , denies cp, + cough, + weakness, appetite slightly better, afebrile, POC discussed     4/20 - pt seen and examined, on NRB, does not feel better, + cough, + dyspnea, + weakness, denies cp, abdominal pain, POC discussed     4/21 - pt seen and examined, feels better, afebrile tolerates po intake, weakness improving, 95% on 8 L, 100 % on NRB , POC discussed     4/22: Patient seen and evaluated. Pt statesher breathing has improved and denies any stomach pain or discomfort. Pt overnight on 6L NC satting 92%, temperature 97.5F. This AM pt continued on 6L O2 via NC satting 99%, current temp. 97.8F.      4/23- pt seen and examined, feels better, cough  improved, afebrile, tolerates po intake, POC discussed    4/24 - pt seen and examined, feels better on 3 L sating at 98% at rest, + cough, dyspnea better, tolerates po intake, discharge planning for tomorrow discussed in length         REVIEW OF SYSTEMS: All other review of systems is negative unless indicated above.    T(C): 36.3 (04-24-20 @ 10:38), Max: 36.6 (04-23-20 @ 21:15)    T(F): 97.3 (04-24-20 @ 10:38), Max: 97.9 (04-23-20 @ 21:15)    HR: 102 (04-24-20 @ 10:38) (68 - 102)    BP: 117/74 (04-24-20 @ 10:38) (107/66 - 117/74)    RR: 18 (04-24-20 @ 10:38) (17 - 18)    SpO2: 100% (04-24-20 @ 10:38) (95% - 100%)    Wt(kg): --    PHYSICAL EXAM:    Constitutional: NAD, awake and alert, well-developed    HEENT: PERR, EOMI, Normal Hearing, MMM    Neck: Soft and supple    Respiratory: Breath sounds decreased at bases,  No wheezing, rales, + rhonchi    Cardiovascular: S1 and S2, regular rate and rhythm, no Murmurs, gallops or rubs    Gastrointestinal: Bowel Sounds present, soft, nontender, nondistended, no guarding, no rebound    Extremities: No peripheral edema    Neurological: A/O x 3, no focal deficits in my limited exam    * Acute Hypoxic Respiratory Failure due to COVID-19 viral pneumonia with suspected superimposed CAP , improving     - isolation precautions,    - O2 supplementation on NRB 96 --> 100% , down -titrate  if able to 95% on 8 L --> 3 L 98 % , c/w with O2 supplementation     - baseline procalcitonin 6--> 0.6--> 0.23 ,  CRP 21--> 6 --> 1.6 --> 0.8, ferritin 1000,  , d-dimers 165 , trend every 48-72 h     - s/p hydroxychloroquine as per protocol 5 days course ( risks and side effects discussed with patient )     - EKG reviewed on admission and QTc < 500 msec. repeat EKG 4/24 - wnl     - start /continue statin - pravastatin 80 hs  , monitor LFTs  - wnl     - s/p IV steroids 40 bid with ppi     - s/p 5 days of IV ceftriaxone and doxycycline , trial of IV vit C 1.5 gm bid  x 3 days     - 4/19 - lasix 20 x 1 dose, 4/20 - lasix 20 x 1 dose  4/24 - lasix 20 x1     - --> 490--> repeat in am    - antipyretics, mucolytics, beta-agonist inhalers, incentive spirometry,  nasal saline irrigation, supportive care.      - 4/20 s/p 3 days  anakinra 100 q6h as per protocol      - CXR 4/19 - no change , repeat CXR stable     * Hypothyroidism - Levothyroxine  112, free T4 slightly elevated , o/p monitoring     * Hyponatremia hypovolemic , resolved - encourage po liquids, add supplements    * Thyroid cancer - o/p oncology and endocrinology consult     DVT proph - c/w lovenox , VTE risk score 4 ( 2 - thrombophilia , 2 for cancer ) , will need to complete 30 days of xarelto 10 with dinner     Disposition - medically optimized to be discharged home with close follow up with PCP, endo and oncology within 1 week     return to ED if fever, abdominal pain, nausea, vomiting, chest pain, dyspnea    Discharge plan discussed with patient, RN    Patient advised to follow up with PCP within 3-7 days    time spend 40 min    Discharge note faxed to PCP with my contact information to call me back     PCP Myranda Bonds [phone] left the message to call me back CC: dyspnea, dry cough         HPI/Subjective:       59 y/o Female with h/o thyroid CA s/p thyroidectomy  admitted on 4/16/20  with cough, fever, shortness of breath and chest pain progressively worsening since 4/7/2020.  Patient also c/o nausea and midepigastric discomfort. Patient denies leg pain/swelling. Patient has a pulse oximeter at home and noted O2 sats in the upper 80s over the last 24 hours. Patient was having persistent fever for the last 8-9 days at home. But her breathing got much worse in the last 24 hours, which prompted her ER Visit today. No recent travel. Her  is also sick at home.         4/17/20: Pt seen and examined bedside. Pt febrile this morning up to 100.7. Satting 91% on NRB. Pt was lying prone.     4/18 - patient seen and examined , + cough dry, + dyspnea , + lose bm , poor po intake, + dry throat and nose, denies cp, abdominal pain, POC discussed     4/19 - pt seen and examined, on NRB 98% , denies cp, + cough, + weakness, appetite slightly better, afebrile, POC discussed     4/20 - pt seen and examined, on NRB, does not feel better, + cough, + dyspnea, + weakness, denies cp, abdominal pain, POC discussed     4/21 - pt seen and examined, feels better, afebrile tolerates po intake, weakness improving, 95% on 8 L, 100 % on NRB , POC discussed     4/22: Patient seen and evaluated. Pt statesher breathing has improved and denies any stomach pain or discomfort. Pt overnight on 6L NC satting 92%, temperature 97.5F. This AM pt continued on 6L O2 via NC satting 99%, current temp. 97.8F.      4/23- pt seen and examined, feels better, cough  improved, afebrile, tolerates po intake, POC discussed    4/24 - pt seen and examined, feels better on 3 L sating at 98% at rest, + cough, dyspnea better, tolerates po intake, discharge planning for tomorrow discussed in length         REVIEW OF SYSTEMS: All other review of systems is negative unless indicated above.    T(C): 36.3 (04-24-20 @ 10:38), Max: 36.6 (04-23-20 @ 21:15)    T(F): 97.3 (04-24-20 @ 10:38), Max: 97.9 (04-23-20 @ 21:15)    HR: 102 (04-24-20 @ 10:38) (68 - 102)    BP: 117/74 (04-24-20 @ 10:38) (107/66 - 117/74)    RR: 18 (04-24-20 @ 10:38) (17 - 18)    SpO2: 100% (04-24-20 @ 10:38) (95% - 100%)    Wt(kg): --    PHYSICAL EXAM:    Constitutional: NAD, awake and alert, well-developed    HEENT: PERR, EOMI, Normal Hearing, MMM    Neck: Soft and supple    Respiratory: Breath sounds decreased at bases,  No wheezing, rales, + rhonchi    Cardiovascular: S1 and S2, regular rate and rhythm, no Murmurs, gallops or rubs    Gastrointestinal: Bowel Sounds present, soft, nontender, nondistended, no guarding, no rebound    Extremities: No peripheral edema    Neurological: A/O x 3, no focal deficits in my limited exam    * Acute Hypoxic Respiratory Failure due to COVID-19 viral pneumonia with suspected superimposed CAP , improving     - isolation precautions,    - O2 supplementation on NRB 96 --> 100% , down -titrate  if able to 95% on 8 L --> 3 L 98 % , c/w with O2 supplementation     - baseline procalcitonin 6--> 0.6--> 0.23 ,  CRP 21--> 6 --> 1.6 --> 0.8, ferritin 1000,  , d-dimers 165 , trend every 48-72 h     - s/p hydroxychloroquine as per protocol 5 days course ( risks and side effects discussed with patient )     - EKG reviewed on admission and QTc < 500 msec. repeat EKG 4/24 - wnl     - start /continue statin - pravastatin 80 hs  , monitor LFTs  - wnl     - s/p IV steroids 40 bid with ppi     - s/p 5 days of IV ceftriaxone and doxycycline , trial of IV vit C 1.5 gm bid  x 3 days     - 4/19 - lasix 20 x 1 dose, 4/20 - lasix 20 x 1 dose  4/24 - lasix 20 x1     - --> 490--> repeat in am    - antipyretics, mucolytics, beta-agonist inhalers, incentive spirometry,  nasal saline irrigation, supportive care.      - 4/20 s/p 3 days  anakinra 100 q6h as per protocol      - CXR 4/19 - no change , repeat CXR stable     * Hypothyroidism - Levothyroxine  112, free T4 slightly elevated , o/p monitoring     * Hyponatremia hypovolemic , resolved - encourage po liquids, add supplements    * Thyroid cancer - o/p oncology and endocrinology consult     * Prediabetes A1C 6.4 - s/p IV steroids, repeat A1C in 4 weeks, low carb diet advised    DVT proph - c/w lovenox , VTE risk score 4 ( 2 - thrombophilia , 2 for cancer ) , will need to complete 30 days of xarelto 10 with dinner     Disposition - medically optimized to be discharged home with close follow up with PCP, endo and oncology within 1 week     return to ED if fever, abdominal pain, nausea, vomiting, chest pain, dyspnea    Discharge plan discussed with patient, RN    Patient advised to follow up with PCP within 3-7 days    time spend 40 min    Discharge note faxed to PCP with my contact information to call me back     PCP Myranda Bonds [phone] left the message to call me back

## 2020-04-24 NOTE — DISCHARGE NOTE PROVIDER - PROVIDER TOKENS
PROVIDER:[TOKEN:[[id number]],FOLLOWUP:[1-3 days]],PROVIDER:[TOKEN:[[id number]]],PROVIDER:[TOKEN:[[id number]]],PROVIDER:[TOKEN:[[id number]],FOLLOWUP:[1 week]]

## 2020-04-24 NOTE — PROGRESS NOTE ADULT - ASSESSMENT
59 y/o Female with h/o thyroid CA s/p thyroidectomy  admitted on 4/16/20  with cough, fever, shortness of breath and chest pain progressively worsening since 4/7/2020.  Patient also c/o nausea and midepigastric discomfort. Patient denies leg pain/swelling. Patient has a pulse oximeter at home and noted O2 sats in the upper 80s over the last 24 hours. Patient was having persistent fever for the last 8-9 days at home. But her breathing got much worse in the last 24 hours, which prompted her ER Visit today. No recent travel. Her  is also sick at home.     A/P:  * Acute Hypoxic Respiratory Failure due to COVID-19 viral pneumonia with suspected superimposed CAP , improving   - isolation precautions,  - O2 supplementation on NRB 96 --> 100% , down -titrate  if able to 95% on 8 L --> 3 L 98 %   - baseline procalcitonin 6--> 0.6--> 0.23 ,  CRP 21--> 6 --> 1.6 --> 0.8, ferritin 1000,  , d-dimers 165 , trend every 48-72 h   - hydroxychloroquine as per protocol 5 days course ( risks and side effects discussed with patient )   - EKG reviewed on admission and QTc < 500 msec. No need for further cardiac monitoring.  - start /continue statin - pravastatin 40 hs  , monitor LFTs   - IV steroids 40 bid with ppi  day #2   - s/p 5 days of IV ceftriaxone and doxycycline , trial of IV vit C 1.5 gm bid  x 3 days   - 4/19 - lasix 20 x 1 dose, 4/20 - lasix 20 x 1 dose  4/24 - lasix 20 x1   - --> 490--> repeat in am  - antipyretics, mucolytics, beta-agonist inhalers, incentive spirometry,  nasal saline irrigation, supportive care.    - DVT proph - Lovenox 40 mg SQ daily   -  ID consult consult - for need of  initiation of anakinra/tocilizumab/salimumab   - check O2 sat on 6 L - < 88 %   - 4/20 s/p 3 days  anakinra 100 q6h as per protocol    - CXR 4/19 - no change   - CXR in am 4/23/20  * Hypothyroidism  -Levothyroxine  112, free T4 slightly elevated , o/p monitoring   * Hyponatremia hypovolemic  - encourage po liquids, add supplements      Lovenox for DVT ppx.   Advanced directives - Goals of Care discussion had with patient : FULL CODE, 17 minutes spend     Patient stable for discharge from COVID-19 related infection but still requiring to use O2 supplementation to support adequate oxygention.   Patient has acute diagnosis of COVID- 19  and requires home O2 to increase hospital capacity and mitigate risk.   Dispo - s/p  anakinra, supportive care , CXR stable , repeat labs in am , d/c planning for noe

## 2020-04-24 NOTE — PROGRESS NOTE ADULT - REASON FOR ADMISSION
complain of shortness of breath

## 2020-04-25 VITALS — HEART RATE: 88 BPM | RESPIRATION RATE: 20 BRPM | OXYGEN SATURATION: 97 % | TEMPERATURE: 98 F

## 2020-04-25 LAB
ANION GAP SERPL CALC-SCNC: 4 MMOL/L — LOW (ref 5–17)
BASOPHILS # BLD AUTO: 0.01 K/UL — SIGNIFICANT CHANGE UP (ref 0–0.2)
BASOPHILS NFR BLD AUTO: 0.2 % — SIGNIFICANT CHANGE UP (ref 0–2)
BUN SERPL-MCNC: 25 MG/DL — HIGH (ref 7–23)
CALCIUM SERPL-MCNC: 9.1 MG/DL — SIGNIFICANT CHANGE UP (ref 8.5–10.1)
CHLORIDE SERPL-SCNC: 97 MMOL/L — SIGNIFICANT CHANGE UP (ref 96–108)
CO2 SERPL-SCNC: 37 MMOL/L — HIGH (ref 22–31)
CREAT SERPL-MCNC: 0.8 MG/DL — SIGNIFICANT CHANGE UP (ref 0.5–1.3)
EOSINOPHIL # BLD AUTO: 0.15 K/UL — SIGNIFICANT CHANGE UP (ref 0–0.5)
EOSINOPHIL NFR BLD AUTO: 2.7 % — SIGNIFICANT CHANGE UP (ref 0–6)
GLUCOSE SERPL-MCNC: 105 MG/DL — HIGH (ref 70–99)
HCT VFR BLD CALC: 36.5 % — SIGNIFICANT CHANGE UP (ref 34.5–45)
HGB BLD-MCNC: 11.9 G/DL — SIGNIFICANT CHANGE UP (ref 11.5–15.5)
IMM GRANULOCYTES NFR BLD AUTO: 3.6 % — HIGH (ref 0–1.5)
LYMPHOCYTES # BLD AUTO: 1.31 K/UL — SIGNIFICANT CHANGE UP (ref 1–3.3)
LYMPHOCYTES # BLD AUTO: 23.8 % — SIGNIFICANT CHANGE UP (ref 13–44)
MCHC RBC-ENTMCNC: 29.2 PG — SIGNIFICANT CHANGE UP (ref 27–34)
MCHC RBC-ENTMCNC: 32.6 GM/DL — SIGNIFICANT CHANGE UP (ref 32–36)
MCV RBC AUTO: 89.7 FL — SIGNIFICANT CHANGE UP (ref 80–100)
MONOCYTES # BLD AUTO: 0.6 K/UL — SIGNIFICANT CHANGE UP (ref 0–0.9)
MONOCYTES NFR BLD AUTO: 10.9 % — SIGNIFICANT CHANGE UP (ref 2–14)
NEUTROPHILS # BLD AUTO: 3.24 K/UL — SIGNIFICANT CHANGE UP (ref 1.8–7.4)
NEUTROPHILS NFR BLD AUTO: 58.8 % — SIGNIFICANT CHANGE UP (ref 43–77)
NT-PROBNP SERPL-SCNC: 52 PG/ML — SIGNIFICANT CHANGE UP (ref 0–125)
PLATELET # BLD AUTO: 369 K/UL — SIGNIFICANT CHANGE UP (ref 150–400)
POTASSIUM SERPL-MCNC: 4.7 MMOL/L — SIGNIFICANT CHANGE UP (ref 3.5–5.3)
POTASSIUM SERPL-SCNC: 4.7 MMOL/L — SIGNIFICANT CHANGE UP (ref 3.5–5.3)
RBC # BLD: 4.07 M/UL — SIGNIFICANT CHANGE UP (ref 3.8–5.2)
RBC # FLD: 13.2 % — SIGNIFICANT CHANGE UP (ref 10.3–14.5)
SODIUM SERPL-SCNC: 138 MMOL/L — SIGNIFICANT CHANGE UP (ref 135–145)
WBC # BLD: 5.51 K/UL — SIGNIFICANT CHANGE UP (ref 3.8–10.5)
WBC # FLD AUTO: 5.51 K/UL — SIGNIFICANT CHANGE UP (ref 3.8–10.5)

## 2020-04-25 PROCEDURE — 99239 HOSP IP/OBS DSCHRG MGMT >30: CPT

## 2020-04-25 RX ADMIN — Medication 2 SPRAY(S): at 05:11

## 2020-04-25 RX ADMIN — Medication 2 SPRAY(S): at 07:45

## 2020-04-25 RX ADMIN — ENOXAPARIN SODIUM 40 MILLIGRAM(S): 100 INJECTION SUBCUTANEOUS at 11:47

## 2020-04-25 RX ADMIN — Medication 600 MILLIGRAM(S): at 05:12

## 2020-04-25 RX ADMIN — Medication 2 SPRAY(S): at 10:00

## 2020-04-25 RX ADMIN — POLYETHYLENE GLYCOL 3350 17 GRAM(S): 17 POWDER, FOR SOLUTION ORAL at 11:48

## 2020-04-25 RX ADMIN — Medication 112 MICROGRAM(S): at 05:12

## 2020-04-25 RX ADMIN — Medication 1 TABLET(S): at 11:47

## 2020-04-25 RX ADMIN — Medication 650 MILLIGRAM(S): at 06:23

## 2020-04-25 RX ADMIN — Medication 2 SPRAY(S): at 16:30

## 2020-04-25 RX ADMIN — Medication 650 MILLIGRAM(S): at 12:03

## 2020-04-25 RX ADMIN — Medication 600 MILLIGRAM(S): at 16:30

## 2020-04-25 RX ADMIN — PANTOPRAZOLE SODIUM 40 MILLIGRAM(S): 20 TABLET, DELAYED RELEASE ORAL at 05:12

## 2020-04-25 RX ADMIN — Medication 1 TABLET(S): at 16:30

## 2020-04-25 RX ADMIN — ALBUTEROL 2 PUFF(S): 90 AEROSOL, METERED ORAL at 14:10

## 2020-04-25 RX ADMIN — Medication 1 TABLET(S): at 09:04

## 2020-04-25 RX ADMIN — Medication 2 SPRAY(S): at 14:00

## 2020-04-25 RX ADMIN — Medication 2 SPRAY(S): at 11:46

## 2020-04-28 DIAGNOSIS — J12.89 OTHER VIRAL PNEUMONIA: ICD-10-CM

## 2020-04-28 DIAGNOSIS — E87.1 HYPO-OSMOLALITY AND HYPONATREMIA: ICD-10-CM

## 2020-04-28 DIAGNOSIS — J96.01 ACUTE RESPIRATORY FAILURE WITH HYPOXIA: ICD-10-CM

## 2020-04-28 DIAGNOSIS — U07.1 COVID-19: ICD-10-CM

## 2020-04-28 DIAGNOSIS — J15.9 UNSPECIFIED BACTERIAL PNEUMONIA: ICD-10-CM

## 2020-04-28 DIAGNOSIS — C73 MALIGNANT NEOPLASM OF THYROID GLAND: ICD-10-CM

## 2020-04-28 DIAGNOSIS — R73.03 PREDIABETES: ICD-10-CM

## 2020-04-28 DIAGNOSIS — Z99.81 DEPENDENCE ON SUPPLEMENTAL OXYGEN: ICD-10-CM

## 2020-04-28 DIAGNOSIS — E89.0 POSTPROCEDURAL HYPOTHYROIDISM: ICD-10-CM

## 2020-04-28 DIAGNOSIS — E86.1 HYPOVOLEMIA: ICD-10-CM

## 2020-04-28 PROBLEM — Z00.00 ENCOUNTER FOR PREVENTIVE HEALTH EXAMINATION: Status: ACTIVE | Noted: 2020-04-28

## 2020-05-01 ENCOUNTER — OUTPATIENT (OUTPATIENT)
Dept: OUTPATIENT SERVICES | Facility: HOSPITAL | Age: 60
LOS: 1 days | End: 2020-05-01
Payer: MEDICAID

## 2020-05-01 PROCEDURE — G9001: CPT

## 2020-05-14 ENCOUNTER — EMERGENCY (EMERGENCY)
Facility: HOSPITAL | Age: 60
LOS: 0 days | Discharge: HOME | End: 2020-05-14
Attending: EMERGENCY MEDICINE
Payer: MEDICAID

## 2020-05-14 VITALS
TEMPERATURE: 98 F | HEART RATE: 89 BPM | OXYGEN SATURATION: 97 % | DIASTOLIC BLOOD PRESSURE: 83 MMHG | RESPIRATION RATE: 17 BRPM | SYSTOLIC BLOOD PRESSURE: 122 MMHG

## 2020-05-14 VITALS — HEIGHT: 66 IN | WEIGHT: 164.91 LBS

## 2020-05-14 DIAGNOSIS — M54.6 PAIN IN THORACIC SPINE: ICD-10-CM

## 2020-05-14 DIAGNOSIS — K21.9 GASTRO-ESOPHAGEAL REFLUX DISEASE WITHOUT ESOPHAGITIS: ICD-10-CM

## 2020-05-14 DIAGNOSIS — R06.02 SHORTNESS OF BREATH: ICD-10-CM

## 2020-05-14 DIAGNOSIS — Z99.81 DEPENDENCE ON SUPPLEMENTAL OXYGEN: ICD-10-CM

## 2020-05-14 DIAGNOSIS — E78.5 HYPERLIPIDEMIA, UNSPECIFIED: ICD-10-CM

## 2020-05-14 DIAGNOSIS — U07.1 COVID-19: ICD-10-CM

## 2020-05-14 DIAGNOSIS — R07.9 CHEST PAIN, UNSPECIFIED: ICD-10-CM

## 2020-05-14 DIAGNOSIS — Z79.01 LONG TERM (CURRENT) USE OF ANTICOAGULANTS: ICD-10-CM

## 2020-05-14 LAB
ALBUMIN SERPL ELPH-MCNC: 3.2 G/DL — LOW (ref 3.3–5)
ALP SERPL-CCNC: 44 U/L — SIGNIFICANT CHANGE UP (ref 40–120)
ALT FLD-CCNC: 30 U/L — SIGNIFICANT CHANGE UP (ref 12–78)
ANION GAP SERPL CALC-SCNC: 7 MMOL/L — SIGNIFICANT CHANGE UP (ref 5–17)
AST SERPL-CCNC: 21 U/L — SIGNIFICANT CHANGE UP (ref 15–37)
BILIRUB SERPL-MCNC: 0.3 MG/DL — SIGNIFICANT CHANGE UP (ref 0.2–1.2)
BUN SERPL-MCNC: 17 MG/DL — SIGNIFICANT CHANGE UP (ref 7–23)
CALCIUM SERPL-MCNC: 8.8 MG/DL — SIGNIFICANT CHANGE UP (ref 8.5–10.1)
CHLORIDE SERPL-SCNC: 108 MMOL/L — SIGNIFICANT CHANGE UP (ref 96–108)
CO2 SERPL-SCNC: 26 MMOL/L — SIGNIFICANT CHANGE UP (ref 22–31)
CREAT SERPL-MCNC: 0.86 MG/DL — SIGNIFICANT CHANGE UP (ref 0.5–1.3)
GLUCOSE SERPL-MCNC: 127 MG/DL — HIGH (ref 70–99)
HCT VFR BLD CALC: 37.5 % — SIGNIFICANT CHANGE UP (ref 34.5–45)
HGB BLD-MCNC: 12.3 G/DL — SIGNIFICANT CHANGE UP (ref 11.5–15.5)
MCHC RBC-ENTMCNC: 29.4 PG — SIGNIFICANT CHANGE UP (ref 27–34)
MCHC RBC-ENTMCNC: 32.8 GM/DL — SIGNIFICANT CHANGE UP (ref 32–36)
MCV RBC AUTO: 89.5 FL — SIGNIFICANT CHANGE UP (ref 80–100)
NT-PROBNP SERPL-SCNC: 122 PG/ML — SIGNIFICANT CHANGE UP (ref 0–125)
PLATELET # BLD AUTO: 238 K/UL — SIGNIFICANT CHANGE UP (ref 150–400)
POTASSIUM SERPL-MCNC: 4.1 MMOL/L — SIGNIFICANT CHANGE UP (ref 3.5–5.3)
POTASSIUM SERPL-SCNC: 4.1 MMOL/L — SIGNIFICANT CHANGE UP (ref 3.5–5.3)
PROT SERPL-MCNC: 7.4 GM/DL — SIGNIFICANT CHANGE UP (ref 6–8.3)
RBC # BLD: 4.19 M/UL — SIGNIFICANT CHANGE UP (ref 3.8–5.2)
RBC # FLD: 13.6 % — SIGNIFICANT CHANGE UP (ref 10.3–14.5)
SODIUM SERPL-SCNC: 141 MMOL/L — SIGNIFICANT CHANGE UP (ref 135–145)
TROPONIN I SERPL-MCNC: <0.015 NG/ML — SIGNIFICANT CHANGE UP (ref 0.01–0.04)
WBC # BLD: 6.06 K/UL — SIGNIFICANT CHANGE UP (ref 3.8–10.5)
WBC # FLD AUTO: 6.06 K/UL — SIGNIFICANT CHANGE UP (ref 3.8–10.5)

## 2020-05-14 PROCEDURE — 36415 COLL VENOUS BLD VENIPUNCTURE: CPT

## 2020-05-14 PROCEDURE — 83880 ASSAY OF NATRIURETIC PEPTIDE: CPT

## 2020-05-14 PROCEDURE — 93010 ELECTROCARDIOGRAM REPORT: CPT

## 2020-05-14 PROCEDURE — 71275 CT ANGIOGRAPHY CHEST: CPT

## 2020-05-14 PROCEDURE — 99284 EMERGENCY DEPT VISIT MOD MDM: CPT

## 2020-05-14 PROCEDURE — 71275 CT ANGIOGRAPHY CHEST: CPT | Mod: 26

## 2020-05-14 PROCEDURE — 99284 EMERGENCY DEPT VISIT MOD MDM: CPT | Mod: 25

## 2020-05-14 PROCEDURE — 84484 ASSAY OF TROPONIN QUANT: CPT

## 2020-05-14 PROCEDURE — 80053 COMPREHEN METABOLIC PANEL: CPT

## 2020-05-14 PROCEDURE — 93005 ELECTROCARDIOGRAM TRACING: CPT

## 2020-05-14 PROCEDURE — 85027 COMPLETE CBC AUTOMATED: CPT

## 2020-05-14 NOTE — ED PROVIDER NOTE - NSFOLLOWUPINSTRUCTIONS_ED_ALL_ED_FT
Follow up with your primary doctor tomorrow  Heat therapy  Icy hot or [place] Balm to area  Ibuprofen or tylenol as needed for pain  Any worsening symptoms return to ER.

## 2020-05-14 NOTE — ED PROVIDER NOTE - OBJECTIVE STATEMENT
59 yo female with a PMH of hld, gerd, +covid in april (admitted for 10 days and d/c home on 4/25/20 on O2 and xarelto) presents with L sided cp and sob x few days. Pt states she feel it wrapping around her from her back to the L chest. Had a rash that improved with benadryl. Currently on O2 3L at home continuous. Denies leg swelling, abd pain, n/v/d

## 2020-05-14 NOTE — ED ADULT NURSE REASSESSMENT NOTE - NS ED NURSE REASSESS COMMENT FT1
Assumed pt care from previous RN, Itzel Weston.  Pt alert and oriented, stretcher side rails up, and in lowest locked position.  Safety maintained.

## 2020-05-14 NOTE — ED PROVIDER NOTE - PATIENT PORTAL LINK FT
You can access the FollowMyHealth Patient Portal offered by [facility] by registering at the following website: [email or web address]. By joining [employer]’s FollowMyHealth portal, you will also be able to view your health information using other applications (apps) compatible with our system.

## 2020-05-14 NOTE — ED PROVIDER NOTE - CLINICAL SUMMARY MEDICAL DECISION MAKING FREE TEXT BOX
61 yo female presents with slight cough, cp, sob s/p dx with covid in april. Pt currently on O2 at home. Labs, EKG, CTA chest, reeval. -Junito Harrison PA-C

## 2020-05-14 NOTE — ED ADULT TRIAGE NOTE - CHIEF COMPLAINT QUOTE
pt c/o left upper back radiate to left chest, worse with the movement, deep breath and cough for 1 week.  +covid tested positive on 4/15.

## 2020-05-14 NOTE — ED PROVIDER NOTE - ATTENDING CONTRIBUTION TO CARE
I, Jane Joseph MD,  performed the initial face to face bedside interview with this patient regarding history of present illness, review of symptoms and relevant past medical, social and family history.  I completed an independent physical examination.  I was the initial provider who evaluated this patient. I have signed out the follow up of any pending tests (i.e. labs, radiological studies) to the ACP.  I have communicated the patient’s plan of care and disposition with the ACP.  The history, relevant review of systems, past medical and surgical history, medical decision making, and physical examination was documented by the scribe in my presence and I attest to the accuracy of the documentation.

## 2020-05-14 NOTE — ED ADULT NURSE NOTE - OBJECTIVE STATEMENT
pt. c/o left upper back/shoulder pain that radiates to left side and left breast. pt. states she was diagnosed with covid and has had CP in pass from coughing but states "this pain feels different." pt. endorses feel general weakness.

## 2020-05-14 NOTE — ED PROVIDER NOTE - PROGRESS NOTE DETAILS
Shara Cifuentes for ED attending Dr. Joseph: 61 y/o f with PMHx of hld, gerd, +covid in April (admitted for 10 days and d/c home on 4/25/20 on O2 and Xarelto) presenting to the ED c/o left sided neck, shoulder, posterior thoracic pain for last few days. Pain worse with twisting, ranging of neck, moving. Denies leg swelling, abd pain, n/v/d. Physical exam is benign. Labs and imaging benign. Pt reassured, will DC home with PMD f/u.

## 2020-05-15 ENCOUNTER — TRANSCRIPTION ENCOUNTER (OUTPATIENT)
Age: 60
End: 2020-05-15

## 2020-05-16 ENCOUNTER — TRANSCRIPTION ENCOUNTER (OUTPATIENT)
Age: 60
End: 2020-05-16

## 2020-05-18 PROBLEM — E78.5 HYPERLIPIDEMIA, UNSPECIFIED: Chronic | Status: ACTIVE | Noted: 2020-05-14

## 2020-05-19 ENCOUNTER — TRANSCRIPTION ENCOUNTER (OUTPATIENT)
Age: 60
End: 2020-05-19

## 2020-05-19 DIAGNOSIS — Z71.89 OTHER SPECIFIED COUNSELING: ICD-10-CM

## 2020-05-19 PROBLEM — Z78.9 OTHER SPECIFIED HEALTH STATUS: Chronic | Status: INACTIVE | Noted: 2020-04-16 | Resolved: 2020-05-14

## 2020-05-23 ENCOUNTER — TRANSCRIPTION ENCOUNTER (OUTPATIENT)
Age: 60
End: 2020-05-23

## 2020-05-29 ENCOUNTER — APPOINTMENT (OUTPATIENT)
Dept: INTERNAL MEDICINE | Facility: CLINIC | Age: 60
End: 2020-05-29
Payer: MEDICAID

## 2020-05-29 VITALS
DIASTOLIC BLOOD PRESSURE: 86 MMHG | TEMPERATURE: 98.2 F | SYSTOLIC BLOOD PRESSURE: 124 MMHG | WEIGHT: 170 LBS | RESPIRATION RATE: 16 BRPM | HEIGHT: 67 IN | BODY MASS INDEX: 26.68 KG/M2 | OXYGEN SATURATION: 96 % | HEART RATE: 98 BPM

## 2020-05-29 DIAGNOSIS — Z86.19 PERSONAL HISTORY OF OTHER INFECTIOUS AND PARASITIC DISEASES: ICD-10-CM

## 2020-05-29 PROCEDURE — 99204 OFFICE O/P NEW MOD 45 MIN: CPT

## 2020-05-29 RX ORDER — ACETAMINOPHEN 325 MG/1
325 TABLET, FILM COATED ORAL EVERY 6 HOURS
Refills: 0 | Status: ACTIVE | COMMUNITY

## 2020-05-29 NOTE — HEALTH RISK ASSESSMENT
[0] : 1) Little interest or pleasure doing things: Not at all (0) [No] : In the past 12 months have you used drugs other than those required for medical reasons? No [] : No [[id number]] : 0 [Fully functional (bathing, dressing, toileting, transferring, walking, feeding)] : Fully functional (bathing, dressing, toileting, transferring, walking, feeding) [Patient reported mammogram was normal] : Patient reported mammogram was normal [Fully functional (using the telephone, shopping, preparing meals, housekeeping, doing laundry, using] : Fully functional and needs no help or supervision to perform IADLs (using the telephone, shopping, preparing meals, housekeeping, doing laundry, using transportation, managing medications and managing finances) [MammogramDate] : 01/19

## 2020-05-29 NOTE — ASSESSMENT
[FreeTextEntry1] : 1.Infection due to 2019 novel coronavirus: Patient continues to improve. Continue to wean overnight oxygen currently at 3 L nasal cannula to keep oxygen between 92 and 95% followup in 2 weeks.\par \par 2.prediabetes: Recent hemoglobin A1c of 6.4, discussed diabetic diet\par \par 3.thyroid carcinoma status post thyroidectomy: Continue on levothyroxine 112 mcg, follow up with endocrinology\par \par 4.hyperlipidemia: Continue on pravastatin.

## 2020-05-29 NOTE — HISTORY OF PRESENT ILLNESS
[de-identified] : Patient is a 60-year-old female history of thyroid CA status post thyroidectomy, hyperlipidemia, prediabetes, Lyme disease comes in to establish care. Patient was recently admitted to [facility] from April 16 until April 25 with covid 19 pneumonia. Patient was discharged on oxygen and states she's been able to slowly wean herself off over the last few days from daytime oxygen and now is only using oxygen overnight at 3 L with nasal cannula. Her O2 saturation in the evening is between 90 and 91%. She is being followed by [facility] until recently. She did have an episode of chest pain radiating to her left upper back on May 14 and went to [facility] emergency room. She had negative workup done and was told this is likely due to thoracic back pain. This has since resolved.\par Last week she did have a rash, upon her back and arms and states it's resolving on its own. She was told by nurse this could be related to her recent viral illness.\par [place]ocrinologist, urologist and oncologist as well as dermatologist and would like to have all records transferred here.\par Patient states that her cough and shortness of breath has greatly improved.She recently went to urgent care on May 23 and had negative Covid PCR nasal swab.\par Patient denies any cp, sob,abdominal pain, nausea, vomiting, palpitations, fever, chills, constipation, diarrhea.\[facility]  [FreeTextEntry1] : Patient comes in to establish care.\par

## 2020-06-12 ENCOUNTER — APPOINTMENT (OUTPATIENT)
Dept: INTERNAL MEDICINE | Facility: CLINIC | Age: 60
End: 2020-06-12
Payer: MEDICAID

## 2020-06-12 VITALS
SYSTOLIC BLOOD PRESSURE: 122 MMHG | RESPIRATION RATE: 16 BRPM | HEIGHT: 67 IN | WEIGHT: 170 LBS | DIASTOLIC BLOOD PRESSURE: 70 MMHG | OXYGEN SATURATION: 97 % | HEART RATE: 102 BPM | BODY MASS INDEX: 26.68 KG/M2 | TEMPERATURE: 98.2 F

## 2020-06-12 PROCEDURE — 99214 OFFICE O/P EST MOD 30 MIN: CPT

## 2020-06-12 RX ORDER — PRAVASTATIN SODIUM 80 MG/1
80 TABLET ORAL DAILY
Refills: 0 | Status: DISCONTINUED | COMMUNITY
End: 2020-06-12

## 2020-06-12 NOTE — HISTORY OF PRESENT ILLNESS
[FreeTextEntry1] : ANDREW STEPHENS is a 60 year F who comes in for a follow up visit.\par  [de-identified] : Patient is a 52-year-old female history of thyroid CA, novel 2019 grown a virus comes in for a followup visit. Patient states she was able to wean off of her oxygen from 3 L down to 2 L overnight. She is feeling much better. However, she is still feeling fatigued at times. Her T4 was mildly elevated in the hospital but TSH was normal. Her hemoglobin A1c was high at 6.4. She is still having some polyuria.\par Patient has a long history of bilateral hand pain for which she was going to see a rheumatologist for but never had the chance to. She states the pain has recently worsened after having a virus.\par Patient was also scheduled to see dermatologist and urologist. She does have a history of microscopic hematuria.\par Patient denies any cp, sob,abdominal pain, nausea, vomiting, palpitations, fever, chills, constipation, diarrhea.\par

## 2020-06-12 NOTE — ASSESSMENT
[FreeTextEntry1] : 1.infection due to 2019 novel coronavirus: Patient continues to improve, she will continue to wean off overnight oxygen from 2 L nasal cannula to keep oxygen between 92 and 95%. Follow up in 4 weeks.\par \par 2.prediabetes: Recheck hemoglobin A1c, continue diabetic diet.\par \par 3.thyroid CA status post thyroidectomy: Recheck TFTs, continue on levothyroxine 112 mcg. Refer to endocrinology.\par \par 4.generalized arthralgias with hand pain: Refer to rheumatology at this time.

## 2020-07-06 LAB
ANION GAP SERPL CALC-SCNC: 13 MMOL/L
BUN SERPL-MCNC: 17 MG/DL
CALCIUM SERPL-MCNC: 10 MG/DL
CHLORIDE SERPL-SCNC: 104 MMOL/L
CHOLEST SERPL-MCNC: 230 MG/DL
CHOLEST/HDLC SERPL: 3.6 RATIO
CO2 SERPL-SCNC: 25 MMOL/L
CREAT SERPL-MCNC: 0.85 MG/DL
ESTIMATED AVERAGE GLUCOSE: 111 MG/DL
GLUCOSE SERPL-MCNC: 114 MG/DL
HBA1C MFR BLD HPLC: 5.5 %
HDLC SERPL-MCNC: 64 MG/DL
LDLC SERPL CALC-MCNC: 138 MG/DL
POTASSIUM SERPL-SCNC: 4.5 MMOL/L
SODIUM SERPL-SCNC: 142 MMOL/L
T3FREE SERPL-MCNC: 3.18 PG/ML
T4 FREE SERPL-MCNC: 1.8 NG/DL
TRIGL SERPL-MCNC: 142 MG/DL
TSH SERPL-ACNC: 0.1 UIU/ML

## 2020-07-22 ENCOUNTER — APPOINTMENT (OUTPATIENT)
Dept: INTERNAL MEDICINE | Facility: CLINIC | Age: 60
End: 2020-07-22
Payer: MEDICAID

## 2020-07-22 VITALS
SYSTOLIC BLOOD PRESSURE: 114 MMHG | BODY MASS INDEX: 26.68 KG/M2 | OXYGEN SATURATION: 97 % | TEMPERATURE: 98.2 F | HEART RATE: 82 BPM | RESPIRATION RATE: 16 BRPM | DIASTOLIC BLOOD PRESSURE: 80 MMHG | HEIGHT: 67 IN | WEIGHT: 170 LBS

## 2020-07-22 PROCEDURE — 99214 OFFICE O/P EST MOD 30 MIN: CPT

## 2020-07-22 RX ORDER — HYDROCODONE BITARTRATE AND HOMATROPINE METHYLBROMIDE 5; 1.5 MG/5ML; MG/5ML
5-1.5 SYRUP ORAL
Refills: 0 | Status: DISCONTINUED | COMMUNITY
End: 2020-07-22

## 2020-07-22 NOTE — HISTORY OF PRESENT ILLNESS
[FreeTextEntry1] : ANDREW STEPHENS is a 60 year F who comes in for a follow up visit.\par  [de-identified] : Patient is a 52-year-old female history of hypothyroidism, thyroid carcinoma, covid 19 with pneumonia and is in for a follow up visit. Patient continues to use oxygen at times and he may placing 2 L nasal cannula on average 3-4 times a week. On 2 occasions she states that her machine beat with her heart rate low at about 55 oxygen okay at 96%. During the day she does not wear her oxygen.\par She continues to feel weak with some hair loss. Recent TSH was too low. Patient has not yet made followup with endocrinology. Instead of cutting back on her levothyroxine she started taking one tablet daily instead of skipping one tablet per week.\par Patient also recently had a flareup of lower back pain especially in the left lower back with no radiation. She took Tylenol and had more relief with Motrin.\par Patient also has a history of overactive bladder for which she was previously on oxybutynin 5 mg. She states that mildly helped and she does need a refill.\par Patient denies any cp, sob,abdominal pain, nausea, vomiting, palpitations, fever, chills, constipation, diarrhea.\par

## 2020-07-22 NOTE — ASSESSMENT
[FreeTextEntry1] : 1.pneumonia due to covid 19: Day she no longer on oxygen during the day. Has had 2 instances of mild bradycardia between 53 and 55 beats per minute and resolved after wearing her oxygen, option level at that time was about 93-94%. Still uses oxygen when she is having to exert herself. Will refer to pulmonary medicine to wean patient off oxygen.\par \par 2.hypothyroidism: Discussed instructions of levothyroxine 112 mcg to take 6 times a week and skip every Sunday, recheck TFTs in 6 weeks, get thyroid ultrasound and followup with endocrinology.\par \par 3.acute back pain: Obtain lumbar x-ray and take Motrin 3 tablets as needed for pain relief.\par \par 4.overactive bladder: Will send order for her incontinence supplies, increase oxybutynin to 5 mg b.i.d.

## 2020-08-07 ENCOUNTER — OUTPATIENT (OUTPATIENT)
Dept: OUTPATIENT SERVICES | Facility: HOSPITAL | Age: 60
LOS: 1 days | End: 2020-08-07
Payer: MEDICAID

## 2020-08-07 ENCOUNTER — APPOINTMENT (OUTPATIENT)
Dept: RADIOLOGY | Facility: CLINIC | Age: 60
End: 2020-08-07
Payer: MEDICAID

## 2020-08-07 ENCOUNTER — APPOINTMENT (OUTPATIENT)
Dept: ULTRASOUND IMAGING | Facility: CLINIC | Age: 60
End: 2020-08-07
Payer: MEDICAID

## 2020-08-07 DIAGNOSIS — M54.9 DORSALGIA, UNSPECIFIED: ICD-10-CM

## 2020-08-07 PROCEDURE — 76536 US EXAM OF HEAD AND NECK: CPT | Mod: 26

## 2020-08-07 PROCEDURE — 72100 X-RAY EXAM L-S SPINE 2/3 VWS: CPT

## 2020-08-07 PROCEDURE — 72100 X-RAY EXAM L-S SPINE 2/3 VWS: CPT | Mod: 26

## 2020-08-07 PROCEDURE — 76536 US EXAM OF HEAD AND NECK: CPT

## 2020-08-10 ENCOUNTER — APPOINTMENT (OUTPATIENT)
Dept: RHEUMATOLOGY | Facility: CLINIC | Age: 60
End: 2020-08-10
Payer: MEDICAID

## 2020-08-10 VITALS
TEMPERATURE: 97.5 F | SYSTOLIC BLOOD PRESSURE: 130 MMHG | BODY MASS INDEX: 26.06 KG/M2 | HEIGHT: 67 IN | DIASTOLIC BLOOD PRESSURE: 70 MMHG | WEIGHT: 166 LBS | OXYGEN SATURATION: 97 % | HEART RATE: 87 BPM

## 2020-08-10 DIAGNOSIS — Z78.9 OTHER SPECIFIED HEALTH STATUS: ICD-10-CM

## 2020-08-10 PROCEDURE — 99204 OFFICE O/P NEW MOD 45 MIN: CPT

## 2020-08-10 RX ORDER — GUAIFENESIN 600 MG/1
600 TABLET, EXTENDED RELEASE ORAL
Refills: 0 | Status: DISCONTINUED | COMMUNITY
End: 2020-08-10

## 2020-08-10 NOTE — ASSESSMENT
[FreeTextEntry1] : 10-year-old female with a history of thyroid cancer in remission presents as a new patient today. She presents for further evaluation of joint pains.\par \par Her current review of systems is positive for long-standing history of joint pains, they have progressively become worse since she was diagnosed with covid in April. She did not require intubation but was hospitalized for a week.\par \par She also admits to poor sleep.\par \par Today, on examination she has full range of motion of her joints without any evidence of inflammatory arthritis. She has features of osteoarthritis affecting her hands and knees. She also has multiple tender points on exam.\par \par At this time the differential diagnosis includes osteoarthritis and fibromyalgia. My suspicion for an inflammatory arthritis is low.\par \par Plan-\par -She is to have baseline labs done to rule out the above conditions rheumatoid arthritis, gout,monoclonal paraproteinemia.\par -Agree with evaluation with endocrine as well\par -She is to try tizanidine at bedtime, 2 mg she can increase it up to 4 mg as needed.\par -Currently uses Aleve in the mornings and it does help her. She also uses Tylenol.\par -If the pain is mild she can use Tylenol, if it is severe she can use Aleve.\par -I will be calling her with the results.\par -followup 6 weeks\par -She is aware to call if her symptoms worsen

## 2020-08-10 NOTE — HISTORY OF PRESENT ILLNESS
[FreeTextEntry1] : 60 year old female with a history of thyroid cancer in remission presents as a new patient today. She presents at the request of her primary care physician for further evaluation of her arthralgias. She has recently moved here, previously all her physicians were at [facility].\par \par She states that she was in her usual state of health until April, at that time she was diagnosed with covid, he was in the hospital for a week but has recovered. She did not require intubation. She has been more achy since then. She complains of aches and pains in most of her joints, almost all her joints bother her. She rates the pain as mild to moderate depending on the day and activity level. She admits to stiffness in the mornings as well. She admits to poor sleep and wakes up multiple times at night.\par \par She denies alopecia, oral ulcers, sicca symptoms or cold sensitivity. She denies rises or colitis. She denies asthma, frequent sinus infections or ear infections. There is no family history of autoimmunity.\par \par She has an average appetite and denies weight loss. She denies fevers or chills or night sweats. She is otherwise up-to-date on her age appropriate screenings. She has an appointment to see a new endocrinologist.

## 2020-08-10 NOTE — PHYSICAL EXAM
[General Appearance - Alert] : alert [General Appearance - Well Developed] : well developed [General Appearance - Well Nourished] : well nourished [Sclera] : the sclera and conjunctiva were normal [Oropharynx] : the oropharynx was normal [Neck Appearance] : the appearance of the neck was normal [Respiration, Rhythm And Depth] : normal respiratory rhythm and effort [Auscultation Breath Sounds / Voice Sounds] : lungs were clear to auscultation bilaterally [Full Pulse] : the pedal pulses are present [Heart Sounds] : normal S1 and S2 [Abdomen Tenderness] : non-tender [Edema] : there was no peripheral edema [Cervical Lymph Nodes Enlarged Anterior Bilaterally] : anterior cervical [No Spinal Tenderness] : no spinal tenderness [Supraclavicular Lymph Nodes Enlarged Bilaterally] : supraclavicular [Nail Clubbing] : no clubbing  or cyanosis of the fingernails [Musculoskeletal - Swelling] : no joint swelling seen [Abnormal Walk] : normal gait [Motor Tone] : muscle strength and tone were normal [Deep Tendon Reflexes (DTR)] : deep tendon reflexes were 2+ and symmetric [] : no rash [Oriented To Time, Place, And Person] : oriented to person, place, and time [Impaired Insight] : insight and judgment were intact [Motor Exam] : the motor exam was normal [Affect] : the affect was normal [FreeTextEntry1] : FROM neck, shoulders, elbows, wrists, hands, hips, knees, ankles and feet, including the small joints of the hands and feet without any evidence of inflammatory arthritis, hands with OA, multiple tender points on exam

## 2020-08-12 LAB
25(OH)D3 SERPL-MCNC: 41.5 NG/ML
ALBUMIN MFR SERPL ELPH: 59.9 %
ALBUMIN SERPL ELPH-MCNC: 4.5 G/DL
ALBUMIN SERPL-MCNC: 4 G/DL
ALBUMIN/GLOB SERPL: 1.5 RATIO
ALP BLD-CCNC: 42 U/L
ALPHA1 GLOB MFR SERPL ELPH: 4.2 %
ALPHA1 GLOB SERPL ELPH-MCNC: 0.3 G/DL
ALPHA2 GLOB MFR SERPL ELPH: 12.5 %
ALPHA2 GLOB SERPL ELPH-MCNC: 0.8 G/DL
ALT SERPL-CCNC: 17 U/L
ANA SER IF-ACNC: NEGATIVE
ANION GAP SERPL CALC-SCNC: 13 MMOL/L
AST SERPL-CCNC: 20 U/L
B-GLOBULIN MFR SERPL ELPH: 10.8 %
B-GLOBULIN SERPL ELPH-MCNC: 0.7 G/DL
BASOPHILS # BLD AUTO: 0.04 K/UL
BASOPHILS NFR BLD AUTO: 0.8 %
BILIRUB SERPL-MCNC: 0.2 MG/DL
BUN SERPL-MCNC: 12 MG/DL
CALCIUM SERPL-MCNC: 10 MG/DL
CCP AB SER IA-ACNC: <8 UNITS
CHLORIDE SERPL-SCNC: 105 MMOL/L
CO2 SERPL-SCNC: 26 MMOL/L
CREAT SERPL-MCNC: 0.84 MG/DL
CRP SERPL-MCNC: 0.34 MG/DL
DEPRECATED KAPPA LC FREE/LAMBDA SER: 0.86 RATIO
ENA SS-A AB SER IA-ACNC: <0.2 AL
ENA SS-B AB SER IA-ACNC: <0.2 AL
EOSINOPHIL # BLD AUTO: 0.09 K/UL
EOSINOPHIL NFR BLD AUTO: 1.9 %
ERYTHROCYTE [SEDIMENTATION RATE] IN BLOOD BY WESTERGREN METHOD: 24 MM/HR
GAMMA GLOB FLD ELPH-MCNC: 0.8 G/DL
GAMMA GLOB MFR SERPL ELPH: 12.6 %
GLUCOSE SERPL-MCNC: 95 MG/DL
HCT VFR BLD CALC: 40.8 %
HGB BLD-MCNC: 12.5 G/DL
IGA SER QL IEP: 154 MG/DL
IGG SER QL IEP: 752 MG/DL
IGM SER QL IEP: 42 MG/DL
IMM GRANULOCYTES NFR BLD AUTO: 0.2 %
INTERPRETATION SERPL IEP-IMP: NORMAL
KAPPA LC CSF-MCNC: 1.26 MG/DL
KAPPA LC SERPL-MCNC: 1.08 MG/DL
LYMPHOCYTES # BLD AUTO: 2.21 K/UL
LYMPHOCYTES NFR BLD AUTO: 45.5 %
M PROTEIN SPEC IFE-MCNC: NORMAL
MAN DIFF?: NORMAL
MCHC RBC-ENTMCNC: 28.4 PG
MCHC RBC-ENTMCNC: 30.6 GM/DL
MCV RBC AUTO: 92.7 FL
MONOCYTES # BLD AUTO: 0.43 K/UL
MONOCYTES NFR BLD AUTO: 8.8 %
NEUTROPHILS # BLD AUTO: 2.08 K/UL
NEUTROPHILS NFR BLD AUTO: 42.8 %
PLATELET # BLD AUTO: 260 K/UL
POTASSIUM SERPL-SCNC: 4.7 MMOL/L
PROT SERPL-MCNC: 6.6 G/DL
RBC # BLD: 4.4 M/UL
RBC # FLD: 12.4 %
RF+CCP IGG SER-IMP: NEGATIVE
RHEUMATOID FACT SER QL: <10 IU/ML
SODIUM SERPL-SCNC: 144 MMOL/L
URATE SERPL-MCNC: 5.2 MG/DL
WBC # FLD AUTO: 4.86 K/UL

## 2020-08-26 ENCOUNTER — APPOINTMENT (OUTPATIENT)
Dept: INTERNAL MEDICINE | Facility: CLINIC | Age: 60
End: 2020-08-26
Payer: MEDICAID

## 2020-08-26 VITALS
OXYGEN SATURATION: 97 % | RESPIRATION RATE: 18 BRPM | BODY MASS INDEX: 26.37 KG/M2 | TEMPERATURE: 98.1 F | HEIGHT: 67 IN | DIASTOLIC BLOOD PRESSURE: 80 MMHG | WEIGHT: 168 LBS | HEART RATE: 82 BPM | SYSTOLIC BLOOD PRESSURE: 118 MMHG

## 2020-08-26 PROCEDURE — 99204 OFFICE O/P NEW MOD 45 MIN: CPT

## 2020-08-26 NOTE — HISTORY OF PRESENT ILLNESS
[TextBox_4] : The initial pulmonary visit for this 60-year-old female with a history of thyroid cancer, hypothyroidism, hyperlipidemia.  Treated at [facility] in April for COVID infection and COVID pneumonia.  she subsequently tested negative for COVID on May 23.  Continues to use home O2 at 2 L/min as needed.  For example when her O2 sat gets to 91%.  She does notice some dyspnea with walking.  She has only an infrequent cough and is not bringing up sputum or noting wheezing.  She is not having  fever or chills.\par \par She is a non-smoker.  She has no history of asthma or COPD.

## 2020-08-26 NOTE — PHYSICAL EXAM
[No Acute Distress] : no acute distress [Normal Appearance] : normal appearance [Normal Oropharynx] : normal oropharynx [No Neck Mass] : no neck mass [Normal Rate/Rhythm] : normal rate/rhythm [Normal S1, S2] : normal s1, s2 [No Murmurs] : no murmurs [Clear to Auscultation Bilaterally] : clear to auscultation bilaterally [No Resp Distress] : no resp distress [Benign] : benign [No Abnormalities] : no abnormalities [Normal Gait] : normal gait [No Clubbing] : no clubbing [No Cyanosis] : no cyanosis [No Edema] : no edema [Normal Color/ Pigmentation] : normal color/ pigmentation [No Focal Deficits] : no focal deficits [Oriented x3] : oriented x3 [Normal Affect] : normal affect

## 2020-08-26 NOTE — ASSESSMENT
[FreeTextEntry1] : #1  Residual dyspnea from prior COVID pneumonia in April of this year.  We will repeat PA and lateral chest x-ray.  May try Pro-air HFA 1 to 2 puffs 4 times daily as needed.  Use 2 L/min nasal cannula O2 prn;   if O2  sat less than 93%, if senses shortness of breath and try using during sleep.  To continue physical therapy as tolerated.  For return pulmonary appointment in 6 months or on an as-needed basis.

## 2020-08-26 NOTE — REVIEW OF SYSTEMS
[Fever] : no fever [Chills] : no chills [Sputum] : no sputum [Dyspnea] : dyspnea [Hemoptysis] : no hemoptysis [Thyroid Problem] : thyroid problem [Wheezing] : no wheezing [Arthralgias] : arthralgias [TextBox_30] : see above [Negative] : Endocrine

## 2020-09-15 ENCOUNTER — APPOINTMENT (OUTPATIENT)
Dept: RADIOLOGY | Facility: CLINIC | Age: 60
End: 2020-09-15
Payer: MEDICAID

## 2020-09-15 ENCOUNTER — OUTPATIENT (OUTPATIENT)
Dept: OUTPATIENT SERVICES | Facility: HOSPITAL | Age: 60
LOS: 1 days | End: 2020-09-15
Payer: MEDICAID

## 2020-09-15 DIAGNOSIS — J12.89 OTHER VIRAL PNEUMONIA: ICD-10-CM

## 2020-09-15 DIAGNOSIS — U07.1 COVID-19: ICD-10-CM

## 2020-09-15 PROCEDURE — 71046 X-RAY EXAM CHEST 2 VIEWS: CPT | Mod: 26

## 2020-09-15 PROCEDURE — 71046 X-RAY EXAM CHEST 2 VIEWS: CPT

## 2020-09-21 ENCOUNTER — APPOINTMENT (OUTPATIENT)
Dept: RHEUMATOLOGY | Facility: CLINIC | Age: 60
End: 2020-09-21
Payer: MEDICAID

## 2020-09-21 VITALS
DIASTOLIC BLOOD PRESSURE: 86 MMHG | HEIGHT: 67 IN | TEMPERATURE: 97.2 F | BODY MASS INDEX: 25.9 KG/M2 | HEART RATE: 83 BPM | WEIGHT: 165 LBS | SYSTOLIC BLOOD PRESSURE: 144 MMHG | OXYGEN SATURATION: 97 %

## 2020-09-21 PROCEDURE — 99213 OFFICE O/P EST LOW 20 MIN: CPT

## 2020-09-21 RX ORDER — SERTRALINE HYDROCHLORIDE 100 MG/1
100 TABLET, FILM COATED ORAL
Qty: 30 | Refills: 0 | Status: DISCONTINUED | COMMUNITY
Start: 2020-08-07

## 2020-09-21 RX ORDER — B-COMPLEX WITH VITAMIN C
500-200 TABLET ORAL
Qty: 30 | Refills: 0 | Status: DISCONTINUED | COMMUNITY
Start: 2020-07-13

## 2020-09-21 RX ORDER — AMOXICILLIN 500 MG/1
500 CAPSULE ORAL
Qty: 21 | Refills: 0 | Status: DISCONTINUED | COMMUNITY
Start: 2020-08-06

## 2020-09-21 RX ORDER — QUETIAPINE FUMARATE 100 MG/1
100 TABLET ORAL
Qty: 30 | Refills: 0 | Status: DISCONTINUED | COMMUNITY
Start: 2020-03-25

## 2020-09-21 RX ORDER — DIPHENHYDRAMINE HCL 25 MG
25 TABLET ORAL
Qty: 30 | Refills: 0 | Status: DISCONTINUED | COMMUNITY
Start: 2020-05-06

## 2020-09-21 RX ORDER — SENNOSIDES 8.6 MG/1
100 TABLET, COATED ORAL
Qty: 118 | Refills: 0 | Status: DISCONTINUED | COMMUNITY
Start: 2020-05-11

## 2020-09-21 RX ORDER — FLUOCINONIDE 0.05 MG/G
0.05 OINTMENT TOPICAL
Qty: 30 | Refills: 0 | Status: DISCONTINUED | COMMUNITY
Start: 2020-07-27

## 2020-09-21 RX ORDER — MELOXICAM 7.5 MG/1
7.5 TABLET ORAL
Qty: 30 | Refills: 0 | Status: DISCONTINUED | COMMUNITY
Start: 2020-04-29

## 2020-09-21 RX ORDER — RIVAROXABAN 10 MG/1
10 TABLET, FILM COATED ORAL
Qty: 30 | Refills: 0 | Status: DISCONTINUED | COMMUNITY
Start: 2020-04-24

## 2020-09-22 NOTE — PHYSICAL EXAM
[General Appearance - Alert] : alert [General Appearance - Well Nourished] : well nourished [General Appearance - Well Developed] : well developed [Sclera] : the sclera and conjunctiva were normal [Oropharynx] : the oropharynx was normal [Neck Appearance] : the appearance of the neck was normal [Respiration, Rhythm And Depth] : normal respiratory rhythm and effort [Auscultation Breath Sounds / Voice Sounds] : lungs were clear to auscultation bilaterally [Heart Sounds] : normal S1 and S2 [Full Pulse] : the pedal pulses are present [Edema] : there was no peripheral edema [Abdomen Tenderness] : non-tender [Cervical Lymph Nodes Enlarged Anterior Bilaterally] : anterior cervical [Supraclavicular Lymph Nodes Enlarged Bilaterally] : supraclavicular [No Spinal Tenderness] : no spinal tenderness [Abnormal Walk] : normal gait [Nail Clubbing] : no clubbing  or cyanosis of the fingernails [Musculoskeletal - Swelling] : no joint swelling seen [Motor Tone] : muscle strength and tone were normal [FreeTextEntry1] : FROM neck, shoulders, elbows, wrists, hands, hips, knees, ankles and feet, including the small joints of the hands and feet without any evidence of inflammatory arthritis, hands with OA, multiple tender points on exam [] : no rash [Deep Tendon Reflexes (DTR)] : deep tendon reflexes were 2+ and symmetric [Motor Exam] : the motor exam was normal [Oriented To Time, Place, And Person] : oriented to person, place, and time [Impaired Insight] : insight and judgment were intact [Affect] : the affect was normal

## 2020-09-22 NOTE — HISTORY OF PRESENT ILLNESS
[FreeTextEntry1] : 60 year old female with a history of thyroid cancer in remission presents at the request of her primary care physician for further evaluation of her arthralgias. She has recently moved here, previously all her physicians were at [facility].\par \par She states that she was in her usual state of health until April, at that time she was diagnosed with covid, she was in the hospital for a week but has recovered. She did not require intubation. She has been more achy since then. She complains of aches and pains in most of her joints, almost all her joints bother her. She rates the pain as mild to moderate depending on the day and activity level. She admits to stiffness in the mornings as well. She admits to poor sleep and wakes up multiple times at night.\par \par She denies alopecia, oral ulcers, sicca symptoms or cold sensitivity. She denies rises or colitis. She denies asthma, frequent sinus infections or ear infections. There is no family history of autoimmunity.\par \par She has an average appetite and denies weight loss. She denies fevers or chills or night sweats. She is otherwise up-to-date on her age appropriate screenings. \par She went for physical therapy once. She uses Tylenol and NSAIDs, she also has a prescription for Mobic in the past and wants to know how she should use these medications. It turns out that she is on Seroquel but uses it every other day. She does not recall using Cymbalta. She rates her current symptoms around a 5/10. She admits to poor sleep and wakes up achy

## 2020-09-22 NOTE — ASSESSMENT
[FreeTextEntry1] : 60-year-old female with a history of thyroid cancer in remission presents for further evaluation of joint pains.\par \par Her current review of systems is positive for long-standing history of joint pains, they have progressively become worse since she was diagnosed with covid in April. She did not require intubation but was hospitalized for a week.\par \par She also admits to poor sleep.\par \par Today, on examination she has full range of motion of her joints without any evidence of inflammatory arthritis. She has features of osteoarthritis affecting her hands and knees. She also has multiple tender points on exam.\par \par At this time the differential diagnosis includes osteoarthritis and fibromyalgia. My suspicion for an inflammatory arthritis is low.\par \par Plan-\par -She has been seronegative with me\par -She is to try tizanidine at bedtime, 2 mg she can increase it up to 4 mg as needed.\par -Currently uses Aleve in the mornings and it does help her. She also uses Tylenol.\par -If the pain is mild she can use Tylenol, if it is severe she can use Aleve.\par -She has a prescription for Mobic from the past, I would renew it and she can use it if the pain is severe, advised not to combine Aleve and Mobic together.\par -she has multiple tender points on exam, she does not use the Seroquel regularly. We'll discuss with the primary care physician whether she can be switched to Cymbalta. States that she was on Zoloft but it was discontinued\par -Recommend physical therapy as she does have underlying osteoarthritis.\par -Trial of Voltaren gel\par -followup 4-6 months\par -She is aware to call if her symptoms worsen

## 2020-09-23 ENCOUNTER — APPOINTMENT (OUTPATIENT)
Dept: INTERNAL MEDICINE | Facility: CLINIC | Age: 60
End: 2020-09-23
Payer: MEDICAID

## 2020-09-23 VITALS
OXYGEN SATURATION: 98 % | WEIGHT: 164 LBS | TEMPERATURE: 97.2 F | SYSTOLIC BLOOD PRESSURE: 104 MMHG | HEART RATE: 98 BPM | RESPIRATION RATE: 16 BRPM | DIASTOLIC BLOOD PRESSURE: 68 MMHG | BODY MASS INDEX: 25.74 KG/M2 | HEIGHT: 67 IN

## 2020-09-23 DIAGNOSIS — R32 UNSPECIFIED URINARY INCONTINENCE: ICD-10-CM

## 2020-09-23 DIAGNOSIS — H66.92 OTITIS MEDIA, UNSPECIFIED, LEFT EAR: ICD-10-CM

## 2020-09-23 DIAGNOSIS — R59.9 ENLARGED LYMPH NODES, UNSPECIFIED: ICD-10-CM

## 2020-09-23 LAB
T3FREE SERPL-MCNC: 3.19 PG/ML
T4 FREE SERPL-MCNC: 1.8 NG/DL
TSH SERPL-ACNC: 0.27 UIU/ML

## 2020-09-23 PROCEDURE — 99215 OFFICE O/P EST HI 40 MIN: CPT

## 2020-09-23 NOTE — HISTORY OF PRESENT ILLNESS
[FreeTextEntry1] : ANDREW STEPHENS is a 60 year F who comes in for a follow up visit.\par  [de-identified] : Patient is a 52-year-old female with history of corona virus, hypothyroidism, thyroid carcinoma status post partial thyroidectomy, fibromyalgia comes in for a followup visit.\par Since being her last she did establish care with rheumatology and is diagnosed with possible fibromyalgia. It was recommended that if she does not take the Seroquel on a daily basis if she wanted to start Cymbalta due to her history of depression as well 5 myalgia. At this time patient states that she is unsure due to possible side effects.\par We did review her recent thyroid blood work which is within normal limits doing well on levothyroxine 112 mcg 6 times a week skipping Sunday. We did review her recent thyroid ultrasound as well which showed left-sided possible thyroid nodule versus postsurgical changes. On the right side she has a cervical lymph node which needs ultrasound followup versus biopsy. Patient states she does have a history of this in the past and is to make a follow up with her ENT oncologist Dr.Ernesto Velez regarding next steps.\par Patient is also been having pain in the left side of her throat extending to her left ear for the past one week. No discharge from the ear or fever or chills.\par She did see pulmonology for her continued shortness of breath status post coronavirus pneumonia and had a chest x-ray that was negative. Patient was told to take Proventil inhaler.\par We did also review her recent lumbar x-ray which showed retrolisthesis at multiple levels. She did go to physical therapy on one occasion but has not followed up since.\par Patient denies any cp, sob,abdominal pain, nausea, vomiting, palpitations, fever, chills, constipation, diarrhea.\par

## 2020-09-23 NOTE — ASSESSMENT
[FreeTextEntry1] : 1.fibromyalgia: Patient does not wish to start on Cymbalta at this time, she will followup with dermatology and start physical therapy.\par \par 2.chronic pain with depression: Continue on Seroquel as needed.\par \par 3.hypothyroidism: Recent thyroid function testing normal, continue on levothyroxine 112 mcg 6 times a week, skipping Sunday. Recheck TFTs in 2 months.\par \par 4.thyroid carcinoma: thyroid ultrasound showing left-sided possible thyroid nodule and right side showing cervical lymph node which needs evaluation with biopsy. Patient will follow up with her head and neck oncologist  in [place]. \par \par 5.left otitis media: Started amoxicillin. Went over instructions and side effects of medication.\par \par 6.lower back pain with lumbar retrolisthesis: Continue with physical therapy. Ex\par \par 7.Covid pneumonia:Patient is followed with pulmonary medicine recent chest x-rays negative. Continue on pro air as needed and nighttime oxygen and follow up with pulmonary medicine.\par \par 8.urinary incontinence: Continue and oxybutynin and given prescription to have incontinence supplies.

## 2020-11-19 ENCOUNTER — APPOINTMENT (OUTPATIENT)
Dept: INTERNAL MEDICINE | Facility: CLINIC | Age: 60
End: 2020-11-19
Payer: MEDICAID

## 2020-11-19 VITALS
RESPIRATION RATE: 16 BRPM | OXYGEN SATURATION: 99 % | WEIGHT: 165 LBS | DIASTOLIC BLOOD PRESSURE: 80 MMHG | SYSTOLIC BLOOD PRESSURE: 120 MMHG | BODY MASS INDEX: 25.9 KG/M2 | TEMPERATURE: 96.6 F | HEIGHT: 67 IN | HEART RATE: 90 BPM

## 2020-11-19 LAB
T3FREE SERPL-MCNC: 2.93 PG/ML
T4 FREE SERPL-MCNC: 1.9 NG/DL
TSH SERPL-ACNC: 0.11 UIU/ML

## 2020-11-19 PROCEDURE — G0008: CPT

## 2020-11-19 PROCEDURE — 90686 IIV4 VACC NO PRSV 0.5 ML IM: CPT

## 2020-11-19 PROCEDURE — 99214 OFFICE O/P EST MOD 30 MIN: CPT | Mod: 25

## 2020-11-19 RX ORDER — CALCIUM CARBONATE/VITAMIN D3 500MG-5MCG
500-200 TABLET ORAL TWICE DAILY
Refills: 0 | Status: ACTIVE | COMMUNITY

## 2020-11-19 RX ORDER — AMOXICILLIN 875 MG/1
875 TABLET, FILM COATED ORAL
Qty: 14 | Refills: 0 | Status: DISCONTINUED | COMMUNITY
Start: 2020-09-23 | End: 2020-11-19

## 2020-11-19 NOTE — ASSESSMENT
[FreeTextEntry1] : 1.hypothyroidism with history of thyroid carcinoma colon encourage patient to followup with head and neck surgeon as she had recent abnormal thyroid ultrasound. Decrease levothyroxine to 100 mcg daily and recheck TFTs in 2 months.\par \par 2.bilateral calf pain: Obtain venous ultrasound duplex bilaterally to rule out DVT with her history of Corona virus.\par \par 3.depression: Counseling provided to the patient, she'll continue on Seroquel and will continue to think about whether or not she wants to start Cymbalta.

## 2020-11-19 NOTE — HISTORY OF PRESENT ILLNESS
[FreeTextEntry1] : ANDREW STEPHENS is a 60 year F who comes in for a follow up visit.\par  [de-identified] : Patient is a 60-year-old female with a history of covid 19 pneumonia, hypothyroidism, GERD carcinoma status post partial left thyroidectomy, fibromyalgia comes in for a followup visit. Her recent TFTs show TSH to be low and free T4 to be mildly elevated. She has been taking levothyroxine 112 mcg 6 days a week. She is still not able to seeing her head and neck surgeon and she's had increased amounts of stress with her family in [facility] as there is a civil war going on.\par She's recently been seeing the dentist and had full dentures placed which is causing her to have a lot of pain.\par Patient also notes a great amount of insomnia due to her recent increased stress. She's also noticed some on and off bilateral calf pain worse in the evening usually. She's taken ibuprofen without much relief.\par Patient denies any cp, sob,abdominal pain, nausea, vomiting, palpitations, fever, chills, constipation, diarrhea.\par

## 2020-12-12 ENCOUNTER — OUTPATIENT (OUTPATIENT)
Dept: OUTPATIENT SERVICES | Facility: HOSPITAL | Age: 60
LOS: 1 days | End: 2020-12-12
Payer: MEDICAID

## 2020-12-12 ENCOUNTER — RESULT REVIEW (OUTPATIENT)
Age: 60
End: 2020-12-12

## 2020-12-12 ENCOUNTER — APPOINTMENT (OUTPATIENT)
Dept: ULTRASOUND IMAGING | Facility: CLINIC | Age: 60
End: 2020-12-12
Payer: MEDICAID

## 2020-12-12 DIAGNOSIS — Z00.8 ENCOUNTER FOR OTHER GENERAL EXAMINATION: ICD-10-CM

## 2020-12-12 PROCEDURE — 93970 EXTREMITY STUDY: CPT | Mod: 26

## 2020-12-12 PROCEDURE — 93970 EXTREMITY STUDY: CPT

## 2020-12-14 ENCOUNTER — NON-APPOINTMENT (OUTPATIENT)
Age: 60
End: 2020-12-14

## 2020-12-23 PROBLEM — H66.92 OTITIS MEDIA, LEFT: Status: RESOLVED | Noted: 2020-09-23 | Resolved: 2020-12-23

## 2021-02-24 ENCOUNTER — APPOINTMENT (OUTPATIENT)
Dept: INTERNAL MEDICINE | Facility: CLINIC | Age: 61
End: 2021-02-24

## 2021-03-02 ENCOUNTER — NON-APPOINTMENT (OUTPATIENT)
Age: 61
End: 2021-03-02

## 2021-03-02 ENCOUNTER — APPOINTMENT (OUTPATIENT)
Dept: INTERNAL MEDICINE | Facility: CLINIC | Age: 61
End: 2021-03-02
Payer: MEDICAID

## 2021-03-02 VITALS
HEART RATE: 78 BPM | TEMPERATURE: 97.8 F | HEIGHT: 67 IN | BODY MASS INDEX: 24.96 KG/M2 | OXYGEN SATURATION: 98 % | SYSTOLIC BLOOD PRESSURE: 128 MMHG | DIASTOLIC BLOOD PRESSURE: 68 MMHG | RESPIRATION RATE: 16 BRPM | WEIGHT: 159 LBS

## 2021-03-02 DIAGNOSIS — J12.82 COVID-19: ICD-10-CM

## 2021-03-02 DIAGNOSIS — Z20.822 CONTACT WITH AND (SUSPECTED) EXPOSURE TO COVID-19: ICD-10-CM

## 2021-03-02 DIAGNOSIS — U07.1 COVID-19: ICD-10-CM

## 2021-03-02 PROCEDURE — 99214 OFFICE O/P EST MOD 30 MIN: CPT

## 2021-03-02 PROCEDURE — 99072 ADDL SUPL MATRL&STAF TM PHE: CPT

## 2021-03-02 NOTE — REVIEW OF SYSTEMS
[Fever] : no fever [Cough] : no cough [Hemoptysis] : no hemoptysis [Sputum] : no sputum [Depression] : depression [Negative] : Endocrine [TextBox_30] : see above [TextBox_137] : see above

## 2021-03-02 NOTE — HISTORY OF PRESENT ILLNESS
[TextBox_4] : Is a return visit for this 61-year-old female who was treated for Covid pneumonia in April 2020.  He is continuing to note some dyspnea on exertion intermittently.  She is currently wearing nasal cannula O2 during sleep.  She is not having coughing, wheezing, sputum production, or hemoptysis.  Has been using her rescue ProAir inhaler about 2-3 times per week.  She is a non-smoker and has no history of asthma or COPD.  She is not having fever.\par \par Chest x-ray on September 15, 2020 showed clear lungs with resolution of the previous opacites seen in May 2020.\par \par She will be seeing her head and neck doctor regarding following of her thyroid.\par \par She does admit to having a good amount of stress.  She is currently on Seroquel with consideration of her primary doctor adding Cymbalta.

## 2021-03-02 NOTE — ASSESSMENT
[FreeTextEntry1] : #1  Patient reports intermittent dyspnea on exertion.  Continue to monitor her O2 sats at home.  I am ordering for her to have an overnight oximetry study to see if she needs ongoing oxygen treatment.  I am also ordering full pulmonary function testing.  She can use ProAir 1 to 2 puffs qid on a as needed basis, if she finds this to be helpful for her symptoms. \par \par #2  Patient to see head and neck surgeon regarding further thyroid evaluation.\par \par #3  Stress. Continue following with primary physician Dr. Spaulding.

## 2021-03-16 ENCOUNTER — APPOINTMENT (OUTPATIENT)
Dept: INTERNAL MEDICINE | Facility: CLINIC | Age: 61
End: 2021-03-16
Payer: MEDICAID

## 2021-03-16 VITALS
SYSTOLIC BLOOD PRESSURE: 130 MMHG | DIASTOLIC BLOOD PRESSURE: 80 MMHG | HEART RATE: 78 BPM | OXYGEN SATURATION: 98 % | WEIGHT: 157 LBS | RESPIRATION RATE: 16 BRPM | TEMPERATURE: 96.8 F | HEIGHT: 67 IN | BODY MASS INDEX: 24.64 KG/M2

## 2021-03-16 DIAGNOSIS — M54.2 CERVICALGIA: ICD-10-CM

## 2021-03-16 LAB
T3FREE SERPL-MCNC: 2.42 PG/ML
T4 FREE SERPL-MCNC: 1.5 NG/DL
THYROGLOB AB SERPL-ACNC: <20 IU/ML
THYROGLOB SERPL-MCNC: <0.2 NG/ML
TSH SERPL-ACNC: 0.26 UIU/ML

## 2021-03-16 PROCEDURE — 99072 ADDL SUPL MATRL&STAF TM PHE: CPT

## 2021-03-16 PROCEDURE — 99214 OFFICE O/P EST MOD 30 MIN: CPT

## 2021-03-16 NOTE — ASSESSMENT
[FreeTextEntry1] : 1.thyroid carcinoma colon she did see her ENT surgeon and was told that ultrasound of the thyroid was stable. We discussed her thyroid blood work which shows improvement in her TSH and free T4 with thyroglobulin less than 0.2. Advise cutting back on her levothyroxine to 100 mcg 6 days a week from 7 days a week and recheck TFTs in 2 months.\par letter for eligibility given for vaccine.\par \par 2.left neck/meds/low back pain: She only completed one therapy session last year. Given another [facility].\par \par 3.dyspnea on exertion: With history of corona virus infection. Follow up with pulmonary medicine with PFTs on Friday. Continue using oxygen and pro air inhaler as needed.\par \par

## 2021-03-16 NOTE — HISTORY OF PRESENT ILLNESS
[FreeTextEntry1] : FU [de-identified] : ANDREW STEPHENS is a 61 year F who comes in for a follow up visit.\par She began with lower back pain that radiates to upper back and to LLE. SHe also feels left shoulder and mid back pain. She also feels left neck numbness/tingling for the past 3-4 days. \par She did see her ENT surgeon  last Thursday and he did do repeat thyroid us and it did show nodule and cervical LN and he said no biopsy at this time. \par Continues to have MONTERROSO and saw pulmonary and is having PFT's this Friday. \par Patient denies any cp, sob,abdominal pain, nausea, vomiting, palpitations, fever, chills, constipation, diarrhea.\par

## 2021-03-17 LAB — SARS-COV-2 N GENE NPH QL NAA+PROBE: NOT DETECTED

## 2021-03-19 ENCOUNTER — APPOINTMENT (OUTPATIENT)
Dept: INTERNAL MEDICINE | Facility: CLINIC | Age: 61
End: 2021-03-19
Payer: MEDICAID

## 2021-03-19 VITALS
BODY MASS INDEX: 25.71 KG/M2 | RESPIRATION RATE: 16 BRPM | TEMPERATURE: 98.1 F | DIASTOLIC BLOOD PRESSURE: 78 MMHG | SYSTOLIC BLOOD PRESSURE: 124 MMHG | OXYGEN SATURATION: 98 % | WEIGHT: 160 LBS | HEART RATE: 73 BPM | HEIGHT: 66 IN

## 2021-03-19 DIAGNOSIS — R06.00 DYSPNEA, UNSPECIFIED: ICD-10-CM

## 2021-03-19 PROCEDURE — 94727 GAS DIL/WSHOT DETER LNG VOL: CPT

## 2021-03-19 PROCEDURE — 99072 ADDL SUPL MATRL&STAF TM PHE: CPT

## 2021-03-19 PROCEDURE — 94729 DIFFUSING CAPACITY: CPT

## 2021-03-19 PROCEDURE — 94060 EVALUATION OF WHEEZING: CPT

## 2021-03-29 PROBLEM — R06.00 DYSPNEA ON EXERTION: Status: ACTIVE | Noted: 2020-08-26

## 2021-04-15 NOTE — ED ADULT NURSE NOTE - CAS EDN DISCHARGE INTERVENTIONS
Well Child Visit at 4 Months   AMBULATORY CARE:   A well child visit  is when your child sees a healthcare provider to prevent health problems  Well child visits are used to track your child's growth and development  It is also a time for you to ask questions and to get information on how to keep your child safe  Write down your questions so you remember to ask them  Your child should have regular well child visits from birth to 16 years  Development milestones your baby may reach at 4 months:  Each baby develops at his or her own pace  Your baby might have already reached the following milestones, or he or she may reach them later:  · Smile and laugh    ·  in response to someone cooing at him or her    · Bring his or her hands together in front of him or her    · Reach for objects and grasp them, and then let them go    · Bring toys to his or her mouth    · Control his or her head when he or she is placed in a seated position    · Hold his or her head and chest up and support himself or herself on his or her arms when he or she is placed on his or her tummy    · Roll from front to back    What you can do when your baby cries:  Your baby may cry because he or she is hungry  He or she may have a wet diaper, or feel hot or cold  He or she may cry for no reason you can find  Your baby may cry more often in the evening or late afternoon  It can be hard to listen to your baby cry and not be able to calm him or her down  Ask for help and take a break if you feel stressed or overwhelmed  Never shake your baby to try to stop his or her crying  This can cause blindness or brain damage  The following may help comfort your baby:  · Hold your baby skin to skin and rock him or her, or swaddle him or her in a soft blanket  · Gently pat your baby's back or chest  Stroke or rub his or her head  · Quietly sing or talk to your baby, or play soft, soothing music      · Put your baby in his or her car seat and take him or her for a drive, or go for a stroller ride  · Burp your baby to get rid of extra gas  · Give your baby a soothing, warm bath  Keep your baby safe in the car:   · Always place your baby in a rear-facing car seat  Choose a seat that meets the Federal Motor Vehicle Safety Standard 213  Make sure the child safety seat has a harness and clip  Also make sure that the harness and clips fit snugly against your baby  There should be no more than a finger width of space between the strap and your baby's chest  Ask your healthcare provider for more information on car safety seats  · Always put your baby's car seat in the back seat  Never put your baby's car seat in the front  This will help prevent him or her from being injured in an accident  Keep your baby safe at home:   · Do not give your baby medicine unless directed by his or her healthcare provider  Ask for directions if you do not know how to give the medicine  If your baby misses a dose, do not double the next dose  Ask how to make up the missed dose  Do not give aspirin to children under 25years of age  Your child could develop Reye syndrome if he takes aspirin  Reye syndrome can cause life-threatening brain and liver damage  Check your child's medicine labels for aspirin, salicylates, or oil of wintergreen  · Do not leave your baby on a changing table, couch, bed, or infant seat alone  Your baby could roll or push himself or herself off  Keep one hand on your baby as you change his or her diaper or clothes  · Never leave your baby alone in the bathtub or sink  A baby can drown in less than 1 inch of water  · Always test the water temperature before you give your baby a bath  Test the water on your wrist before putting your baby in the bath to make sure it is not too hot  If you have a bath thermometer, the water temperature should be 90°F to 100°F (32 3°C to 37 8°C)   Keep your faucet water temperature lower than 120°F     · Never leave your baby in a playpen or crib with the drop-side down  Your baby could fall and be injured  Make sure the drop-side is locked in place  · Do not let your baby use a walker  Walkers are not safe for your baby  Walkers do not help your baby learn to walk  Your baby can roll down the stairs  Walkers also allow your baby to reach higher  Your baby might reach for hot drinks, grab pot handles off the stove, or reach for medicines or other unsafe items  How to lay your baby down to sleep: It is very important to lay your baby down to sleep in safe surroundings  This can greatly reduce his or her risk for SIDS  Tell grandparents, babysitters, and anyone else who cares for your baby the following rules:  · Put your baby on his or her back to sleep  Do this every time he or she sleeps (naps and at night)  Do this even if your baby sleeps more soundly on his or her stomach or side  Your baby is less likely to choke on spit-up or vomit if he or she sleeps on his or her back  · Put your baby on a firm, flat surface to sleep  Your baby should sleep in a crib, bassinet, or cradle that meets the safety standards of the Consumer Product Safety Commission ([address])  Do not let him or her sleep on pillows, waterbeds, soft mattresses, quilts, beanbags, or other soft surfaces  Move your baby to his or her bed if he or she falls asleep in a car seat, stroller, or swing  He or she may change positions in a sitting device and not be able to breathe well  · Put your baby to sleep in a crib or bassinet that has firm sides  The rails around your baby's crib should not be more than 2? inches apart  A mesh crib should have small openings less than ¼ inch  · Put your baby in his or her own bed  A crib or bassinet in your room, near your bed, is the safest place for your baby to sleep  Never let him or her sleep in bed with you  Never let him or her sleep on a couch or recliner      · Do not leave soft objects or loose bedding in his or her crib  His or her bed should contain only a mattress covered with a fitted bottom sheet  Use a sheet that is made for the mattress  Do not put pillows, bumpers, comforters, or stuffed animals in the bed  Dress your baby in a sleep sack or other sleep clothing before you put him or her down to sleep  Do not use loose blankets  If you must use a blanket, tuck it around the mattress  · Do not let your baby get too hot  Keep the room at a temperature that is comfortable for an adult  Never dress your baby in more than 1 layer more than you would wear  Do not cover your baby's face or head while he or she sleeps  Your baby is too hot if he or she is sweating or his or her chest feels hot  · Do not raise the head of your baby's bed  Your baby could slide or roll into a position that makes it hard for him or her to breathe  What you need to know about feeding your baby:  Breast milk or iron-fortified formula is the only food your baby needs for the first 4 to 6 months of life  · Breast milk gives your baby the best nutrition  It also has antibodies and other substances that help protect your baby's immune system  Babies should breastfeed for about 10 to 20 minutes or longer on each breast  Your baby will need 8 to 12 feedings every 24 hours  If he or she sleeps for more than 4 hours at one time, wake him or her up to eat  · Iron-fortified formula also provides all the nutrients your baby needs  Formula is available in a concentrated liquid or powder form  You need to add water to these formulas  Follow the directions when you mix the formula so your baby gets the right amount of nutrients  There is also a ready-to-feed formula that does not need to be mixed with water  Ask your healthcare provider which formula is right for your baby  As your baby gets older, he or she will drink 26 to 36 ounces each day   When he or she starts to sleep for longer periods, he or she will still need to feed 6 to 8 times in 24 hours  · Do not overfeed your baby  Overfeeding means your baby gets too many calories during a feeding  This may cause him or her to gain weight too fast  Do not try to continue to feed your baby when he or she is no longer hungry  · Do not add baby cereal to the bottle  Overfeeding can happen if you add baby cereal to formula or breast milk  You can make more if your baby is still hungry after he or she finishes a bottle  · Do not use a microwave to heat your baby's bottle  The milk or formula will not heat evenly and will have spots that are very hot  Your baby's face or mouth could be burned  You can warm the milk or formula quickly by placing the bottle in a pot of warm water for a few minutes  · Burp your baby during the middle of his or her feeding or after he or she is done  Hold your baby against your shoulder  Put one of your hands under your baby's bottom  Gently rub or pat his or her back with your other hand  You can also sit your baby on your lap with his or her head leaning forward  Support his or her chest and head with your hand  Gently rub or pat his or her back with your other hand  Your baby's neck may not be strong enough to hold his or her head up  Until your baby's neck gets stronger, you must always support his or her head  If your baby's head falls backward, he or she may get a neck injury  · Do not prop a bottle in your baby's mouth or let him or her lie flat during a feeding  Your baby can choke in that position  If your child lies down during a feeding, the milk may also flow into his or her middle ear and cause an infection  What you need to know about peanut allergies:   · Peanut allergies may be prevented by giving young babies peanut products  If your baby has severe eczema or an egg allergy, he or she is at risk for a peanut 3to 10months of age  · A peanut allergy test is not needed if your baby has mild to moderate eczema  Peanut products can be given around 10months of age  Talk to your baby's provider before you give the first taste  · If your baby does not have eczema, talk to his or her provider  He or she may say it is okay to give peanut products at 3to 10months of age  · Do not  give your baby chunky peanut butter or whole peanuts  He or she could choke  Give your baby smooth peanut butter or foods made with peanut butter  Help your baby get physical activity:  Your baby needs physical activity so his or her muscles can develop  Encourage your baby to be active through play  The following are some ways that you can encourage your baby to be active:  · Tomasza Olesya a mobile over your baby's crib  to motivate him or her to reach for it  · Gently turn, roll, bounce, and sway your baby  to help increase muscle strength  Place your baby on your lap, facing you  Hold your baby's hands and help him or her stand  Be sure to support his or her head if he or she cannot hold it steady  · Play with your baby on the floor  Place your baby on his or her tummy  Tummy time helps your baby learn to hold his or her head up  Put a toy just out of his or her reach  This may motivate him or her to roll over as he or she tries to reach it  Other ways to care for your baby:   · Help your baby develop a healthy sleep-wake cycle  Your baby needs sleep to help him or her stay healthy and grow  Create a routine for bedtime  Bathe and feed your baby right before you put him or her to bed  This will help him or her relax and get to sleep easier  Put your baby in his or her crib when he or she is awake but sleepy  · Relieve your baby's teething discomfort with a cold teething ring  Ask your healthcare provider about other ways that you can relieve your baby's teething discomfort  Your baby's first tooth may appear between 3and 6months of age   Some symptoms of teething include drooling, irritability, fussiness, ear rubbing, and sore, tender gums  · Read to your baby  This will comfort your baby and help his or her brain develop  Point to pictures as you read  This will help your baby make connections between pictures and words  Have other family members or caregivers read to your baby  · Do not smoke near your baby  Do not let anyone else smoke near your baby  Do not smoke in your home or vehicle  Smoke from cigarettes or cigars can cause asthma or breathing problems in your baby  · Take an infant CPR and first aid class  These classes will help teach you how to care for your baby in an emergency  Ask your baby's healthcare provider where you can take these classes  Care for yourself during this time:   · Go to all postpartum check-up visits  Your healthcare providers will check your health  Tell them if you have any questions or concerns about your health  They can also help you create or update meal plans  This can help you make sure you are getting enough calories and nutrients, especially if you are breastfeeding  Talk to your providers about an exercise plan  Exercise, such as walking, can help increase your energy levels, improve your mood, and manage your weight  Your providers will tell you how much activity to get each day, and which activities are best for you  · Find time for yourself  Ask a friend, family member, or your partner to watch the baby  Do activities that you enjoy and help you relax  Consider joining a support group with other women who recently had babies if you have not joined one already  It may be helpful to share information about caring for your babies  You can also talk about how you are feeling emotionally and physically  · Talk to your baby's pediatrician about postpartum depression  You may have had screening for postpartum depression during your baby's last well child visit  Screening may also be part of this visit   Screening means your baby's pediatrician will ask if you feel sad, depressed, or very tired  These feelings can be signs of postpartum depression  Tell him or her about any new or worsening problems you or your baby had since your last visit  Also describe anything that makes you feel worse or better  The pediatrician can help you get treatment, such as talk therapy, medicines, or both  What you need to know about your baby's next well child visit:  Your baby's healthcare provider will tell you when to bring your baby in again  The next well child visit is usually at 6 months  Contact your child's healthcare provider if you have questions or concerns about your baby's health or care before the next visit  Your child may need vaccines at the next well child visit  Your provider will tell you which vaccines your baby needs and when your baby should get them  © Copyright [address] Information is for End User's use only and may not be sold, redistributed or otherwise used for commercial purposes  All illustrations and images included in CareNotes® are the copyrighted property of [employer]  or [facility] Veronica Lee   The above information is an  only  It is not intended as medical advice for individual conditions or treatments  Talk to your doctor, nurse or pharmacist before following any medical regimen to see if it is safe and effective for you  IV intact

## 2021-05-18 NOTE — CONSULT NOTE ADULT - CONSULT REASON
Daughter informed that Dolores has a 3pm appt for cardiac clearance today w Dr Gates [facility]   Daughter able to take her   
Great 
I called her daughter, Dr. Gates saw them today and cleared her for surgery.  
COVID-19 infection

## 2021-05-20 ENCOUNTER — APPOINTMENT (OUTPATIENT)
Dept: INTERNAL MEDICINE | Facility: CLINIC | Age: 61
End: 2021-05-20
Payer: MEDICAID

## 2021-05-20 VITALS
OXYGEN SATURATION: 97 % | HEART RATE: 87 BPM | HEIGHT: 66 IN | RESPIRATION RATE: 16 BRPM | SYSTOLIC BLOOD PRESSURE: 118 MMHG | BODY MASS INDEX: 26.03 KG/M2 | DIASTOLIC BLOOD PRESSURE: 70 MMHG | WEIGHT: 162 LBS | TEMPERATURE: 98.8 F

## 2021-05-20 LAB
T3FREE SERPL-MCNC: 2.34 PG/ML
T4 FREE SERPL-MCNC: 1.5 NG/DL
THYROGLOB AB SERPL-ACNC: <20 IU/ML
THYROGLOB SERPL-MCNC: <0.2 NG/ML
TSH SERPL-ACNC: 1.63 UIU/ML

## 2021-05-20 PROCEDURE — 99214 OFFICE O/P EST MOD 30 MIN: CPT

## 2021-05-20 RX ORDER — QUETIAPINE 400 MG/1
TABLET, FILM COATED ORAL
Refills: 0 | Status: DISCONTINUED | COMMUNITY
End: 2021-05-20

## 2021-05-20 NOTE — ASSESSMENT
[FreeTextEntry1] : 1.hypothyroidism: Recent TFTs doing better on levothyroxine 100 mg 6 times a week. Recheck in 3 months. Follow up with endocrinology and ENT.\par \par 2.family history of familial Mediterranean fever: She does not wish to have genetic counseling and will obtain family measuring fever mutation blood tests to see if positive then she will follow up with specialists.

## 2021-05-20 NOTE — HISTORY OF PRESENT ILLNESS
[FreeTextEntry1] : FU [de-identified] : ANDREW STEPHENS is a 61 year F who is being seen today in follow up to discuss blood work results.\par Her TFTs are doing well on levothyroxine 100 mcg 6x/week, still with fatigue. \par Her grand daughter and daughter recently tested positive for MFM. Patient does not have any symptoms but like to have testing done for familial Mediterranean fever. She does note all over body aches and joint pain. She will need to find a new rheumatologist.\par She does have followup with her endocrinologist next month, . \par Patient denies any cp, sob,abdominal pain, nausea, vomiting, palpitations, fever, chills, constipation, diarrhea.\par

## 2021-06-16 ENCOUNTER — EMERGENCY (EMERGENCY)
Facility: HOSPITAL | Age: 61
LOS: 0 days | Discharge: HOME | End: 2021-06-16
Attending: EMERGENCY MEDICINE
Payer: COMMERCIAL

## 2021-06-16 VITALS
SYSTOLIC BLOOD PRESSURE: 132 MMHG | RESPIRATION RATE: 18 BRPM | HEART RATE: 80 BPM | OXYGEN SATURATION: 98 % | DIASTOLIC BLOOD PRESSURE: 81 MMHG | HEIGHT: 66 IN | TEMPERATURE: 98 F

## 2021-06-16 VITALS
HEART RATE: 83 BPM | RESPIRATION RATE: 18 BRPM | SYSTOLIC BLOOD PRESSURE: 134 MMHG | TEMPERATURE: 98 F | DIASTOLIC BLOOD PRESSURE: 90 MMHG | OXYGEN SATURATION: 100 %

## 2021-06-16 DIAGNOSIS — Z79.899 OTHER LONG TERM (CURRENT) DRUG THERAPY: ICD-10-CM

## 2021-06-16 DIAGNOSIS — R07.9 CHEST PAIN, UNSPECIFIED: ICD-10-CM

## 2021-06-16 DIAGNOSIS — Z20.822 CONTACT WITH AND (SUSPECTED) EXPOSURE TO COVID-19: ICD-10-CM
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
## 2021-06-16 DIAGNOSIS — Z79.01 LONG TERM (CURRENT) USE OF ANTICOAGULANTS: ICD-10-CM

## 2021-06-16 DIAGNOSIS — R10.9 UNSPECIFIED ABDOMINAL PAIN: ICD-10-CM

## 2021-06-16 DIAGNOSIS — S32.19XA OTHER FRACTURE OF SACRUM, INITIAL ENCOUNTER FOR CLOSED FRACTURE: ICD-10-CM
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
## 2021-06-16 DIAGNOSIS — E78.5 HYPERLIPIDEMIA, UNSPECIFIED: ICD-10-CM

## 2021-06-16 DIAGNOSIS — Z79.890 HORMONE REPLACEMENT THERAPY: ICD-10-CM

## 2021-06-16 DIAGNOSIS — Z85.850 PERSONAL HISTORY OF MALIGNANT NEOPLASM OF THYROID: ICD-10-CM

## 2021-06-16 DIAGNOSIS — R07.89 OTHER CHEST PAIN: ICD-10-CM

## 2021-06-16 DIAGNOSIS — S30.1XXA CONTUSION OF ABDOMINAL WALL, INITIAL ENCOUNTER: ICD-10-CM

## 2021-06-16 LAB
ALBUMIN SERPL ELPH-MCNC: 3.6 G/DL — SIGNIFICANT CHANGE UP (ref 3.3–5)
ALP SERPL-CCNC: 48 U/L — SIGNIFICANT CHANGE UP (ref 40–120)
ALT FLD-CCNC: 28 U/L — SIGNIFICANT CHANGE UP (ref 12–78)
ANION GAP SERPL CALC-SCNC: 4 MMOL/L — LOW (ref 5–17)
APPEARANCE UR: CLEAR — SIGNIFICANT CHANGE UP
APTT BLD: 29 SEC — SIGNIFICANT CHANGE UP (ref 27.5–35.5)
AST SERPL-CCNC: 24 U/L — SIGNIFICANT CHANGE UP (ref 15–37)
BASOPHILS # BLD AUTO: 0.03 K/UL — SIGNIFICANT CHANGE UP (ref 0–0.2)
BASOPHILS NFR BLD AUTO: 0.5 % — SIGNIFICANT CHANGE UP (ref 0–2)
BILIRUB SERPL-MCNC: 0.4 MG/DL — SIGNIFICANT CHANGE UP (ref 0.2–1.2)
BILIRUB UR-MCNC: NEGATIVE — SIGNIFICANT CHANGE UP
BUN SERPL-MCNC: 25 MG/DL — HIGH (ref 7–23)
CALCIUM SERPL-MCNC: 9.1 MG/DL — SIGNIFICANT CHANGE UP (ref 8.5–10.1)
CHLORIDE SERPL-SCNC: 106 MMOL/L — SIGNIFICANT CHANGE UP (ref 96–108)
CO2 SERPL-SCNC: 29 MMOL/L — SIGNIFICANT CHANGE UP (ref 22–31)
COLOR SPEC: YELLOW — SIGNIFICANT CHANGE UP
CREAT SERPL-MCNC: 0.76 MG/DL — SIGNIFICANT CHANGE UP (ref 0.5–1.3)
DIFF PNL FLD: ABNORMAL
EOSINOPHIL # BLD AUTO: 0.07 K/UL — SIGNIFICANT CHANGE UP (ref 0–0.5)
EOSINOPHIL NFR BLD AUTO: 1.1 % — SIGNIFICANT CHANGE UP (ref 0–6)
GLUCOSE SERPL-MCNC: 128 MG/DL — HIGH (ref 70–99)
GLUCOSE UR QL: NEGATIVE MG/DL — SIGNIFICANT CHANGE UP
HCT VFR BLD CALC: 39 % — SIGNIFICANT CHANGE UP (ref 34.5–45)
HGB BLD-MCNC: 12.4 G/DL — SIGNIFICANT CHANGE UP (ref 11.5–15.5)
IMM GRANULOCYTES NFR BLD AUTO: 0.5 % — SIGNIFICANT CHANGE UP (ref 0–1.5)
INR BLD: 1.09 RATIO — SIGNIFICANT CHANGE UP (ref 0.88–1.16)
KETONES UR-MCNC: ABNORMAL
LEUKOCYTE ESTERASE UR-ACNC: NEGATIVE — SIGNIFICANT CHANGE UP
LIDOCAIN IGE QN: 173 U/L — SIGNIFICANT CHANGE UP (ref 73–393)
LYMPHOCYTES # BLD AUTO: 1.48 K/UL — SIGNIFICANT CHANGE UP (ref 1–3.3)
LYMPHOCYTES # BLD AUTO: 22.5 % — SIGNIFICANT CHANGE UP (ref 13–44)
MCHC RBC-ENTMCNC: 29.1 PG — SIGNIFICANT CHANGE UP (ref 27–34)
MCHC RBC-ENTMCNC: 31.8 GM/DL — LOW (ref 32–36)
MCV RBC AUTO: 91.5 FL — SIGNIFICANT CHANGE UP (ref 80–100)
MONOCYTES # BLD AUTO: 0.44 K/UL — SIGNIFICANT CHANGE UP (ref 0–0.9)
MONOCYTES NFR BLD AUTO: 6.7 % — SIGNIFICANT CHANGE UP (ref 2–14)
NEUTROPHILS # BLD AUTO: 4.54 K/UL — SIGNIFICANT CHANGE UP (ref 1.8–7.4)
NEUTROPHILS NFR BLD AUTO: 68.7 % — SIGNIFICANT CHANGE UP (ref 43–77)
NITRITE UR-MCNC: NEGATIVE — SIGNIFICANT CHANGE UP
PH UR: 7 — SIGNIFICANT CHANGE UP (ref 5–8)
PLATELET # BLD AUTO: 211 K/UL — SIGNIFICANT CHANGE UP (ref 150–400)
POTASSIUM SERPL-MCNC: 3.9 MMOL/L — SIGNIFICANT CHANGE UP (ref 3.5–5.3)
POTASSIUM SERPL-SCNC: 3.9 MMOL/L — SIGNIFICANT CHANGE UP (ref 3.5–5.3)
PROT SERPL-MCNC: 7.2 GM/DL — SIGNIFICANT CHANGE UP (ref 6–8.3)
PROT UR-MCNC: 15 MG/DL
PROTHROM AB SERPL-ACNC: 12.6 SEC — SIGNIFICANT CHANGE UP (ref 10.6–13.6)
RBC # BLD: 4.26 M/UL — SIGNIFICANT CHANGE UP (ref 3.8–5.2)
RBC # FLD: 12.9 % — SIGNIFICANT CHANGE UP (ref 10.3–14.5)
SARS-COV-2 RNA SPEC QL NAA+PROBE: SIGNIFICANT CHANGE UP
SODIUM SERPL-SCNC: 139 MMOL/L — SIGNIFICANT CHANGE UP (ref 135–145)
SP GR SPEC: 1 — LOW (ref 1.01–1.02)
UROBILINOGEN FLD QL: NEGATIVE MG/DL — SIGNIFICANT CHANGE UP
WBC # BLD: 6.59 K/UL — SIGNIFICANT CHANGE UP (ref 3.8–10.5)
WBC # FLD AUTO: 6.59 K/UL — SIGNIFICANT CHANGE UP (ref 3.8–10.5)

## 2021-06-16 PROCEDURE — 80053 COMPREHEN METABOLIC PANEL: CPT

## 2021-06-16 PROCEDURE — 86850 RBC ANTIBODY SCREEN: CPT

## 2021-06-16 PROCEDURE — 93010 ELECTROCARDIOGRAM REPORT: CPT

## 2021-06-16 PROCEDURE — 72170 X-RAY EXAM OF PELVIS: CPT

## 2021-06-16 PROCEDURE — 74177 CT ABD & PELVIS W/CONTRAST: CPT

## 2021-06-16 PROCEDURE — 70450 CT HEAD/BRAIN W/O DYE: CPT | Mod: 26,MA

## 2021-06-16 PROCEDURE — 70450 CT HEAD/BRAIN W/O DYE: CPT

## 2021-06-16 PROCEDURE — 86900 BLOOD TYPING SEROLOGIC ABO: CPT

## 2021-06-16 PROCEDURE — 71045 X-RAY EXAM CHEST 1 VIEW: CPT

## 2021-06-16 PROCEDURE — 72125 CT NECK SPINE W/O DYE: CPT

## 2021-06-16 PROCEDURE — 85730 THROMBOPLASTIN TIME PARTIAL: CPT

## 2021-06-16 PROCEDURE — 71260 CT THORAX DX C+: CPT | Mod: 26,MA

## 2021-06-16 PROCEDURE — 71045 X-RAY EXAM CHEST 1 VIEW: CPT | Mod: 26

## 2021-06-16 PROCEDURE — 72170 X-RAY EXAM OF PELVIS: CPT | Mod: 26

## 2021-06-16 PROCEDURE — 83690 ASSAY OF LIPASE: CPT

## 2021-06-16 PROCEDURE — 99284 EMERGENCY DEPT VISIT MOD MDM: CPT | Mod: 25

## 2021-06-16 PROCEDURE — 81001 URINALYSIS AUTO W/SCOPE: CPT

## 2021-06-16 PROCEDURE — 85610 PROTHROMBIN TIME: CPT

## 2021-06-16 PROCEDURE — 85025 COMPLETE CBC W/AUTO DIFF WBC: CPT

## 2021-06-16 PROCEDURE — 74177 CT ABD & PELVIS W/CONTRAST: CPT | Mod: 26

## 2021-06-16 PROCEDURE — 36415 COLL VENOUS BLD VENIPUNCTURE: CPT

## 2021-06-16 PROCEDURE — 71260 CT THORAX DX C+: CPT

## 2021-06-16 PROCEDURE — 86901 BLOOD TYPING SEROLOGIC RH(D): CPT

## 2021-06-16 PROCEDURE — 93005 ELECTROCARDIOGRAM TRACING: CPT

## 2021-06-16 PROCEDURE — 99284 EMERGENCY DEPT VISIT MOD MDM: CPT

## 2021-06-16 PROCEDURE — 72125 CT NECK SPINE W/O DYE: CPT | Mod: 26,MA

## 2021-06-16 PROCEDURE — 87635 SARS-COV-2 COVID-19 AMP PRB: CPT

## 2021-06-16 RX ORDER — METHOCARBAMOL 500 MG/1
2 TABLET, FILM COATED ORAL
Qty: 30 | Refills: 0
Start: 2021-06-16 | End: 2021-06-20

## 2021-06-16 RX ORDER — MORPHINE SULFATE 50 MG/1
4 CAPSULE, EXTENDED RELEASE ORAL ONCE
Refills: 0 | Status: DISCONTINUED | OUTPATIENT
Start: 2021-06-16 | End: 2021-06-16

## 2021-06-16 RX ORDER — SODIUM CHLORIDE 9 MG/ML
1000 INJECTION INTRAMUSCULAR; INTRAVENOUS; SUBCUTANEOUS ONCE
Refills: 0 | Status: COMPLETED | OUTPATIENT
Start: 2021-06-16 | End: 2021-06-16

## 2021-06-16 RX ORDER — OXYCODONE HYDROCHLORIDE 5 MG/1
1 TABLET ORAL
Qty: 12 | Refills: 0
Start: 2021-06-16 | End: 2021-06-19

## 2021-06-16 RX ORDER — IBUPROFEN 200 MG
1 TABLET ORAL
Qty: 20 | Refills: 0
Start: 2021-06-16 | End: 2021-06-20

## 2021-06-16 RX ADMIN — MORPHINE SULFATE 4 MILLIGRAM(S): 50 CAPSULE, EXTENDED RELEASE ORAL at 10:20

## 2021-06-16 RX ADMIN — SODIUM CHLORIDE 1000 MILLILITER(S): 9 INJECTION INTRAMUSCULAR; INTRAVENOUS; SUBCUTANEOUS at 10:20

## 2021-06-16 NOTE — ED PROVIDER NOTE - PATIENT PORTAL LINK FT
You can access the FollowMyHealth Patient Portal offered by [facility] by registering at the following website: [email or web address]. By joining [employer]’s FollowMyHealth portal, you will also be able to view your health information using other applications (apps) compatible with our system.
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
## 2021-06-16 NOTE — ED PROVIDER NOTE - NS ED ROS FT
Review of Systems:  	•	CONSTITUTIONAL: no fever  	•	SKIN: no rash  	•	RESPIRATORY: no shortness of breath  	•	CARDIAC: (+) chest pain  	•	GI:  (+) abd pain, no nausea, no vomiting, no diarrhea  	•	GENITO-URINARY:  no dysuria  	•	MUSCULOSKELETAL:  no back pain  	•	NEUROLOGIC: no weakness  	•	ALLERGY: no rhinorrhea
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
## 2021-06-16 NOTE — ED ADULT TRIAGE NOTE - DOMESTIC TRAVEL HIGH RISK QUESTION
Patient has some contractions- painful, at least 10x today.  Good fetal movement.  No vaginal bleeding, and no loss of fluid.  Discussed labor precautions.    
No
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
## 2021-06-16 NOTE — ED PROVIDER NOTE - CARE PROVIDER_API CALL
Lg Worley (MD; PhD)  Neurosurgery  [address] [place] [address], [address] [place] [address]  Phone: [phone]  Fax: [phone]  Follow Up Time:     Dilshad Zhu; PhD)  Neurosurgery  [address] [place] [address], [address] [place] [address]  Phone: [phone]  Fax: [phone]  Follow Up Time:    Sekou Valdez)  Orthopaedic Surgery  [address] [place] [address]  Phone: [phone]  Fax: [phone]  Follow Up Time:

## 2021-06-16 NOTE — ED PROVIDER NOTE - PROVIDER TOKENS
PROVIDER:[TOKEN:[[id number]]],PROVIDER:[TOKEN:[[id number]]] PROVIDER:[TOKEN:[[id number]]]

## 2021-06-16 NOTE — ED PROVIDER NOTE - CARE PROVIDERS DIRECT ADDRESSES
[email or web address] ,[email or web address]

## 2021-06-16 NOTE — ED PROVIDER NOTE - PHYSICAL EXAMINATION
*GEN: Moderate distress due to pain, well appearing   *HEAD: Normocephalic, Atraumatic  *EYES/NOSE: b/l Pupils symmetric & Reactive to ligth, EOMI b/l  *THROAT: airway patent, moist mucous membranes  *NECK: Neck supple  *PULMONARY: No Respiratory distress, symmetric b/l chest rise  *CARDIAC: s1s2, regular rhythm   *ABDOMEN:  (+) Right lower quadrant right lateral TTP  *BACK: no CVA tenderness, No midline vertebral tenderness to palpation   *EXTREMITIES: symmetric pulses, 2+ DP & radial pulses, no cyanosis, no edema   *SKIN: contusion to right lower quadrant ,Contusion to right hand, Abrasion to right elbow   *NEUROLOGIC: alert,  full active & passive ROM in all 4 extremities,
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
## 2021-07-09 LAB — MEFV GENE MUT ANL BLD/T: ABNORMAL

## 2021-07-14 ENCOUNTER — APPOINTMENT (OUTPATIENT)
Dept: INTERNAL MEDICINE | Facility: CLINIC | Age: 61
End: 2021-07-14
Payer: MEDICAID

## 2021-07-14 VITALS
DIASTOLIC BLOOD PRESSURE: 78 MMHG | OXYGEN SATURATION: 98 % | HEART RATE: 67 BPM | HEIGHT: 66 IN | WEIGHT: 164 LBS | TEMPERATURE: 97.3 F | BODY MASS INDEX: 26.36 KG/M2 | SYSTOLIC BLOOD PRESSURE: 112 MMHG

## 2021-07-14 DIAGNOSIS — N64.4 MASTODYNIA: ICD-10-CM

## 2021-07-14 DIAGNOSIS — R10.11 RIGHT UPPER QUADRANT PAIN: ICD-10-CM

## 2021-07-14 PROCEDURE — 99214 OFFICE O/P EST MOD 30 MIN: CPT
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
## 2021-07-20 ENCOUNTER — RESULT REVIEW (OUTPATIENT)
Age: 61
End: 2021-07-20

## 2021-07-20 ENCOUNTER — APPOINTMENT (OUTPATIENT)
Dept: ULTRASOUND IMAGING | Facility: CLINIC | Age: 61
End: 2021-07-20
Payer: COMMERCIAL

## 2021-07-20 ENCOUNTER — OUTPATIENT (OUTPATIENT)
Dept: OUTPATIENT SERVICES | Facility: HOSPITAL | Age: 61
LOS: 1 days | End: 2021-07-20
Payer: COMMERCIAL

## 2021-07-20 DIAGNOSIS — N64.4 MASTODYNIA: ICD-10-CM

## 2021-07-20 DIAGNOSIS — R10.11 RIGHT UPPER QUADRANT PAIN: ICD-10-CM

## 2021-07-20 PROCEDURE — 76641 ULTRASOUND BREAST COMPLETE: CPT | Mod: 26,LT

## 2021-07-20 PROCEDURE — 76700 US EXAM ABDOM COMPLETE: CPT | Mod: 26

## 2021-07-20 PROCEDURE — 76641 ULTRASOUND BREAST COMPLETE: CPT

## 2021-07-20 PROCEDURE — 76700 US EXAM ABDOM COMPLETE: CPT

## 2021-07-21 ENCOUNTER — NON-APPOINTMENT (OUTPATIENT)
Age: 61
End: 2021-07-21

## 2021-07-22 ENCOUNTER — APPOINTMENT (OUTPATIENT)
Dept: INTERNAL MEDICINE | Facility: CLINIC | Age: 61
End: 2021-07-22

## 2021-07-26 ENCOUNTER — NON-APPOINTMENT (OUTPATIENT)
Age: 61
End: 2021-07-26

## 2021-09-13 ENCOUNTER — APPOINTMENT (OUTPATIENT)
Dept: INTERNAL MEDICINE | Facility: CLINIC | Age: 61
End: 2021-09-13
Payer: MEDICAID

## 2021-09-13 VITALS
DIASTOLIC BLOOD PRESSURE: 74 MMHG | TEMPERATURE: 98.3 F | WEIGHT: 167 LBS | OXYGEN SATURATION: 96 % | BODY MASS INDEX: 26.84 KG/M2 | SYSTOLIC BLOOD PRESSURE: 130 MMHG | HEIGHT: 66 IN | HEART RATE: 95 BPM

## 2021-09-13 DIAGNOSIS — Z23 ENCOUNTER FOR IMMUNIZATION: ICD-10-CM

## 2021-09-13 DIAGNOSIS — M54.9 DORSALGIA, UNSPECIFIED: ICD-10-CM

## 2021-09-13 LAB
T3FREE SERPL-MCNC: 2.31 PG/ML
THYROGLOB AB SERPL-ACNC: <20 IU/ML
THYROGLOB SERPL-MCNC: <0.2 NG/ML

## 2021-09-13 PROCEDURE — 99214 OFFICE O/P EST MOD 30 MIN: CPT

## 2021-09-13 RX ORDER — OXYBUTYNIN CHLORIDE 5 MG/1
5 TABLET, EXTENDED RELEASE ORAL
Qty: 60 | Refills: 3 | Status: DISCONTINUED | COMMUNITY
Start: 2020-07-22 | End: 2021-09-13

## 2021-09-13 RX ORDER — PANTOPRAZOLE 40 MG/1
40 TABLET, DELAYED RELEASE ORAL DAILY
Refills: 0 | Status: DISCONTINUED | COMMUNITY
End: 2021-09-13

## 2021-09-14 ENCOUNTER — NON-APPOINTMENT (OUTPATIENT)
Age: 61
End: 2021-09-14

## 2021-09-14 LAB
HBV SURFACE AB SERPL IA-ACNC: <3 MIU/ML
M TB IFN-G BLD-IMP: NEGATIVE
MEV IGG FLD QL IA: >300 AU/ML
MEV IGG+IGM SER-IMP: POSITIVE
QUANTIFERON TB PLUS MITOGEN MINUS NIL: >10 IU/ML
QUANTIFERON TB PLUS NIL: 0.05 IU/ML
QUANTIFERON TB PLUS TB1 MINUS NIL: -0.01 IU/ML
QUANTIFERON TB PLUS TB2 MINUS NIL: 0 IU/ML
RUBV IGG FLD-ACNC: 9.2 INDEX
RUBV IGG SER-IMP: POSITIVE
T4 FREE SERPL-MCNC: 1.4 NG/DL
TSH SERPL-ACNC: 11.7 UIU/ML

## 2021-09-14 NOTE — ASSESSMENT
[FreeTextEntry1] : 1.thyroid carcinoma: thyroglobulin low, T3 normal, check TSH and free T4 and follow up with ENT in endocrinology. Continue levothyroxine 100 mcg once daily.\par \par 2.acute back pain with sacral fracture: Continue with physical therapy.\par \par 3.obstructive lung disease: Followup of pulmonary medicine regarding inhaler.\par \par 4.familial Mediterranean fever mutation: Positive. Followup with medical genetics.

## 2021-09-14 NOTE — HISTORY OF PRESENT ILLNESS
[FreeTextEntry1] : FU [de-identified] : ANDREW STEPHENS is a 61 year F who comes in for a follow up visit.\par Pt notes she is still going to PT 3x/week for he sacral fracture/back pain and goes to [facility] and it is really helping her pain and sees [facility] .  She will go to insurance on 9/21/21. \par Her Familial Med Fever Mutation was positive and she is trying to get appointment with  and has one for October TEB. \par For Endocrine she did find a new MD in November through her insurance. Tomorrow she will see ENT Dr.Erneesto Lockett and had bw done last week. She also needs forms for her jobs. \par Patient denies any cp, sob,abdominal pain, nausea, vomiting, palpitations, fever, chills, constipation, diarrhea.\par

## 2021-09-15 ENCOUNTER — APPOINTMENT (OUTPATIENT)
Dept: INTERNAL MEDICINE | Facility: CLINIC | Age: 61
End: 2021-09-15
Payer: MEDICAID

## 2021-09-15 ENCOUNTER — NON-APPOINTMENT (OUTPATIENT)
Age: 61
End: 2021-09-15

## 2021-09-15 ENCOUNTER — MED ADMIN CHARGE (OUTPATIENT)
Age: 61
End: 2021-09-15

## 2021-09-15 DIAGNOSIS — Z11.59 ENCOUNTER FOR SCREENING FOR OTHER VIRAL DISEASES: ICD-10-CM

## 2021-09-15 PROCEDURE — 90746 HEPB VACCINE 3 DOSE ADULT IM: CPT

## 2021-09-15 PROCEDURE — G0010: CPT | Mod: 59

## 2021-09-15 PROCEDURE — 90471 IMMUNIZATION ADMIN: CPT

## 2021-09-17 ENCOUNTER — NON-APPOINTMENT (OUTPATIENT)
Age: 61
End: 2021-09-17

## 2021-09-17 LAB
ALBUMIN SERPL ELPH-MCNC: 4.5 G/DL
ALP BLD-CCNC: 67 U/L
ALT SERPL-CCNC: 21 U/L
AST SERPL-CCNC: 26 U/L
BILIRUB DIRECT SERPL-MCNC: 0.1 MG/DL
BILIRUB INDIRECT SERPL-MCNC: 0.1 MG/DL
BILIRUB SERPL-MCNC: 0.2 MG/DL
PROT SERPL-MCNC: 7.2 G/DL

## 2021-09-29 ENCOUNTER — APPOINTMENT (OUTPATIENT)
Dept: INTERNAL MEDICINE | Facility: CLINIC | Age: 61
End: 2021-09-29
Payer: MEDICAID

## 2021-09-29 DIAGNOSIS — U07.1 COVID-19: ICD-10-CM

## 2021-09-29 PROCEDURE — 99442: CPT

## 2021-10-06 PROBLEM — U07.1 PNEUMONIA DUE TO COVID-19 VIRUS: Status: ACTIVE | Noted: 2020-05-29

## 2021-10-11 ENCOUNTER — NON-APPOINTMENT (OUTPATIENT)
Age: 61
End: 2021-10-11

## 2021-10-11 LAB
AFP-TM SERPL-MCNC: 2.7 NG/ML
BASOPHILS # BLD AUTO: 0.03 K/UL
BASOPHILS NFR BLD AUTO: 0.5 %
COVID-19 NUCLEOCAPSID  GAM ANTIBODY INTERPRETATION: POSITIVE
EOSINOPHIL # BLD AUTO: 0.16 K/UL
EOSINOPHIL NFR BLD AUTO: 2.8 %
HBV DNA # SERPL NAA+PROBE: 103 IU/ML
HCT VFR BLD CALC: 40.8 %
HEPB DNA PCR LOG: 2.01 LOG10IU/ML
HGB BLD-MCNC: 12.9 G/DL
IMM GRANULOCYTES NFR BLD AUTO: 0.2 %
INR PPP: 0.97 RATIO
LYMPHOCYTES # BLD AUTO: 2.33 K/UL
LYMPHOCYTES NFR BLD AUTO: 40.2 %
MAN DIFF?: NORMAL
MCHC RBC-ENTMCNC: 29.3 PG
MCHC RBC-ENTMCNC: 31.6 GM/DL
MCV RBC AUTO: 92.5 FL
MONOCYTES # BLD AUTO: 0.49 K/UL
MONOCYTES NFR BLD AUTO: 8.4 %
NEUTROPHILS # BLD AUTO: 2.78 K/UL
NEUTROPHILS NFR BLD AUTO: 47.9 %
PLATELET # BLD AUTO: 220 K/UL
PT BLD: 11.5 SEC
RBC # BLD: 4.41 M/UL
RBC # FLD: 13.2 %
SARS-COV-2 AB SERPL QL IA: 59.2 INDEX
WBC # FLD AUTO: 5.8 K/UL

## 2021-10-12 ENCOUNTER — APPOINTMENT (OUTPATIENT)
Dept: PEDIATRIC MEDICAL GENETICS | Facility: CLINIC | Age: 61
End: 2021-10-12

## 2021-10-12 ENCOUNTER — NON-APPOINTMENT (OUTPATIENT)
Age: 61
End: 2021-10-12

## 2021-10-14 DIAGNOSIS — Z20.828 CONTACT WITH AND (SUSPECTED) EXPOSURE TO OTHER VIRAL COMMUNICABLE DISEASES: ICD-10-CM

## 2021-10-15 ENCOUNTER — NON-APPOINTMENT (OUTPATIENT)
Age: 61
End: 2021-10-15

## 2021-10-15 LAB — SARS-COV-2 N GENE NPH QL NAA+PROBE: NOT DETECTED

## 2021-10-26 ENCOUNTER — APPOINTMENT (OUTPATIENT)
Dept: INTERNAL MEDICINE | Facility: CLINIC | Age: 61
End: 2021-10-26
Payer: MEDICAID

## 2021-10-26 ENCOUNTER — MED ADMIN CHARGE (OUTPATIENT)
Age: 61
End: 2021-10-26

## 2021-10-26 PROCEDURE — 90746 HEPB VACCINE 3 DOSE ADULT IM: CPT

## 2021-10-26 PROCEDURE — 90471 IMMUNIZATION ADMIN: CPT

## 2021-10-29 ENCOUNTER — APPOINTMENT (OUTPATIENT)
Dept: INTERNAL MEDICINE | Facility: CLINIC | Age: 61
End: 2021-10-29

## 2021-11-04 ENCOUNTER — APPOINTMENT (OUTPATIENT)
Dept: INTERNAL MEDICINE | Facility: CLINIC | Age: 61
End: 2021-11-04
Payer: MEDICAID

## 2021-11-04 PROCEDURE — 99443: CPT

## 2021-11-04 NOTE — HISTORY OF PRESENT ILLNESS
[Home] : at home, [unfilled] , at the time of the visit. [Other Location: e.g. Home (Enter Location, City,State)___] : at [unfilled] [Verbal consent obtained from patient] : the patient, [unfilled] [FreeTextEntry1] : FU [de-identified] : ANDREW STEPHENS is a 61 year F who calls in for a follow up visit.\par She did fill out release of records for Dr.Micheal Hitchcock office,  in [place], [phone]. She has a telephonic appointment with him today at 3pm. She has a history of hepatitis B for which she follows with him for. She notes hepatitis b vaccine history at [place] about 2-3 years ago with 3 vaccine series. She recently restarted hepatitis b vaccines series in Sept and had 2 vaccines. \par She was not successful to make medical genetics  and will do televisit with  in [place]. \par She notes she will see Endocrine on [address] this months.

## 2021-11-04 NOTE — ASSESSMENT
[FreeTextEntry1] : 1.Chronic hepatitis B: inactive at this time. reviewed all recent blood work and [facility] paper work as well. She will follow up with  today via tele visit and review all blood work as well. Per pt no treatment in past and unsure of how contracted. Reviewed recent abdomen US too showing liver WNL and advised I will no longer be ordering testing for this, will need to do so with hepatology. She has signed release forms from .\par \par 2.Thyroid Ca: she will follow with new endocrine doctor in Nov in [place].

## 2021-11-16 ENCOUNTER — INPATIENT (INPATIENT)
Facility: HOSPITAL | Age: 61
LOS: 1 days | Discharge: HOME | DRG: 203 | End: 2021-11-18
Attending: FAMILY MEDICINE | Admitting: HOSPITALIST
Payer: MEDICAID

## 2021-11-16 VITALS — HEIGHT: 66 IN | WEIGHT: 166.01 LBS

## 2021-11-16 DIAGNOSIS — R07.9 CHEST PAIN, UNSPECIFIED: ICD-10-CM

## 2021-11-16 LAB
ALBUMIN SERPL ELPH-MCNC: 3.9 G/DL — SIGNIFICANT CHANGE UP (ref 3.3–5)
ALP SERPL-CCNC: 56 U/L — SIGNIFICANT CHANGE UP (ref 40–120)
ALT FLD-CCNC: 55 U/L — SIGNIFICANT CHANGE UP (ref 12–78)
ANION GAP SERPL CALC-SCNC: 5 MMOL/L — SIGNIFICANT CHANGE UP (ref 5–17)
APPEARANCE UR: CLEAR — SIGNIFICANT CHANGE UP
APTT BLD: 40.8 SEC — HIGH (ref 27.5–35.5)
AST SERPL-CCNC: 42 U/L — HIGH (ref 15–37)
BASOPHILS # BLD AUTO: 0.04 K/UL — SIGNIFICANT CHANGE UP (ref 0–0.2)
BASOPHILS NFR BLD AUTO: 0.6 % — SIGNIFICANT CHANGE UP (ref 0–2)
BILIRUB SERPL-MCNC: 0.2 MG/DL — SIGNIFICANT CHANGE UP (ref 0.2–1.2)
BILIRUB UR-MCNC: NEGATIVE — SIGNIFICANT CHANGE UP
BUN SERPL-MCNC: 13 MG/DL — SIGNIFICANT CHANGE UP (ref 7–23)
CALCIUM SERPL-MCNC: 9.7 MG/DL — SIGNIFICANT CHANGE UP (ref 8.5–10.1)
CHLORIDE SERPL-SCNC: 103 MMOL/L — SIGNIFICANT CHANGE UP (ref 96–108)
CK SERPL-CCNC: 74 U/L — SIGNIFICANT CHANGE UP (ref 26–192)
CO2 SERPL-SCNC: 31 MMOL/L — SIGNIFICANT CHANGE UP (ref 22–31)
COLOR SPEC: YELLOW — SIGNIFICANT CHANGE UP
CREAT SERPL-MCNC: 0.78 MG/DL — SIGNIFICANT CHANGE UP (ref 0.5–1.3)
DIFF PNL FLD: ABNORMAL
EOSINOPHIL # BLD AUTO: 0.14 K/UL — SIGNIFICANT CHANGE UP (ref 0–0.5)
EOSINOPHIL NFR BLD AUTO: 2.2 % — SIGNIFICANT CHANGE UP (ref 0–6)
GLUCOSE SERPL-MCNC: 112 MG/DL — HIGH (ref 70–99)
GLUCOSE UR QL: NEGATIVE MG/DL — SIGNIFICANT CHANGE UP
HCT VFR BLD CALC: 41.4 % — SIGNIFICANT CHANGE UP (ref 34.5–45)
HGB BLD-MCNC: 13.3 G/DL — SIGNIFICANT CHANGE UP (ref 11.5–15.5)
IMM GRANULOCYTES NFR BLD AUTO: 0.2 % — SIGNIFICANT CHANGE UP (ref 0–1.5)
INR BLD: 1.01 RATIO — SIGNIFICANT CHANGE UP (ref 0.88–1.16)
KETONES UR-MCNC: NEGATIVE — SIGNIFICANT CHANGE UP
LEUKOCYTE ESTERASE UR-ACNC: NEGATIVE — SIGNIFICANT CHANGE UP
LYMPHOCYTES # BLD AUTO: 2.65 K/UL — SIGNIFICANT CHANGE UP (ref 1–3.3)
LYMPHOCYTES # BLD AUTO: 40.8 % — SIGNIFICANT CHANGE UP (ref 13–44)
MCHC RBC-ENTMCNC: 29 PG — SIGNIFICANT CHANGE UP (ref 27–34)
MCHC RBC-ENTMCNC: 32.1 GM/DL — SIGNIFICANT CHANGE UP (ref 32–36)
MCV RBC AUTO: 90.2 FL — SIGNIFICANT CHANGE UP (ref 80–100)
MONOCYTES # BLD AUTO: 0.54 K/UL — SIGNIFICANT CHANGE UP (ref 0–0.9)
MONOCYTES NFR BLD AUTO: 8.3 % — SIGNIFICANT CHANGE UP (ref 2–14)
NEUTROPHILS # BLD AUTO: 3.12 K/UL — SIGNIFICANT CHANGE UP (ref 1.8–7.4)
NEUTROPHILS NFR BLD AUTO: 47.9 % — SIGNIFICANT CHANGE UP (ref 43–77)
NITRITE UR-MCNC: NEGATIVE — SIGNIFICANT CHANGE UP
NT-PROBNP SERPL-SCNC: 141 PG/ML — HIGH (ref 0–125)
PH UR: 6.5 — SIGNIFICANT CHANGE UP (ref 5–8)
PLATELET # BLD AUTO: 255 K/UL — SIGNIFICANT CHANGE UP (ref 150–400)
POTASSIUM SERPL-MCNC: 3.6 MMOL/L — SIGNIFICANT CHANGE UP (ref 3.5–5.3)
POTASSIUM SERPL-SCNC: 3.6 MMOL/L — SIGNIFICANT CHANGE UP (ref 3.5–5.3)
PROT SERPL-MCNC: 8.5 GM/DL — HIGH (ref 6–8.3)
PROT UR-MCNC: 15 MG/DL
PROTHROM AB SERPL-ACNC: 11.8 SEC — SIGNIFICANT CHANGE UP (ref 10.6–13.6)
RBC # BLD: 4.59 M/UL — SIGNIFICANT CHANGE UP (ref 3.8–5.2)
RBC # FLD: 12.9 % — SIGNIFICANT CHANGE UP (ref 10.3–14.5)
SARS-COV-2 RNA SPEC QL NAA+PROBE: SIGNIFICANT CHANGE UP
SODIUM SERPL-SCNC: 139 MMOL/L — SIGNIFICANT CHANGE UP (ref 135–145)
SP GR SPEC: 1.01 — SIGNIFICANT CHANGE UP (ref 1.01–1.02)
TROPONIN I, HIGH SENSITIVITY RESULT: 6.72 NG/L — SIGNIFICANT CHANGE UP
TROPONIN I, HIGH SENSITIVITY RESULT: 7.06 NG/L — SIGNIFICANT CHANGE UP
TSH SERPL-MCNC: 1 UU/ML — SIGNIFICANT CHANGE UP (ref 0.34–4.82)
UROBILINOGEN FLD QL: NEGATIVE MG/DL — SIGNIFICANT CHANGE UP
WBC # BLD: 6.5 K/UL — SIGNIFICANT CHANGE UP (ref 3.8–10.5)
WBC # FLD AUTO: 6.5 K/UL — SIGNIFICANT CHANGE UP (ref 3.8–10.5)

## 2021-11-16 PROCEDURE — 85610 PROTHROMBIN TIME: CPT

## 2021-11-16 PROCEDURE — 85027 COMPLETE CBC AUTOMATED: CPT

## 2021-11-16 PROCEDURE — 94640 AIRWAY INHALATION TREATMENT: CPT

## 2021-11-16 PROCEDURE — 99222 1ST HOSP IP/OBS MODERATE 55: CPT

## 2021-11-16 PROCEDURE — 93010 ELECTROCARDIOGRAM REPORT: CPT | Mod: 76

## 2021-11-16 PROCEDURE — 80053 COMPREHEN METABOLIC PANEL: CPT

## 2021-11-16 PROCEDURE — 85025 COMPLETE CBC W/AUTO DIFF WBC: CPT

## 2021-11-16 PROCEDURE — 83735 ASSAY OF MAGNESIUM: CPT

## 2021-11-16 PROCEDURE — 36415 COLL VENOUS BLD VENIPUNCTURE: CPT

## 2021-11-16 PROCEDURE — 84100 ASSAY OF PHOSPHORUS: CPT

## 2021-11-16 PROCEDURE — 71045 X-RAY EXAM CHEST 1 VIEW: CPT | Mod: 26

## 2021-11-16 PROCEDURE — 86769 SARS-COV-2 COVID-19 ANTIBODY: CPT

## 2021-11-16 PROCEDURE — 99285 EMERGENCY DEPT VISIT HI MDM: CPT

## 2021-11-16 PROCEDURE — 80048 BASIC METABOLIC PNL TOTAL CA: CPT

## 2021-11-16 PROCEDURE — 93306 TTE W/DOPPLER COMPLETE: CPT

## 2021-11-16 PROCEDURE — 84484 ASSAY OF TROPONIN QUANT: CPT

## 2021-11-16 PROCEDURE — G0378: CPT

## 2021-11-16 RX ORDER — LEVOTHYROXINE SODIUM 125 MCG
100 TABLET ORAL DAILY
Refills: 0 | Status: DISCONTINUED | OUTPATIENT
Start: 2021-11-16 | End: 2021-11-18

## 2021-11-16 RX ORDER — OMEPRAZOLE 10 MG/1
1 CAPSULE, DELAYED RELEASE ORAL
Qty: 0 | Refills: 0 | DISCHARGE

## 2021-11-16 RX ORDER — CALCIUM CARBONATE 500(1250)
1 TABLET ORAL
Refills: 0 | Status: DISCONTINUED | OUTPATIENT
Start: 2021-11-16 | End: 2021-11-18

## 2021-11-16 RX ORDER — FLUTICASONE PROPIONATE 50 MCG
1 SPRAY, SUSPENSION NASAL
Qty: 0 | Refills: 0 | DISCHARGE

## 2021-11-16 RX ORDER — CHOLECALCIFEROL (VITAMIN D3) 125 MCG
1 CAPSULE ORAL
Qty: 0 | Refills: 0 | DISCHARGE

## 2021-11-16 RX ORDER — PANTOPRAZOLE SODIUM 20 MG/1
40 TABLET, DELAYED RELEASE ORAL
Refills: 0 | Status: DISCONTINUED | OUTPATIENT
Start: 2021-11-16 | End: 2021-11-18

## 2021-11-16 RX ORDER — CALCIUM CARBONATE 500(1250)
1 TABLET ORAL
Qty: 0 | Refills: 0 | DISCHARGE

## 2021-11-16 RX ORDER — CHOLECALCIFEROL (VITAMIN D3) 125 MCG
1000 CAPSULE ORAL DAILY
Refills: 0 | Status: DISCONTINUED | OUTPATIENT
Start: 2021-11-16 | End: 2021-11-18

## 2021-11-16 RX ORDER — ALBUTEROL 90 UG/1
2 AEROSOL, METERED ORAL EVERY 6 HOURS
Refills: 0 | Status: DISCONTINUED | OUTPATIENT
Start: 2021-11-16 | End: 2021-11-18

## 2021-11-16 RX ORDER — LEVOTHYROXINE SODIUM 125 MCG
1 TABLET ORAL
Qty: 0 | Refills: 0 | DISCHARGE

## 2021-11-16 RX ORDER — TOLTERODINE TARTRATE 1 MG/1
1 TABLET, FILM COATED ORAL
Qty: 0 | Refills: 0 | DISCHARGE

## 2021-11-16 RX ORDER — SODIUM CHLORIDE 9 MG/ML
3 INJECTION INTRAMUSCULAR; INTRAVENOUS; SUBCUTANEOUS ONCE
Refills: 0 | Status: COMPLETED | OUTPATIENT
Start: 2021-11-16 | End: 2021-11-16

## 2021-11-16 RX ORDER — OMEGA-3 ACID ETHYL ESTERS 1 G
1 CAPSULE ORAL
Qty: 0 | Refills: 0 | DISCHARGE

## 2021-11-16 RX ORDER — MELOXICAM 15 MG/1
1 TABLET ORAL
Qty: 0 | Refills: 0 | DISCHARGE

## 2021-11-16 RX ORDER — OXYBUTYNIN CHLORIDE 5 MG
5 TABLET ORAL
Refills: 0 | Status: DISCONTINUED | OUTPATIENT
Start: 2021-11-16 | End: 2021-11-18

## 2021-11-16 RX ORDER — FLUTICASONE PROPIONATE 50 MCG
1 SPRAY, SUSPENSION NASAL
Refills: 0 | Status: DISCONTINUED | OUTPATIENT
Start: 2021-11-16 | End: 2021-11-18

## 2021-11-16 RX ORDER — CELECOXIB 200 MG/1
200 CAPSULE ORAL DAILY
Refills: 0 | Status: DISCONTINUED | OUTPATIENT
Start: 2021-11-16 | End: 2021-11-18

## 2021-11-16 RX ADMIN — Medication 5 MILLIGRAM(S): at 23:15

## 2021-11-16 RX ADMIN — Medication 1 SPRAY(S): at 23:15

## 2021-11-16 RX ADMIN — SODIUM CHLORIDE 3 MILLILITER(S): 9 INJECTION INTRAMUSCULAR; INTRAVENOUS; SUBCUTANEOUS at 17:59

## 2021-11-16 RX ADMIN — CELECOXIB 200 MILLIGRAM(S): 200 CAPSULE ORAL at 23:22

## 2021-11-16 RX ADMIN — CELECOXIB 200 MILLIGRAM(S): 200 CAPSULE ORAL at 23:23

## 2021-11-16 NOTE — ED ADULT TRIAGE NOTE - CHIEF COMPLAINT QUOTE
Pt comes to the ED complaining of chest pain and sinus congestion. Pt states that she got her 2nd Moderna Covid vaccine a week ago on 11/9/2021 and has not been feeling well since.

## 2021-11-16 NOTE — H&P ADULT - ASSESSMENT
61 year old female patient with chest pain and ischemic EKG changes      -Admit to Tele        # Chest pain  -pt with new EKG changes  -serial troponin  -serial EKGs  -get morning echo  -Cardiology to evaluate pt    # Hypothyroidism   - on synthroid     #DVT ppx  -scds

## 2021-11-16 NOTE — ED STATDOCS - ENMT, MLM
[Time Spent: ___ minutes] : I have spent [unfilled] minutes of time on the encounter. Nasal mucosa clear.  Mouth with normal mucosa  Throat has no vesicles, no oropharyngeal exudates and uvula is midline.

## 2021-11-16 NOTE — ED STATDOCS - ATTENDING CONTRIBUTION TO CARE
I, Xuan Patterson DO,  performed the initial face to face bedside interview with this patient regarding history of present illness, review of symptoms and relevant past medical, social and family history.  I completed an independent physical examination.  I was the initial provider who evaluated this patient. I have signed out the follow up of any pending tests (i.e. labs, radiological studies) to the ACP.  I have communicated the patient’s plan of care and disposition with the ACP.  The history, relevant review of systems, past medical and surgical history, medical decision making, and physical examination was documented by the scribe in my presence and I attest to the accuracy of the documentation.

## 2021-11-16 NOTE — H&P ADULT - NSHPPHYSICALEXAM_GEN_ALL_CORE
Vital Signs Last 24 Hrs  T(C): 36.6 (16 Nov 2021 15:24), Max: 36.6 (16 Nov 2021 15:24)  T(F): 97.9 (16 Nov 2021 15:24), Max: 97.9 (16 Nov 2021 15:24)  HR: 75 (16 Nov 2021 15:24) (75 - 75)  BP: 145/100 (16 Nov 2021 15:24) (145/100 - 145/100)  BP(mean): 113 (16 Nov 2021 15:24) (113 - 113)  RR: 18 (16 Nov 2021 15:24) (18 - 18)  SpO2: 99% (16 Nov 2021 15:24) (99% - 99%)

## 2021-11-16 NOTE — ED ADULT NURSE NOTE - OBJECTIVE STATEMENT
verbal cues/nonverbal cues (demo/gestures)/1 person assist
pt c/o chest pain since this afternoon. Pt states she feels generalized weakness, chills, fevers, body aches that was gradually improving over past 4 days. States she has pulse ox at home, was down to 90-91%. Also notes blisters inside mouth and nose, can't keep her dentures in. States yesterday she developed hives yesterday, took benadryl with some mild relief. Also notes chest pain in mid chest this afternoon, states pain radiates into left shoulder, pain is sharp and heavy. Pt got 2nd dose of Moderna vaccine last week.

## 2021-11-16 NOTE — PHARMACOTHERAPY INTERVENTION NOTE - COMMENTS
Medication history complete, reviewed medications with patient and confirmed with [facility].  Patient states that she uses an inhaler, she thinks its the ProAir but she's not 100% sure.

## 2021-11-16 NOTE — ED STATDOCS - PROGRESS NOTE DETAILS
61 yr. old female presents to ED with generalized weaknesschills 61 yr. old female presents to ED with generalized weakness,  chills, body aches, for the past 4 days that is gradually improving. Also reports blisters on left corner of mouth , nose and on tongue. Chest pain developed this afternoon radiating to her shoulder. Seen and examined by attending in Intake. Plan: IV, labs, Chest X-ray , Cardiac monitor and re evaluate. Erma FREEDMAN

## 2021-11-16 NOTE — ED STATDOCS - OBJECTIVE STATEMENT
62 y/o female with PMHx of HLD, COVID, thyroid cancer presents to ED c/o chest pain since this afternoon. Pt states she feels generalized weakness, chills, fevers, body aches that was gradually improving over past 4 days. States she has pulse ox at home, was down to 90-91%. Also notes blisters inside mouth and nose, can't keep her dentures in. States yesterday she developed hives yesterday, took benadryl with some mild relief. Also notes chest pain in mid chest this afternoon, states pain radiates into left shoulder, pain is sharp and heavy. Pt got 2nd dose of Moderna vaccine last week. 62 y/o female with PMHx of HLD, COVID, thyroid cancer presents to ED c/o chest pain since this afternoon. Pt states she feels generalized weakness, chills, fevers, body aches that was gradually improving over past 4 days.  Also notes blisters inside mouth and nose, can't keep her dentures in. States yesterday she developed hives yesterday, took benadryl with some mild relief. Also notes chest pain in mid chest this afternoon, states pain radiates into left shoulder, pain is sharp and heavy. Pt got 2nd dose of Moderna vaccine last week.

## 2021-11-16 NOTE — H&P ADULT - HISTORY OF PRESENT ILLNESS
61 year old female patient presented to the ED complaining of a 3-4 day history of substernal chest pain which was initially intermittent but later became constant. Pain initially a heavy pressure but later became a sharp pain that was associated with shortness of breath. No clear aggravating or alleviating symptoms noted Of note, patient had recent Covid booster and has been having flu like symptoms that included subjective fever, chills, myalgias for 3-4 days that lasted until yesterday. Patient denies any known personal or family history of Cardiac disease.        In the ED patient noted to have Ischemic EKG changes in both precordial and limb leads

## 2021-11-17 LAB
ALBUMIN SERPL ELPH-MCNC: 3.3 G/DL — SIGNIFICANT CHANGE UP (ref 3.3–5)
ALP SERPL-CCNC: 49 U/L — SIGNIFICANT CHANGE UP (ref 40–120)
ALT FLD-CCNC: 46 U/L — SIGNIFICANT CHANGE UP (ref 12–78)
ANION GAP SERPL CALC-SCNC: 5 MMOL/L — SIGNIFICANT CHANGE UP (ref 5–17)
AST SERPL-CCNC: 31 U/L — SIGNIFICANT CHANGE UP (ref 15–37)
BASOPHILS # BLD AUTO: 0.03 K/UL — SIGNIFICANT CHANGE UP (ref 0–0.2)
BASOPHILS NFR BLD AUTO: 0.5 % — SIGNIFICANT CHANGE UP (ref 0–2)
BILIRUB SERPL-MCNC: 0.4 MG/DL — SIGNIFICANT CHANGE UP (ref 0.2–1.2)
BUN SERPL-MCNC: 16 MG/DL — SIGNIFICANT CHANGE UP (ref 7–23)
CALCIUM SERPL-MCNC: 9 MG/DL — SIGNIFICANT CHANGE UP (ref 8.5–10.1)
CHLORIDE SERPL-SCNC: 104 MMOL/L — SIGNIFICANT CHANGE UP (ref 96–108)
CO2 SERPL-SCNC: 29 MMOL/L — SIGNIFICANT CHANGE UP (ref 22–31)
COVID-19 NUCLEOCAPSID GAM AB INTERP: POSITIVE
COVID-19 NUCLEOCAPSID TOTAL GAM ANTIBODY RESULT: 64.2 INDEX — HIGH
COVID-19 SPIKE DOMAIN AB INTERP: POSITIVE
COVID-19 SPIKE DOMAIN ANTIBODY RESULT: >250 U/ML — HIGH
CREAT SERPL-MCNC: 0.8 MG/DL — SIGNIFICANT CHANGE UP (ref 0.5–1.3)
EOSINOPHIL # BLD AUTO: 0.13 K/UL — SIGNIFICANT CHANGE UP (ref 0–0.5)
EOSINOPHIL NFR BLD AUTO: 2.4 % — SIGNIFICANT CHANGE UP (ref 0–6)
GLUCOSE SERPL-MCNC: 95 MG/DL — SIGNIFICANT CHANGE UP (ref 70–99)
HCT VFR BLD CALC: 41.2 % — SIGNIFICANT CHANGE UP (ref 34.5–45)
HGB BLD-MCNC: 13.1 G/DL — SIGNIFICANT CHANGE UP (ref 11.5–15.5)
IMM GRANULOCYTES NFR BLD AUTO: 0.2 % — SIGNIFICANT CHANGE UP (ref 0–1.5)
INR BLD: 1.1 RATIO — SIGNIFICANT CHANGE UP (ref 0.88–1.16)
LYMPHOCYTES # BLD AUTO: 2.46 K/UL — SIGNIFICANT CHANGE UP (ref 1–3.3)
LYMPHOCYTES # BLD AUTO: 44.6 % — HIGH (ref 13–44)
MAGNESIUM SERPL-MCNC: 2.5 MG/DL — SIGNIFICANT CHANGE UP (ref 1.6–2.6)
MCHC RBC-ENTMCNC: 28.9 PG — SIGNIFICANT CHANGE UP (ref 27–34)
MCHC RBC-ENTMCNC: 31.8 GM/DL — LOW (ref 32–36)
MCV RBC AUTO: 90.7 FL — SIGNIFICANT CHANGE UP (ref 80–100)
MONOCYTES # BLD AUTO: 0.45 K/UL — SIGNIFICANT CHANGE UP (ref 0–0.9)
MONOCYTES NFR BLD AUTO: 8.2 % — SIGNIFICANT CHANGE UP (ref 2–14)
NEUTROPHILS # BLD AUTO: 2.43 K/UL — SIGNIFICANT CHANGE UP (ref 1.8–7.4)
NEUTROPHILS NFR BLD AUTO: 44.1 % — SIGNIFICANT CHANGE UP (ref 43–77)
PHOSPHATE SERPL-MCNC: 4.3 MG/DL — SIGNIFICANT CHANGE UP (ref 2.5–4.5)
PLATELET # BLD AUTO: 239 K/UL — SIGNIFICANT CHANGE UP (ref 150–400)
POTASSIUM SERPL-MCNC: 4.1 MMOL/L — SIGNIFICANT CHANGE UP (ref 3.5–5.3)
POTASSIUM SERPL-SCNC: 4.1 MMOL/L — SIGNIFICANT CHANGE UP (ref 3.5–5.3)
PROT SERPL-MCNC: 7.3 GM/DL — SIGNIFICANT CHANGE UP (ref 6–8.3)
PROTHROM AB SERPL-ACNC: 12.7 SEC — SIGNIFICANT CHANGE UP (ref 10.6–13.6)
RBC # BLD: 4.54 M/UL — SIGNIFICANT CHANGE UP (ref 3.8–5.2)
RBC # FLD: 12.9 % — SIGNIFICANT CHANGE UP (ref 10.3–14.5)
SARS-COV-2 IGG+IGM SERPL QL IA: 64.2 INDEX — HIGH
SARS-COV-2 IGG+IGM SERPL QL IA: >250 U/ML — HIGH
SARS-COV-2 IGG+IGM SERPL QL IA: POSITIVE
SARS-COV-2 IGG+IGM SERPL QL IA: POSITIVE
SODIUM SERPL-SCNC: 138 MMOL/L — SIGNIFICANT CHANGE UP (ref 135–145)
TROPONIN I, HIGH SENSITIVITY RESULT: 6.27 NG/L — SIGNIFICANT CHANGE UP
WBC # BLD: 5.51 K/UL — SIGNIFICANT CHANGE UP (ref 3.8–10.5)
WBC # FLD AUTO: 5.51 K/UL — SIGNIFICANT CHANGE UP (ref 3.8–10.5)

## 2021-11-17 PROCEDURE — 99233 SBSQ HOSP IP/OBS HIGH 50: CPT

## 2021-11-17 PROCEDURE — 93306 TTE W/DOPPLER COMPLETE: CPT | Mod: 26

## 2021-11-17 RX ADMIN — Medication 1 SPRAY(S): at 10:47

## 2021-11-17 RX ADMIN — Medication 1000 UNIT(S): at 10:46

## 2021-11-17 RX ADMIN — Medication 5 MILLIGRAM(S): at 21:47

## 2021-11-17 RX ADMIN — Medication 5 MILLIGRAM(S): at 10:47

## 2021-11-17 RX ADMIN — Medication 1 SPRAY(S): at 21:46

## 2021-11-17 RX ADMIN — Medication 100 MICROGRAM(S): at 06:56

## 2021-11-17 RX ADMIN — PANTOPRAZOLE SODIUM 40 MILLIGRAM(S): 20 TABLET, DELAYED RELEASE ORAL at 10:47
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
## 2021-11-17 NOTE — CONSULT NOTE ADULT - ASSESSMENT
62 yo F h/o HLD who presents c/o reaction to COVID-19 booster including rash, sores and odd feeling in left side who has been having chronic CP found to have an abnormal EKG.    -EKG appears the same as 2020, ?if EKF from 6/2021 is actually hers or possibly changes due to lead placement but negative HS-troponins, no ectopy on tele, TTE with normal LVSF and no RWMA and symptoms atypical, therefore no concerns for ACS/Acute plaque rupture.  -?CP from GERD. She reports she used to be on PPI but stopped it herself and takes it randomly but not scheduled.  -C/W tx of her rashes/sores  -OK to D/C tele  -Dispo as per primary team. OK to D/C from cardiac standpoint. Can F/U as outpatient if CP continues

## 2021-11-18 ENCOUNTER — TRANSCRIPTION ENCOUNTER (OUTPATIENT)
Age: 61
End: 2021-11-18

## 2021-11-18 VITALS
OXYGEN SATURATION: 99 % | RESPIRATION RATE: 18 BRPM | SYSTOLIC BLOOD PRESSURE: 116 MMHG | TEMPERATURE: 98 F | DIASTOLIC BLOOD PRESSURE: 65 MMHG | HEART RATE: 72 BPM

## 2021-11-18 LAB
ANION GAP SERPL CALC-SCNC: 7 MMOL/L — SIGNIFICANT CHANGE UP (ref 5–17)
BUN SERPL-MCNC: 23 MG/DL — SIGNIFICANT CHANGE UP (ref 7–23)
CALCIUM SERPL-MCNC: 8.9 MG/DL — SIGNIFICANT CHANGE UP (ref 8.5–10.1)
CHLORIDE SERPL-SCNC: 106 MMOL/L — SIGNIFICANT CHANGE UP (ref 96–108)
CO2 SERPL-SCNC: 25 MMOL/L — SIGNIFICANT CHANGE UP (ref 22–31)
CREAT SERPL-MCNC: 0.97 MG/DL — SIGNIFICANT CHANGE UP (ref 0.5–1.3)
CULTURE RESULTS: SIGNIFICANT CHANGE UP
GLUCOSE SERPL-MCNC: 102 MG/DL — HIGH (ref 70–99)
HCT VFR BLD CALC: 36.8 % — SIGNIFICANT CHANGE UP (ref 34.5–45)
HGB BLD-MCNC: 11.8 G/DL — SIGNIFICANT CHANGE UP (ref 11.5–15.5)
MCHC RBC-ENTMCNC: 29.1 PG — SIGNIFICANT CHANGE UP (ref 27–34)
MCHC RBC-ENTMCNC: 32.1 GM/DL — SIGNIFICANT CHANGE UP (ref 32–36)
MCV RBC AUTO: 90.6 FL — SIGNIFICANT CHANGE UP (ref 80–100)
PLATELET # BLD AUTO: 253 K/UL — SIGNIFICANT CHANGE UP (ref 150–400)
POTASSIUM SERPL-MCNC: 4 MMOL/L — SIGNIFICANT CHANGE UP (ref 3.5–5.3)
POTASSIUM SERPL-SCNC: 4 MMOL/L — SIGNIFICANT CHANGE UP (ref 3.5–5.3)
RBC # BLD: 4.06 M/UL — SIGNIFICANT CHANGE UP (ref 3.8–5.2)
RBC # FLD: 12.9 % — SIGNIFICANT CHANGE UP (ref 10.3–14.5)
SODIUM SERPL-SCNC: 138 MMOL/L — SIGNIFICANT CHANGE UP (ref 135–145)
SPECIMEN SOURCE: SIGNIFICANT CHANGE UP
WBC # BLD: 7.33 K/UL — SIGNIFICANT CHANGE UP (ref 3.8–10.5)
WBC # FLD AUTO: 7.33 K/UL — SIGNIFICANT CHANGE UP (ref 3.8–10.5)

## 2021-11-18 PROCEDURE — 99238 HOSP IP/OBS DSCHRG MGMT 30/<: CPT

## 2021-11-18 RX ORDER — CX-024414 0.2 MG/ML
0.5 INJECTION, SUSPENSION INTRAMUSCULAR
Qty: 0 | Refills: 0 | DISCHARGE

## 2021-11-18 RX ORDER — ACETAMINOPHEN 500 MG
650 TABLET ORAL ONCE
Refills: 0 | Status: COMPLETED | OUTPATIENT
Start: 2021-11-18 | End: 2021-11-18

## 2021-11-18 RX ADMIN — Medication 1 SPRAY(S): at 08:44

## 2021-11-18 RX ADMIN — Medication 5 MILLIGRAM(S): at 08:43

## 2021-11-18 RX ADMIN — PANTOPRAZOLE SODIUM 40 MILLIGRAM(S): 20 TABLET, DELAYED RELEASE ORAL at 08:48

## 2021-11-18 RX ADMIN — Medication 1000 UNIT(S): at 08:43

## 2021-11-18 RX ADMIN — Medication 650 MILLIGRAM(S): at 01:24

## 2021-11-18 RX ADMIN — Medication 100 MICROGRAM(S): at 05:36

## 2021-11-18 RX ADMIN — Medication 650 MILLIGRAM(S): at 01:40

## 2021-11-18 NOTE — DISCHARGE NOTE PROVIDER - CARE PROVIDER_API CALL
Lisbeth Acuna (DO)  Cardiovascular Disease; Internal Medicine  [address], [address]  [place] [address]  Phone: [phone]  Fax: [phone]  Follow Up Time:

## 2021-11-18 NOTE — DISCHARGE NOTE NURSING/CASE MANAGEMENT/SOCIAL WORK - PATIENT PORTAL LINK FT
You can access the FollowMyHealth Patient Portal offered by [facility] by registering at the following website: [email or web address]. By joining [employer]’s FollowMyHealth portal, you will also be able to view your health information using other applications (apps) compatible with our system.

## 2021-11-18 NOTE — DISCHARGE NOTE PROVIDER - NSDCMRMEDTOKEN_GEN_ALL_CORE_FT
Caltrate 600 mg oral tablet: 1 tab(s) orally once a day  Fish Oil oral capsule: 1 cap(s) orally once a day  Flonase 50 mcg/inh nasal spray: 1 spray(s) in each nostril once a day, As Needed  levothyroxine 100 mcg (0.1 mg) oral tablet: 1 tab(s) orally once a day  meloxicam 15 mg oral tablet: 1 tab(s) orally once a day, As Needed  omeprazole 40 mg oral delayed release capsule: 1 cap(s) orally once a day, As Needed  ProAir HFA 90 mcg/inh inhalation aerosol: 2 puff(s) inhaled every 6 hours, As Needed  -for bronchospasm   tolterodine 2 mg oral tablet: 1 tab(s) orally once a day  Vitamin D3 25 mcg (1000 intl units) oral tablet: 1 tab(s) orally once a day
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
## 2021-11-18 NOTE — DISCHARGE NOTE PROVIDER - PROVIDER TOKENS
Patient replied to the [employer] [facility] message I sent to him yesterday asking if he needs to set up a pre-op with his PCP?    I replied that no, this must be done with PAC, and his pre-op appointments are scheduled on 8/18, and detailed in the Surgery Packet sent to him via [employer] [facility] (the message he replied to), so to please read through the packet, and let me know if the new dates/times work for him. I scheduled them within 4 days before surgery so he could do them all, including the COVID test, on the same date.    I also tried calling patient again, but again reached a message that the wireless caller is unavailable right now, so I was unable to leave a [facility].  
PROVIDER:[TOKEN:[[id number]]]

## 2021-11-18 NOTE — DISCHARGE NOTE PROVIDER - HOSPITAL COURSE
61 year old female patient with chest pain and ischemic EKG changes    # Chest pain  -serial troponin negative  -serial EKG unchanged  -Echo stable  -Cardiology consult appreciated  - Patient to follow up with Lisbeth Acuna for outpatient stress test    # Hypothyroidism   - on synthroid, continue.
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
## 2021-11-19 ENCOUNTER — NON-APPOINTMENT (OUTPATIENT)
Age: 61
End: 2021-11-19

## 2021-11-24 ENCOUNTER — APPOINTMENT (OUTPATIENT)
Dept: INTERNAL MEDICINE | Facility: CLINIC | Age: 61
End: 2021-11-24

## 2021-11-24 DIAGNOSIS — M94.0 CHONDROCOSTAL JUNCTION SYNDROME [TIETZE]: ICD-10-CM

## 2021-11-24 DIAGNOSIS — R07.9 CHEST PAIN, UNSPECIFIED: ICD-10-CM

## 2021-11-24 DIAGNOSIS — K13.79 OTHER LESIONS OF ORAL MUCOSA: ICD-10-CM

## 2021-11-24 DIAGNOSIS — E03.9 HYPOTHYROIDISM, UNSPECIFIED: ICD-10-CM

## 2021-11-24 DIAGNOSIS — C73 MALIGNANT NEOPLASM OF THYROID GLAND: ICD-10-CM

## 2021-11-24 DIAGNOSIS — R94.31 ABNORMAL ELECTROCARDIOGRAM [ECG] [EKG]: ICD-10-CM

## 2021-11-24 DIAGNOSIS — K21.9 GASTRO-ESOPHAGEAL REFLUX DISEASE WITHOUT ESOPHAGITIS: ICD-10-CM

## 2021-11-30 ENCOUNTER — APPOINTMENT (OUTPATIENT)
Dept: INTERNAL MEDICINE | Facility: CLINIC | Age: 61
End: 2021-11-30
Payer: MEDICAID

## 2021-11-30 VITALS
TEMPERATURE: 97.8 F | HEART RATE: 88 BPM | HEIGHT: 66 IN | SYSTOLIC BLOOD PRESSURE: 126 MMHG | WEIGHT: 166 LBS | DIASTOLIC BLOOD PRESSURE: 84 MMHG | OXYGEN SATURATION: 95 % | BODY MASS INDEX: 26.68 KG/M2

## 2021-11-30 DIAGNOSIS — L29.9 PRURITUS, UNSPECIFIED: ICD-10-CM

## 2021-11-30 DIAGNOSIS — M94.0 CHONDROCOSTAL JUNCTION SYNDROME [TIETZE]: ICD-10-CM

## 2021-11-30 DIAGNOSIS — B00.9 HERPESVIRAL INFECTION, UNSPECIFIED: ICD-10-CM

## 2021-11-30 PROCEDURE — 99495 TRANSJ CARE MGMT MOD F2F 14D: CPT

## 2021-11-30 RX ORDER — CALCIUM CARBONATE/VITAMIN D3 600 MG-20
TABLET ORAL
Refills: 0 | Status: ACTIVE | COMMUNITY

## 2021-11-30 RX ORDER — ALBUTEROL SULFATE 90 UG/1
108 (90 BASE) INHALANT RESPIRATORY (INHALATION)
Qty: 1 | Refills: 0 | Status: ACTIVE | COMMUNITY
Start: 1900-01-01 | End: 1900-01-01

## 2021-11-30 RX ORDER — DIPHENHYDRAMINE HCL 25 MG/1
25 TABLET ORAL
Qty: 30 | Refills: 3 | Status: ACTIVE | COMMUNITY
Start: 2021-11-30 | End: 1900-01-01

## 2021-11-30 RX ORDER — ASPIRIN 81 MG
81 TABLET, DELAYED RELEASE (ENTERIC COATED) ORAL
Refills: 0 | Status: ACTIVE | COMMUNITY

## 2021-11-30 RX ORDER — FLUTICASONE PROPIONATE 50 MCG
50 SPRAY, SUSPENSION NASAL
Refills: 0 | Status: ACTIVE | COMMUNITY

## 2021-11-30 RX ORDER — PSYLLIUM HUSK 0.4 G
CAPSULE ORAL
Refills: 0 | Status: ACTIVE | COMMUNITY

## 2021-11-30 RX ORDER — MULTIVIT,IRON,MINERALS/LUTEIN
TABLET ORAL
Refills: 0 | Status: ACTIVE | COMMUNITY

## 2021-11-30 NOTE — HISTORY OF PRESENT ILLNESS
[Post-hospitalization from ___ Hospital] : Post-hospitalization from [unfilled] Hospital [Admitted on: ___] : The patient was admitted on [unfilled] [Discharged on ___] : discharged on [unfilled] [Discharge Summary] : discharge summary [Pertinent Labs] : pertinent labs [Radiology Findings] : radiology findings [Discharge Med List] : discharge medication list [FreeTextEntry2] : Ms. ANDREW STEPHENS is a 61 year F who comes in for a hospital follow up visit.\par Pt had 2nd dose of Moderna vaccine on 11/9/21 and then had numbness/tingling of left arm, itching and chest pain and was referred to ED. Pt had new EKG changes compared to 6/21 and admitted to r/o ACS. Pt had negative troponin and Echo stable. Pt saw Dr.Leah Acuna this morning and had stable EKG changes today and they will need to schedule stress test soon but earliest is 4/2022. \par Pt notes b/l leg muscle cramps and is wondering if they will improve with Mg supplementation. \par She continues to have itching/rash in random places, on chest/hand/wrist that resolves with Benadryl cream. \par She is having more belching and epigastric discomfort. She notes associated epigastric pain on-and-off and fullness sensation. She denies any nausea or vomiting, black or bloody stable or any weight loss.

## 2021-11-30 NOTE — ASSESSMENT
[FreeTextEntry1] : 1.chest pain with EKG changes: EKG and serial troponins negative in the office and followed up with cardiology and had EKG this morning with Dr. Acuna. She'll need further workup with PVR and stress test, she will be given cardiology referral at this time.\par \par 2.epigastric abdominal pain: Likely related to GERD with belching. Discussed dietary changes to make in to start on omeprazole 20 mg once daily as previously taking it only as needed.\par \par 3.thyroid carcinoma status post thyroidectomy with hypothyroidism: Recent TSH in the hospital stable. Previous thyroid ultrasound and labs from September presented as patient will followup with endocrinology next week. Continue on levothyroxine 100 mcg once daily.\par \par 4.HSV one infection/cold sore: Given acyclovir cream to use as needed.\par \par 5.health maintenance: Per patient notes side effects to Moderna vaccine-given prescription for Bentyl to take as needed. She also has bilateral muscle cramps of the legs and given prescription for magnesium 400 mg once daily. She request refill of Proair inhaler as well.

## 2021-12-07 ENCOUNTER — APPOINTMENT (OUTPATIENT)
Dept: ENDOCRINOLOGY | Facility: CLINIC | Age: 61
End: 2021-12-07
Payer: MEDICAID

## 2021-12-07 VITALS
TEMPERATURE: 98.1 F | SYSTOLIC BLOOD PRESSURE: 134 MMHG | HEIGHT: 66 IN | OXYGEN SATURATION: 97 % | BODY MASS INDEX: 26.84 KG/M2 | RESPIRATION RATE: 15 BRPM | DIASTOLIC BLOOD PRESSURE: 84 MMHG | WEIGHT: 167 LBS | HEART RATE: 93 BPM

## 2021-12-07 PROCEDURE — 99203 OFFICE O/P NEW LOW 30 MIN: CPT

## 2021-12-07 NOTE — ASSESSMENT
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

## 2021-12-07 NOTE — PHYSICAL EXAM
[Alert] : alert [PERRL] : pupils equal, round and reactive to light [No Proptosis] : no proptosis [No Neck Mass] : no neck mass was observed [Thyroid Not Enlarged] : the thyroid was not enlarged [No Thyroid Nodules] : no palpable thyroid nodules [Well Healed Scar] : well healed scar [No Respiratory Distress] : no respiratory distress [Clear to Auscultation] : lungs were clear to auscultation bilaterally [Normal Rate] : heart rate was normal [Soft] : abdomen soft [No Joint Swelling] : no joint swelling seen [No Rash] : no rash [Oriented x3] : oriented to person, place, and time [Normal Insight/Judgement] : insight and judgment were intact [Kyphosis] : no kyphosis present
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
## 2021-12-07 NOTE — HISTORY OF PRESENT ILLNESS
[FreeTextEntry1] : 61 yoF with a hx of Thyroid Ca- surgery in 2011 and 2014- Thyroid Ca had spread to lymph nodes\par She also had I 131 in 2011 and 2014\par On Levothyroxine 100mcg qd\par \par FH- neg for thyroid disese\par

## 2021-12-07 NOTE — REASON FOR VISIT
[Consultation] : a consultation visit [Hypothyroidism] : hypothyroidism [Thyroid Cancer] : thyroid cancer

## 2021-12-12 ENCOUNTER — OUTPATIENT (OUTPATIENT)
Dept: OUTPATIENT SERVICES | Facility: HOSPITAL | Age: 61
LOS: 1 days | End: 2021-12-12
Payer: MEDICAID

## 2021-12-12 ENCOUNTER — APPOINTMENT (OUTPATIENT)
Dept: ULTRASOUND IMAGING | Facility: CLINIC | Age: 61
End: 2021-12-12
Payer: MEDICAID

## 2021-12-12 DIAGNOSIS — Z00.8 ENCOUNTER FOR OTHER GENERAL EXAMINATION: ICD-10-CM

## 2021-12-12 DIAGNOSIS — C73 MALIGNANT NEOPLASM OF THYROID GLAND: ICD-10-CM

## 2021-12-12 PROCEDURE — 76536 US EXAM OF HEAD AND NECK: CPT

## 2021-12-12 PROCEDURE — 76536 US EXAM OF HEAD AND NECK: CPT | Mod: 26

## 2021-12-14 ENCOUNTER — NON-APPOINTMENT (OUTPATIENT)
Age: 61
End: 2021-12-14

## 2021-12-14 ENCOUNTER — APPOINTMENT (OUTPATIENT)
Dept: CARDIOLOGY | Facility: CLINIC | Age: 61
End: 2021-12-14
Payer: MEDICAID

## 2021-12-14 VITALS
DIASTOLIC BLOOD PRESSURE: 76 MMHG | BODY MASS INDEX: 26.68 KG/M2 | OXYGEN SATURATION: 96 % | HEART RATE: 78 BPM | WEIGHT: 166 LBS | SYSTOLIC BLOOD PRESSURE: 116 MMHG | HEIGHT: 66 IN

## 2021-12-14 LAB
T4 FREE SERPL-MCNC: 1.7 NG/DL
THYROGLOB AB SERPL-ACNC: <20 IU/ML
THYROGLOB SERPL-MCNC: <0.2 NG/ML
TSH SERPL-ACNC: 1.12 UIU/ML

## 2021-12-14 PROCEDURE — 93000 ELECTROCARDIOGRAM COMPLETE: CPT

## 2021-12-14 PROCEDURE — 99205 OFFICE O/P NEW HI 60 MIN: CPT

## 2021-12-14 NOTE — ASSESSMENT
[FreeTextEntry1] : A/P:\par \par *chest pain-atypical.\par -exercise nuclear stress test\par -Echo\par \par *leg pain\par -KIKI\par \par Return in 2-3 weeks to review results.

## 2021-12-14 NOTE — REASON FOR VISIT
[FreeTextEntry1] : 61\par \par *no prior cardiac history.\par *GERD, thyroid cancer-s/p surgery '11,'14-on thyroid supp\par \par intermittent chest pain for months.\par substernal gradually worsening, also pain in back\par Duration 5-30 minutes.  Sometimes severe must sit down for it to resolve.\par Feels like something is stuck in throat, also burning.\par 2-3 times a week. No relation to activity. \par \par Exercises go PT 3x a week now 2x does not come on.\par Also w/ heaviness in chest w/ walking up stairs \par different than what is described above.\par No changes w/ eating, unclear if position, inspiration.\par No nausea, vomiting, diaphoresis.\par No increase in frequency or severity.  sometimes mild dyspnea if strong.\par No palpitations, lightheadness, syncope.\par \par seen by Dr. Acuna at  after presented to ED w/ chest pain.\par Discharged, saw pt as OP in clinic.  Stress test & KIKI\par ordered. placed on wait list for stress test, planned for April '22.\par \par No family hx cardiac disease -\par cousin- w/ heart problem\par \par no smoking, no ETOH, no illicits\par Worked as home health aid, currently unemployed.\par was on O2 after recovering from COVID infection chronically\par until recently.\par \par reports cramping in legs\par sometimes worse w/ exertion\par also in middle of night.\par if walks longer than normal then feels more discomfort at night.

## 2021-12-22 ENCOUNTER — APPOINTMENT (OUTPATIENT)
Dept: CARDIOLOGY | Facility: CLINIC | Age: 61
End: 2021-12-22
Payer: MEDICAID

## 2021-12-22 PROCEDURE — 93306 TTE W/DOPPLER COMPLETE: CPT

## 2021-12-22 PROCEDURE — 93922 UPR/L XTREMITY ART 2 LEVELS: CPT

## 2021-12-26 ENCOUNTER — RX RENEWAL (OUTPATIENT)
Age: 61
End: 2021-12-26

## 2022-01-24 ENCOUNTER — RX RENEWAL (OUTPATIENT)
Age: 62
End: 2022-01-24

## 2022-02-10 NOTE — H&P ADULT - BIRTH SEX
Called scheduling and let them know CT Order was corrected in system. They will reach out to patient to schedule. Female

## 2022-03-02 ENCOUNTER — NON-APPOINTMENT (OUTPATIENT)
Age: 62
End: 2022-03-02

## 2022-03-02 LAB
SARS-COV-2 N GENE NPH QL NAA+PROBE: NOT DETECTED
T4 FREE SERPL-MCNC: 1.8 NG/DL
TSH SERPL-ACNC: 0.41 UIU/ML

## 2022-03-08 ENCOUNTER — APPOINTMENT (OUTPATIENT)
Dept: ENDOCRINOLOGY | Facility: CLINIC | Age: 62
End: 2022-03-08
Payer: MEDICAID

## 2022-03-08 VITALS
HEIGHT: 66 IN | DIASTOLIC BLOOD PRESSURE: 78 MMHG | TEMPERATURE: 97.9 F | BODY MASS INDEX: 26.52 KG/M2 | HEART RATE: 72 BPM | WEIGHT: 165 LBS | SYSTOLIC BLOOD PRESSURE: 110 MMHG | RESPIRATION RATE: 16 BRPM | OXYGEN SATURATION: 96 %

## 2022-03-08 PROCEDURE — 99214 OFFICE O/P EST MOD 30 MIN: CPT

## 2022-03-08 RX ORDER — LEVOTHYROXINE SODIUM 0.1 MG/1
100 TABLET ORAL
Refills: 0 | Status: DISCONTINUED | COMMUNITY
End: 2022-03-08
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
## 2022-03-08 NOTE — PHYSICAL EXAM
[Alert] : alert [No Acute Distress] : no acute distress [PERRL] : pupils equal, round and reactive to light [No Proptosis] : no proptosis [No Neck Mass] : no neck mass was observed [Thyroid Not Enlarged] : the thyroid was not enlarged [No Thyroid Nodules] : no palpable thyroid nodules [Well Healed Scar] : well healed scar [No Respiratory Distress] : no respiratory distress [Clear to Auscultation] : lungs were clear to auscultation bilaterally [Normal Rate] : heart rate was normal [Soft] : abdomen soft [No Joint Swelling] : no joint swelling seen [No Rash] : no rash [Oriented x3] : oriented to person, place, and time [Normal Insight/Judgement] : insight and judgment were intact [Kyphosis] : no kyphosis present

## 2022-03-08 NOTE — HISTORY OF PRESENT ILLNESS
[FreeTextEntry1] : 61 yoF with a hx of Thyroid Ca- surgery in 2011 and 2014- Thyroid Ca had spread to lymph nodes\par She also had I 131 in 2011 and 2014\par On Levothyroxine 100mcg  6x/wk and 1.5 tabs 1x/wk\par \par FH- neg for thyroid disese\par

## 2022-03-08 NOTE — ASSESSMENT
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

## 2022-03-11 ENCOUNTER — APPOINTMENT (OUTPATIENT)
Dept: INTERNAL MEDICINE | Facility: CLINIC | Age: 62
End: 2022-03-11
Payer: MEDICAID

## 2022-03-11 VITALS
BODY MASS INDEX: 26.2 KG/M2 | TEMPERATURE: 97.9 F | HEART RATE: 91 BPM | SYSTOLIC BLOOD PRESSURE: 128 MMHG | OXYGEN SATURATION: 98 % | DIASTOLIC BLOOD PRESSURE: 74 MMHG | WEIGHT: 163 LBS | HEIGHT: 66 IN

## 2022-03-11 DIAGNOSIS — M79.669 PAIN IN UNSPECIFIED LOWER LEG: ICD-10-CM

## 2022-03-11 DIAGNOSIS — R07.89 OTHER CHEST PAIN: ICD-10-CM

## 2022-03-11 PROCEDURE — 99214 OFFICE O/P EST MOD 30 MIN: CPT

## 2022-03-12 LAB
25(OH)D3 SERPL-MCNC: 41.3 NG/ML
URATE SERPL-MCNC: 4.4 MG/DL

## 2022-03-14 ENCOUNTER — NON-APPOINTMENT (OUTPATIENT)
Age: 62
End: 2022-03-14

## 2022-03-14 NOTE — HISTORY OF PRESENT ILLNESS
[FreeTextEntry1] : FU [de-identified] : ANDREW STEPHENS is a 62 year F who comes in for a follow up visit.\par Pt did see cardiology and had echo and KIKI done but NST denied by insurance. She continues to have some on/off chest pain, epigastric that radiates up to her throat, burning sensation. \par She continues to have choking sensation that can occur randomly or sometimes with foods. No dysphagia or odynophagia. She did have this sx after her thyroid surgery but is occurring more frequently. She did have upper endoscopy by head/neck oncologist in 2014 and was normal at that time. She will see  head/neck in about a month. \par She note b/l leg pain from knees down, she also note some foot pain and bruise on right foot. She wonders about dx of gout. \par For her hx of FMF heterozygous positive gene she is scheduled to have tele visit with Dr.Wayne Cisneros [facility] Medical Geneticist. \par Patient denies any cp, sob,abdominal pain, nausea, vomiting, palpitations, fever, chills, constipation, diarrhea.\par \par

## 2022-03-14 NOTE — ASSESSMENT
[FreeTextEntry1] : 1.FMF heterozygous positive: f/u with Dr.Wayne Cisneros, [facility] medical geneticist on 3/16/22 tele visit. \par \par 2. Joint pain/arthralgia: check uric acid to r/o gout, f/u with rheumatology. \par \par 3.CP: f/u with cardio on results of echo and KIKI, f/u on Stress Test. make appt asap.\par \par 4.GERD: with increase in sx, advised GERD diet, not to wear tight clothing. increase omeprazole 40 mg to bid and f/u with head/neck sx  for upper endoscopy repeat.

## 2022-03-18 ENCOUNTER — NON-APPOINTMENT (OUTPATIENT)
Age: 62
End: 2022-03-18

## 2022-04-20 ENCOUNTER — RX RENEWAL (OUTPATIENT)
Age: 62
End: 2022-04-20

## 2022-05-03 NOTE — ED PROVIDER NOTE - PRINCIPAL DIAGNOSIS
Sacral fracture

## 2022-06-14 ENCOUNTER — APPOINTMENT (OUTPATIENT)
Dept: ENDOCRINOLOGY | Facility: CLINIC | Age: 62
End: 2022-06-14
Payer: MEDICAID

## 2022-06-14 VITALS
WEIGHT: 158 LBS | HEART RATE: 82 BPM | SYSTOLIC BLOOD PRESSURE: 108 MMHG | TEMPERATURE: 98.1 F | DIASTOLIC BLOOD PRESSURE: 72 MMHG | RESPIRATION RATE: 16 BRPM | BODY MASS INDEX: 25.39 KG/M2 | HEIGHT: 66 IN | OXYGEN SATURATION: 99 %

## 2022-06-14 LAB
T4 FREE SERPL-MCNC: 1.7 NG/DL
T4BG SERPL-MCNC: 21 UG/ML
TSH SERPL-ACNC: 0.05 UIU/ML

## 2022-06-14 PROCEDURE — 99214 OFFICE O/P EST MOD 30 MIN: CPT

## 2022-06-14 RX ORDER — COVID-19 ANTIGEN TEST
KIT MISCELLANEOUS
Qty: 2 | Refills: 0 | Status: COMPLETED | COMMUNITY
Start: 2022-03-01 | End: 2022-06-14

## 2022-06-14 RX ORDER — LEVOTHYROXINE SODIUM 0.1 MG/1
100 TABLET ORAL DAILY
Qty: 90 | Refills: 0 | Status: DISCONTINUED | COMMUNITY
Start: 2021-12-26 | End: 2022-06-14

## 2022-06-14 NOTE — ASSESSMENT
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
## 2022-06-14 NOTE — HISTORY OF PRESENT ILLNESS
[FreeTextEntry1] : 61 yoF with a hx of Thyroid Ca- surgery in 2011 and 2014- Thyroid Ca had spread to lymph nodes\par She also had I 131 in 2011 and 2014\par On Levothyroxine 112 mcg qd\par \par FH- neg for thyroid disese\par No lumps in neck\par sees surgeon\par

## 2022-06-16 ENCOUNTER — APPOINTMENT (OUTPATIENT)
Dept: INTERNAL MEDICINE | Facility: CLINIC | Age: 62
End: 2022-06-16
Payer: MEDICAID

## 2022-06-16 VITALS
HEART RATE: 85 BPM | TEMPERATURE: 97.9 F | HEIGHT: 66 IN | WEIGHT: 156 LBS | BODY MASS INDEX: 25.07 KG/M2 | SYSTOLIC BLOOD PRESSURE: 116 MMHG | OXYGEN SATURATION: 99 % | DIASTOLIC BLOOD PRESSURE: 74 MMHG

## 2022-06-16 DIAGNOSIS — N32.81 OVERACTIVE BLADDER: ICD-10-CM

## 2022-06-16 PROCEDURE — 99214 OFFICE O/P EST MOD 30 MIN: CPT

## 2022-06-17 NOTE — HISTORY OF PRESENT ILLNESS
[FreeTextEntry1] : FU [de-identified] : ANDREW STEPHENS is a 62 year F who comes in for a follow up visit.\par Pt states she feels better with colchicine for FMF and has had milder attacks of low grade temp/fever, low back pain and leg pain, 3 of them in last 3 mo. She has an apt with Dr.Terry Fields with [facility] on 6/30/2022.\par She also would like to change hepatologist to local as well as see local ENT for neck and throat swelling.\par Patient denies any cp, sob,abdominal pain, nausea, vomiting, palpitations, fever, chills, constipation, diarrhea.\par

## 2022-06-17 NOTE — ASSESSMENT
[FreeTextEntry1] : 1.familial Mediterranean fever: We will reach out to  regarding increasing patient's colchicine from 1.2 mg to 1.8 mg.  We will also see if there is a local FMF specialist for patient to follow-up with.\par \par 2.chronic hepatitis C: Given referral to local hepatologist for follow-up.\par \par 3.throat neck swelling with history of Hashimoto thyroiditis: Recent endocrinology notes reviewed and continue on current dose of medication, given referral to ENT.

## 2022-06-20 LAB
ALBUMIN SERPL ELPH-MCNC: 4.4 G/DL
ALP BLD-CCNC: 49 U/L
ALT SERPL-CCNC: 15 U/L
ANION GAP SERPL CALC-SCNC: 9 MMOL/L
AST SERPL-CCNC: 18 U/L
BASOPHILS # BLD AUTO: 0.03 K/UL
BASOPHILS NFR BLD AUTO: 0.6 %
BILIRUB SERPL-MCNC: 0.4 MG/DL
BUN SERPL-MCNC: 23 MG/DL
CALCIUM SERPL-MCNC: 9.8 MG/DL
CHLORIDE SERPL-SCNC: 106 MMOL/L
CHOLEST SERPL-MCNC: 211 MG/DL
CO2 SERPL-SCNC: 28 MMOL/L
CREAT SERPL-MCNC: 0.77 MG/DL
EGFR: 88 ML/MIN/1.73M2
EOSINOPHIL # BLD AUTO: 0.09 K/UL
EOSINOPHIL NFR BLD AUTO: 1.8 %
ESTIMATED AVERAGE GLUCOSE: 123 MG/DL
GLUCOSE SERPL-MCNC: 97 MG/DL
HBA1C MFR BLD HPLC: 5.9 %
HCT VFR BLD CALC: 39.9 %
HDLC SERPL-MCNC: 64 MG/DL
HGB BLD-MCNC: 12.6 G/DL
IMM GRANULOCYTES NFR BLD AUTO: 0.2 %
LDLC SERPL CALC-MCNC: 131 MG/DL
LYMPHOCYTES # BLD AUTO: 1.95 K/UL
LYMPHOCYTES NFR BLD AUTO: 39.5 %
MAN DIFF?: NORMAL
MCHC RBC-ENTMCNC: 28.6 PG
MCHC RBC-ENTMCNC: 31.6 GM/DL
MCV RBC AUTO: 90.5 FL
MONOCYTES # BLD AUTO: 0.43 K/UL
MONOCYTES NFR BLD AUTO: 8.7 %
NEUTROPHILS # BLD AUTO: 2.43 K/UL
NEUTROPHILS NFR BLD AUTO: 49.2 %
NONHDLC SERPL-MCNC: 147 MG/DL
PLATELET # BLD AUTO: 238 K/UL
POTASSIUM SERPL-SCNC: 4.9 MMOL/L
PROT SERPL-MCNC: 6.8 G/DL
RBC # BLD: 4.41 M/UL
RBC # FLD: 12.4 %
SODIUM SERPL-SCNC: 142 MMOL/L
TRIGL SERPL-MCNC: 81 MG/DL
WBC # FLD AUTO: 4.94 K/UL

## 2022-06-21 ENCOUNTER — NON-APPOINTMENT (OUTPATIENT)
Age: 62
End: 2022-06-21

## 2022-06-30 ENCOUNTER — NON-APPOINTMENT (OUTPATIENT)
Age: 62
End: 2022-06-30

## 2022-07-05 ENCOUNTER — APPOINTMENT (OUTPATIENT)
Dept: OTOLARYNGOLOGY | Facility: CLINIC | Age: 62
End: 2022-07-05

## 2022-07-05 VITALS — HEIGHT: 67 IN | WEIGHT: 154 LBS | BODY MASS INDEX: 24.17 KG/M2 | TEMPERATURE: 98 F

## 2022-07-05 DIAGNOSIS — R13.12 DYSPHAGIA, OROPHARYNGEAL PHASE: ICD-10-CM

## 2022-07-05 DIAGNOSIS — K21.9 GASTRO-ESOPHAGEAL REFLUX DISEASE W/OUT ESOPHAGITIS: ICD-10-CM

## 2022-07-05 DIAGNOSIS — J38.5 LARYNGEAL SPASM: ICD-10-CM

## 2022-07-05 DIAGNOSIS — H68.022 CHRONIC EUSTACHIAN SALPINGITIS, LEFT EAR: ICD-10-CM

## 2022-07-05 PROCEDURE — 99204 OFFICE O/P NEW MOD 45 MIN: CPT | Mod: 25

## 2022-07-05 PROCEDURE — 31575 DIAGNOSTIC LARYNGOSCOPY: CPT

## 2022-07-05 RX ORDER — FLUTICASONE PROPIONATE 50 UG/1
50 SPRAY, METERED NASAL DAILY
Qty: 1 | Refills: 6 | Status: ACTIVE | COMMUNITY
Start: 2022-07-05 | End: 1900-01-01

## 2022-07-05 NOTE — PHYSICAL EXAM
[Midline] : trachea located in midline position [Normal] : no rashes [de-identified] : thyroidectomy scar

## 2022-07-05 NOTE — ASSESSMENT
[FreeTextEntry1] : reviewed US 12/21\par left otalgia\par rhinits and nasal congestion\par restart fluticasone\par exam neg and larynx unremarkable except for laryngea signs refluc\par no vc weakness\par considering hx\par rec ct neck and chest

## 2022-07-05 NOTE — HISTORY OF PRESENT ILLNESS
[de-identified] : co choking without dysphagia\par saliva can cause choking\par usually ok w drinking\par co pain left side neck and left ear\par hx thyroid cancer thyroidectomy 2011 and reoperation 2015\par US 12/21\par co chest pain radiating from mediastinal area to laryngeal area\par co nasal congestion used fluticasone in past\par hx gerd using daily omepraole 40 q d but reflux discomfort different from new throat pain

## 2022-07-05 NOTE — PROCEDURE
[de-identified] : Indication for procedure:Unable to examine laryngeal structures with mirror exam.  Patient with excessive mucous in throat and fullness.\par The patient has intermittent choking\par \par scope # 1\par Topical anesthesia is established with viscous xylocaine 2%.\par A flexible fiberoptic laryngoscope is introduced through the nares.\par No masses or inflammation is noted in the nasopharynx.\par The tongue base and valleculae are clear.\par The posterior pharyngeal wall is negative.  The hypopharynx is unobstructed.\par The supraglottic larynx is unremarkable.\par Both vocal cords are fully mobile without any polyp or nodule seen.  The vocal cords have no diffuse edema.\par There is moderate edema overlying the arytenoid cartilages and inter-arytenoid space.\par  No erythema is noted the posterior larynx.\par The subglottic space is clear.\par The voice has a  normal quality.\par

## 2022-07-15 ENCOUNTER — OUTPATIENT (OUTPATIENT)
Dept: OUTPATIENT SERVICES | Facility: HOSPITAL | Age: 62
LOS: 1 days | End: 2022-07-15
Payer: MEDICAID

## 2022-07-15 ENCOUNTER — APPOINTMENT (OUTPATIENT)
Dept: CT IMAGING | Facility: CLINIC | Age: 62
End: 2022-07-15

## 2022-07-15 DIAGNOSIS — C73 MALIGNANT NEOPLASM OF THYROID GLAND: ICD-10-CM

## 2022-07-15 DIAGNOSIS — Z00.8 ENCOUNTER FOR OTHER GENERAL EXAMINATION: ICD-10-CM

## 2022-07-15 PROCEDURE — 70491 CT SOFT TISSUE NECK W/DYE: CPT

## 2022-07-15 PROCEDURE — 71260 CT THORAX DX C+: CPT | Mod: 26

## 2022-07-15 PROCEDURE — 70491 CT SOFT TISSUE NECK W/DYE: CPT | Mod: 26

## 2022-07-15 PROCEDURE — 71260 CT THORAX DX C+: CPT

## 2022-07-21 ENCOUNTER — NON-APPOINTMENT (OUTPATIENT)
Age: 62
End: 2022-07-21

## 2022-08-08 NOTE — ED PROVIDER NOTE - CPE EDP CARDIAC NORM
normal... to ~50 degrees knee flexion (able to achieve ~85 degrees knee flexion with close chain sustained stretch on 1-step)/Right LE Passive ROM was WFL (within functional limits)

## 2022-08-18 ENCOUNTER — APPOINTMENT (OUTPATIENT)
Dept: INTERNAL MEDICINE | Facility: CLINIC | Age: 62
End: 2022-08-18

## 2022-08-18 VITALS
SYSTOLIC BLOOD PRESSURE: 116 MMHG | HEART RATE: 99 BPM | BODY MASS INDEX: 23.7 KG/M2 | OXYGEN SATURATION: 98 % | TEMPERATURE: 97.8 F | WEIGHT: 151 LBS | DIASTOLIC BLOOD PRESSURE: 70 MMHG | HEIGHT: 67 IN

## 2022-08-18 PROCEDURE — 99214 OFFICE O/P EST MOD 30 MIN: CPT

## 2022-08-18 RX ORDER — MAGNESIUM OXIDE 241.3 MG/1000MG
400 TABLET ORAL
Qty: 30 | Refills: 0 | Status: DISCONTINUED | COMMUNITY
Start: 2021-11-30 | End: 2022-08-18

## 2022-08-19 NOTE — HISTORY OF PRESENT ILLNESS
[FreeTextEntry1] : FU [de-identified] : ANDREW STEPHENS is a 62 year F who comes in for a follow up visit.\par Pt with hx of FMF and was f/u with Dr.Terri Fields FMF specialist at [facility] but due to insurance and billing will not be able to see/speak to her anymore.  On higher dose colchicine she has had only 1 flare. \par Her Endo retired and needs new one at this time, she is on higher dose levothyroxine 112 mcg and needs repeat bw in nov and apt in dec.\par She notes cramps of b/l LE over the past few months. \par Patient denies any cp, sob,abdominal pain, nausea, vomiting, palpitations, fever, chills, constipation, diarrhea.\par

## 2022-08-19 NOTE — ASSESSMENT
[FreeTextEntry1] : 1.familial Mediterranean fever: Doing well on colchicine milligrams 3 tablets at bedtime.  We will no longer be following up with  At [facility].  Discussed referral to FMF specialist as she needs regular follow-up, check urine protein creatinine ratio and vitamin B12 level.\par \par 2.hypothyroidism: Advise finding new endocrinologist as her endocrinologist has since retired.  She will need repeat TFTs and follow-up with endocrine by December.  Continue on levothyroxine 112 mcg daily.  Recent ENT notes reviewed.\par \par 3.chronic hepatitis B: Has not found new provider for her hepatitis care as well, discussed importance for regular follow-up and given referrals again today.\par \par 4.joint pain: Discussed referral to rheumatology for work-up for fibromyalgia.

## 2022-08-22 ENCOUNTER — NON-APPOINTMENT (OUTPATIENT)
Age: 62
End: 2022-08-22

## 2022-08-22 LAB
CREAT SPEC-SCNC: 28 MG/DL
CREAT/PROT UR: NORMAL RATIO
PROT UR-MCNC: <4 MG/DL
VIT B12 SERPL-MCNC: 521 PG/ML

## 2022-08-30 ENCOUNTER — NON-APPOINTMENT (OUTPATIENT)
Age: 62
End: 2022-08-30

## 2022-09-15 ENCOUNTER — APPOINTMENT (OUTPATIENT)
Dept: RHEUMATOLOGY | Facility: CLINIC | Age: 62
End: 2022-09-15

## 2022-09-30 ENCOUNTER — APPOINTMENT (OUTPATIENT)
Dept: INTERNAL MEDICINE | Facility: CLINIC | Age: 62
End: 2022-09-30

## 2022-09-30 VITALS
SYSTOLIC BLOOD PRESSURE: 120 MMHG | DIASTOLIC BLOOD PRESSURE: 72 MMHG | BODY MASS INDEX: 24.17 KG/M2 | TEMPERATURE: 98.3 F | HEART RATE: 76 BPM | HEIGHT: 67 IN | WEIGHT: 154 LBS | OXYGEN SATURATION: 98 %

## 2022-09-30 DIAGNOSIS — J44.9 CHRONIC OBSTRUCTIVE PULMONARY DISEASE, UNSPECIFIED: ICD-10-CM

## 2022-09-30 PROCEDURE — 99214 OFFICE O/P EST MOD 30 MIN: CPT

## 2022-09-30 RX ORDER — BECLOMETHASONE DIPROPIONATE HFA 80 UG/1
80 AEROSOL, METERED RESPIRATORY (INHALATION)
Qty: 1 | Refills: 2 | Status: DISCONTINUED | COMMUNITY
Start: 2021-05-21 | End: 2022-09-30

## 2022-09-30 RX ORDER — ACYCLOVIR 50 MG/G
5 CREAM TOPICAL
Qty: 1 | Refills: 1 | Status: DISCONTINUED | COMMUNITY
Start: 2021-11-30 | End: 2022-09-30

## 2022-09-30 NOTE — HISTORY OF PRESENT ILLNESS
[FreeTextEntry1] : FU [de-identified] : ANDREW STEPHENS is a 62 year F who comes in for a follow up visit.\par Pt notes of this past wed of this week she had swelling/bruise of left arm, left third MCP and left lateral foot. She didn't have any injury or trauma that she recalls. She notes some tingling of foot and pain in left foot as well. \par Pt did make apt for endo, hepatology and rheumatology next year. \par She did get her cardiac results recently. \par Patient denies any cp, sob,abdominal pain, nausea, vomiting, palpitations, fever, chills, constipation, diarrhea.\par

## 2022-09-30 NOTE — ASSESSMENT
[FreeTextEntry1] : 1.familial Mediterranean fever: Doing well on colchicine milligrams 3 tablets at bedtime.  Discussed referral to FMF specialist as she needs regular follow-up, recent urine protein creatinine ratio and vitamin B12 level within normal limits.\par \par 2.hypothyroidism: Recheck TFTs, following with head and neck surgeon in [facility], follow-up with endocrinology continue on levothyroxine 112 mcg daily.  Recent ENT notes reviewed.\par \par 3.chronic hepatitis B: Now has appointment scheduled for follow-up with new hepatologist\par \par 4.joint pain: Discussed referral to rheumatology for work-up for fibromyalgia.

## 2022-10-02 LAB
T4 FREE SERPL-MCNC: 1.6 NG/DL
TSH SERPL-ACNC: 0.09 UIU/ML

## 2022-10-05 ENCOUNTER — NON-APPOINTMENT (OUTPATIENT)
Age: 62
End: 2022-10-05

## 2022-10-10 LAB — T4BG SERPL-MCNC: 21 UG/ML

## 2022-11-15 NOTE — DISCHARGE NOTE NURSING/CASE MANAGEMENT/SOCIAL WORK - HAS THE PATIENT RECEIVED THE INFLUENZA VACCINE THIS SEASON?
Last visit 12/13/18   NO SHOW 3/14/19, 3/25/19  Next visit None pending.    Medication last prescribed 12/13/18 # 30 with 1 refill for Aripiprazole Abilify 5 mg tablet taking 1 daily. 3/19/19 # 90 with 1 refill for Clonazepam, Klonopin 1 mg tablet taking 1 tablet 3 x daily as needed. 12/13/18 # 30 with  2 refills for Escitalopram Lexapro 20 mg tablet taking 1 daily. 3/19/19 # 30 with no refills for Quetiapine Seroquel 300 mg tablet taking 1 nightly. Verified prescription in Providers note. Clonazepam last filled per PDMP on 2/8/19 Script dated 12/13/18.    Refill denied for Clonazepam Too early to refill at this time.March script should be on file.    Refills pending Pt schedule an appt. For Escitalopram and Quetiapine.    Routing to JUAN A Murray to confirm Pt should be taking Aripiprazole- EMR shows should be out in February.   Medication:    Outpatient Medications Marked as Taking for the 4/30/19 encounter (Refill) with JUAN A Murray   Medication Sig Dispense Refill   • clonazePAM (KLONOPIN) 1 MG tablet Take 1 tablet by mouth 3 times daily as needed for Anxiety. 90 tablet 1   • QUEtiapine (SEROQUEL) 300 MG tablet Take 1 tablet by mouth at bedtime. 30 tablet 0   • escitalopram (LEXAPRO) 20 MG tablet Take 1 tablet by mouth daily. 30 tablet 2   • ARIPiprazole (ABILIFY) 5 MG tablet Take 1 tablet by mouth daily. 30 tablet 1       Message to Prescriber: Requesting 90 day prescription    Pharmacy has been verified.    [] Patient completely out of medication     Patient currently has follow up appointment scheduled       Detail Level: Detailed Quality 226: Preventive Care And Screening: Tobacco Use: Screening And Cessation Intervention: Patient screened for tobacco use and is an ex/non-smoker Quality 130: Documentation Of Current Medications In The Medical Record: Current Medications Documented Quality 431: Preventive Care And Screening: Unhealthy Alcohol Use - Screening: Patient screened for unhealthy alcohol use using a single question and scores less than 2 times per year Quality 431: Preventive Care And Screening: Unhealthy Alcohol Use - Screening: Patient not identified as an unhealthy alcohol user when screened for unhealthy alcohol use using a systematic screening method no...

## 2022-12-06 ENCOUNTER — APPOINTMENT (OUTPATIENT)
Dept: ENDOCRINOLOGY | Facility: CLINIC | Age: 62
End: 2022-12-06

## 2023-01-11 NOTE — H&P ADULT - NS MD HP PULSE RADIAL
Addended by: MILES CAGE on: 1/11/2023 07:46 AM     Modules accepted: Orders    
right normal/left normal

## 2023-01-24 RX ORDER — OMEGA-3/DHA/EPA/FISH OIL 300-1000MG
400 CAPSULE ORAL
Qty: 90 | Refills: 1 | Status: ACTIVE | COMMUNITY
Start: 2021-11-30 | End: 1900-01-01

## 2023-01-26 ENCOUNTER — APPOINTMENT (OUTPATIENT)
Dept: HEPATOLOGY | Facility: CLINIC | Age: 63
End: 2023-01-26

## 2023-02-06 NOTE — ED ADULT NURSE NOTE - PAIN RATING/NUMBER SCALE (0-10): REST
5 Azithromycin Counseling:  I discussed with the patient the risks of azithromycin including but not limited to GI upset, allergic reaction, drug rash, diarrhea, and yeast infections.

## 2023-02-21 ENCOUNTER — APPOINTMENT (OUTPATIENT)
Dept: INTERNAL MEDICINE | Facility: CLINIC | Age: 63
End: 2023-02-21
Payer: MEDICAID

## 2023-02-21 ENCOUNTER — RX RENEWAL (OUTPATIENT)
Age: 63
End: 2023-02-21

## 2023-02-21 VITALS
WEIGHT: 149 LBS | HEIGHT: 67 IN | DIASTOLIC BLOOD PRESSURE: 72 MMHG | SYSTOLIC BLOOD PRESSURE: 110 MMHG | TEMPERATURE: 98.5 F | HEART RATE: 86 BPM | BODY MASS INDEX: 23.39 KG/M2 | OXYGEN SATURATION: 95 %

## 2023-02-21 PROCEDURE — 99214 OFFICE O/P EST MOD 30 MIN: CPT

## 2023-02-21 NOTE — ASSESSMENT
[FreeTextEntry1] : 1.familial Mediterranean fever: Possible recent exacerbation.  Continue on  colchicine milligrams 3 tablets at bedtime.  Discussed referral to FMF specialist as she needs regular follow-up, continue with CMP, CBC, vitamin B12 and urine protein creatinine ratio yearly.  Refer to rheumatology at this time.\par \par 2.hypothyroidism: Recheck TFTs as patient did not go for follow-up blood work after changing levothyroxine dose., following with head and neck surgeon in [place] soon, follow-up with endocrinology on Thursday, continue on levothyroxine 100 mcg daily.  \par \par 3.chronic hepatitis B: Hepatology appointment canceled, advised to call numbers given by prior hepatologist office for new appointment.  \par \par

## 2023-02-21 NOTE — HISTORY OF PRESENT ILLNESS
[FreeTextEntry1] : FU [de-identified] : ANDREW STEPHENS is a 63 year F who comes in for a follow up visit.\par Pt notes red bump large in size of right arm from November, that self resolved after a few weeks. She also had lesion on leg and heal that self resolved, painful/red areas. \par Pt did make apt for endocrine in 2 days. Her hepatology and rheum apts were canceled. \par For her FMF she needs yearly checkups of her CBC, CMP, vitamin B12, urine protein creatinine ratio which was last checked in the summer.\par Patient denies any cp, sob,abdominal pain, nausea, vomiting, palpitations, fever, chills, constipation, diarrhea.\par

## 2023-02-22 LAB
ALBUMIN SERPL ELPH-MCNC: 4.4 G/DL
ALP BLD-CCNC: 58 U/L
ALT SERPL-CCNC: 20 U/L
ANION GAP SERPL CALC-SCNC: 12 MMOL/L
AST SERPL-CCNC: 25 U/L
BILIRUB SERPL-MCNC: 0.5 MG/DL
BUN SERPL-MCNC: 18 MG/DL
CALCIUM SERPL-MCNC: 10.3 MG/DL
CHLORIDE SERPL-SCNC: 102 MMOL/L
CO2 SERPL-SCNC: 28 MMOL/L
CREAT SERPL-MCNC: 0.87 MG/DL
EGFR: 76 ML/MIN/1.73M2
ESTIMATED AVERAGE GLUCOSE: 126 MG/DL
GLUCOSE SERPL-MCNC: 115 MG/DL
HBA1C MFR BLD HPLC: 6 %
POTASSIUM SERPL-SCNC: 4.8 MMOL/L
PROT SERPL-MCNC: 7.5 G/DL
SODIUM SERPL-SCNC: 142 MMOL/L
T4 FREE SERPL-MCNC: 1.7 NG/DL
TSH SERPL-ACNC: 0.26 UIU/ML

## 2023-02-23 ENCOUNTER — APPOINTMENT (OUTPATIENT)
Dept: ENDOCRINOLOGY | Facility: CLINIC | Age: 63
End: 2023-02-23
Payer: MEDICAID

## 2023-02-23 ENCOUNTER — NON-APPOINTMENT (OUTPATIENT)
Age: 63
End: 2023-02-23

## 2023-02-23 VITALS
DIASTOLIC BLOOD PRESSURE: 81 MMHG | HEIGHT: 67 IN | BODY MASS INDEX: 24.01 KG/M2 | OXYGEN SATURATION: 96 % | TEMPERATURE: 97.5 F | WEIGHT: 153 LBS | HEART RATE: 73 BPM | SYSTOLIC BLOOD PRESSURE: 137 MMHG | RESPIRATION RATE: 16 BRPM

## 2023-02-23 PROCEDURE — 99215 OFFICE O/P EST HI 40 MIN: CPT

## 2023-04-03 ENCOUNTER — RX RENEWAL (OUTPATIENT)
Age: 63
End: 2023-04-03

## 2023-04-25 ENCOUNTER — RX RENEWAL (OUTPATIENT)
Age: 63
End: 2023-04-25

## 2023-05-13 ENCOUNTER — LABORATORY RESULT (OUTPATIENT)
Age: 63
End: 2023-05-13

## 2023-05-16 LAB
T3 SERPL-MCNC: 88 NG/DL
T4 FREE SERPL-MCNC: 1.8 NG/DL
THYROGLOB AB SERPL-ACNC: <20 IU/ML
THYROGLOB SERPL-MCNC: <0.2 NG/ML
TSH SERPL-ACNC: 0.24 UIU/ML

## 2023-05-22 ENCOUNTER — NON-APPOINTMENT (OUTPATIENT)
Age: 63
End: 2023-05-22

## 2023-05-22 ENCOUNTER — APPOINTMENT (OUTPATIENT)
Dept: RHEUMATOLOGY | Facility: CLINIC | Age: 63
End: 2023-05-22
Payer: MEDICAID

## 2023-05-22 VITALS
OXYGEN SATURATION: 98 % | HEIGHT: 67 IN | SYSTOLIC BLOOD PRESSURE: 130 MMHG | WEIGHT: 155 LBS | BODY MASS INDEX: 24.33 KG/M2 | DIASTOLIC BLOOD PRESSURE: 80 MMHG | HEART RATE: 89 BPM | TEMPERATURE: 97 F

## 2023-05-22 PROCEDURE — 99204 OFFICE O/P NEW MOD 45 MIN: CPT | Mod: 1L

## 2023-05-22 PROCEDURE — XXXXX: CPT | Mod: 1L

## 2023-05-22 NOTE — REVIEW OF SYSTEMS
[Recent Weight Loss (___ Lbs)] : recent [unfilled] ~Ulb weight loss [Arthralgias] : arthralgias [Joint Pain] : joint pain [Joint Swelling] : joint swelling [Joint Stiffness] : joint stiffness [Limb Pain] : limb pain [Limb Swelling] : limb swelling [As Noted in HPI] : as noted in HPI [Skin Lesions] : skin lesion [Negative] : Psychiatric [Chest Pain] : chest pain

## 2023-05-25 ENCOUNTER — RESULT REVIEW (OUTPATIENT)
Age: 63
End: 2023-05-25

## 2023-05-25 ENCOUNTER — APPOINTMENT (OUTPATIENT)
Dept: HEPATOLOGY | Facility: CLINIC | Age: 63
End: 2023-05-25
Payer: MEDICAID

## 2023-05-25 ENCOUNTER — LABORATORY RESULT (OUTPATIENT)
Age: 63
End: 2023-05-25

## 2023-05-25 VITALS
SYSTOLIC BLOOD PRESSURE: 130 MMHG | BODY MASS INDEX: 24.33 KG/M2 | HEIGHT: 67 IN | HEART RATE: 69 BPM | DIASTOLIC BLOOD PRESSURE: 78 MMHG | WEIGHT: 155 LBS | OXYGEN SATURATION: 98 %

## 2023-05-25 PROCEDURE — 99204 OFFICE O/P NEW MOD 45 MIN: CPT

## 2023-05-26 LAB
ALBUMIN SERPL ELPH-MCNC: 4.8 G/DL
ALP BLD-CCNC: 58 U/L
ALT SERPL-CCNC: 14 U/L
ANION GAP SERPL CALC-SCNC: 13 MMOL/L
APPEARANCE: CLEAR
AST SERPL-CCNC: 19 U/L
BACTERIA: NEGATIVE /HPF
BILIRUB SERPL-MCNC: 0.4 MG/DL
BILIRUBIN URINE: NEGATIVE
BLOOD URINE: NEGATIVE
BUN SERPL-MCNC: 19 MG/DL
CALCIUM SERPL-MCNC: 10.2 MG/DL
CAST: 0 /LPF
CHLORIDE SERPL-SCNC: 102 MMOL/L
CO2 SERPL-SCNC: 26 MMOL/L
COLOR: YELLOW
CREAT SERPL-MCNC: 0.87 MG/DL
CREAT SPEC-SCNC: 11 MG/DL
CREAT/PROT UR: NORMAL RATIO
CRP SERPL-MCNC: 3 MG/L
EGFR: 76 ML/MIN/1.73M2
EPITHELIAL CELLS: 0 /HPF
ERYTHROCYTE [SEDIMENTATION RATE] IN BLOOD BY WESTERGREN METHOD: 18 MM/HR
GLUCOSE QUALITATIVE U: NEGATIVE MG/DL
GLUCOSE SERPL-MCNC: 83 MG/DL
HBV CORE IGG+IGM SER QL: REACTIVE
HBV SURFACE AB SER QL: NONREACTIVE
HBV SURFACE AG SER QL: REACTIVE
HCV AB SER QL: NONREACTIVE
HCV AB SER QL: NONREACTIVE
HCV RNA SERPL NAA DL=5-ACNC: NOT DETECTED IU/ML
HCV RNA SERPL NAA+PROBE-LOG IU: NOT DETECTED LOGIU/ML
HCV S/CO RATIO: 0.12 S/CO
HCV S/CO RATIO: 0.12 S/CO
HEPC RNA INTERP: NOT DETECTED
INR PPP: 1.08 RATIO
KETONES URINE: NEGATIVE MG/DL
LEUKOCYTE ESTERASE URINE: NEGATIVE
MICROSCOPIC-UA: NORMAL
NITRITE URINE: NEGATIVE
PH URINE: 6.5
POTASSIUM SERPL-SCNC: 4.4 MMOL/L
PROT SERPL-MCNC: 7.6 G/DL
PROT UR-MCNC: <4 MG/DL
PROTEIN URINE: NEGATIVE MG/DL
PT BLD: 12.6 SEC
RED BLOOD CELLS URINE: 0 /HPF
SODIUM SERPL-SCNC: 141 MMOL/L
SPECIFIC GRAVITY URINE: 1
UROBILINOGEN URINE: 0.2 MG/DL
WHITE BLOOD CELLS URINE: 0 /HPF

## 2023-05-29 NOTE — HISTORY OF PRESENT ILLNESS
[de-identified] : Ms. ANDREW STEPHENS is a 63 year old female who was being followed by [facility] hepatologist - Dr oMr Hitchcock ([phone]) for chronic hepatitis B. She is unsure of hepatitis status of her  and children. She has a PMH of familial mediterranean fever, hypothyroidism, HLD, pre-diabetic. 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 Originally from [place]. Has children and grandchildren who live in [place].\par Alcohol: none.\par Smoking: none.\par Drugs: none.\par Herbal&Supplements: none. [FreeTextEntry1] : Ms. ANDREW STEPHENS a 63 year White female  presents today for  a hepatology appointment. She has been a patient  [facility] CARRINGTON.\par \par She is originally from [place], previously under the care of Dr. Mor Bowman, a hepatologist at [facility] (contact: [phone]), for chronic hepatitis B. She comes to the office today to re-establish care with a hepatologist for the management of her hepatitis B. The patient is unsure about the hepatitis B status of her  and children. 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

## 2023-05-29 NOTE — ASSESSMENT
[FreeTextEntry1] : Assessment and Plan:\par \par Assessment:\par Ms. ANDREW STEPHENS a 63 year White female with a history of chronic hepatitis B, familial Mediterranean fever, hypothyroidism, hyperlipidemia, and pre-diabetes presents to re-establish care with hepatology for the management of chronic hepatitis B. She is unsure about the hepatitis B status of her  and children. The patient has a past surgical history of thyroidectomy with radiation.\par \par Plan:\par \par I. Hepatitis B:\par \par -Educated the patient about the natural history, modes of transmission, diagnostic evaluation, and treatment options for hepatitis B.\par -Recommended checking the hepatitis B status of all household and close contacts. If they are not immune, vaccination should be considered.\par -Emphasized the risk of developing liver cancer and advised imaging every 6 months for primary hepatocellular carcinoma screening.\par -Discussed long-term treatment for chronic hepatitis B and explained that it is likely to be lifelong once we start it. I will review labs, and will recommend treatment on next visit, if indicated.\par -Reviewed the criteria for stopping treatment, such as hepatitis B surface antigen seroconversion with the development of hepatitis B surface antibody.\par -Discussed the potential side effects of therapy.\par \par Plan for Hepatitis B:\par \par Work-up Labs:\par -Order HBV PCR to assess viral load.\par -Check Hepatitis Be Antibody, Hepatitis Be Antigen, and Hepatitis D Antibody.\par \par Imaging:\par -Order an abdominal ultrasound for hepatocellular carcinoma (HCC) screening.\par -Draw AFP (alpha-fetoprotein) today.\par -Arrange for US Elastography to determine the fibrosis status of the liver.\par \par Follow-up:\par Schedule a follow-up appointment in 6 months. We will discuss the results of the labs and ultrasound over the phone.

## 2023-05-30 LAB
HBV DNA # SERPL NAA+PROBE: 34 IU/ML
HBV DNA # SERPL NAA+PROBE: 35 IU/ML
HBV E AB SER QL: REACTIVE
HBV E AG SER QL: NONREACTIVE
HBV SURFACE AB SER QL: NONREACTIVE
HBV SURFACE AG SER QL: REACTIVE
HEPB DNA PCR INT: DETECTED
HEPB DNA PCR INT: DETECTED
HEPB DNA PCR LOG: 1.53 LOGIU/ML
HEPB DNA PCR LOG: 1.55 LOGIU/ML
M TB IFN-G BLD-IMP: NEGATIVE
QUANTIFERON TB PLUS MITOGEN MINUS NIL: 8.23 IU/ML
QUANTIFERON TB PLUS NIL: 0.02 IU/ML
QUANTIFERON TB PLUS TB1 MINUS NIL: -0.01 IU/ML
QUANTIFERON TB PLUS TB2 MINUS NIL: 0 IU/ML

## 2023-06-01 LAB
ALPHA-1-FETOPROTEIN-L3: NORMAL %
ALPHA-1-FETOPROTEIN: 2.4 NG/ML

## 2023-06-05 LAB — HDV AB SER IA-ACNC: NEGATIVE

## 2023-06-19 ENCOUNTER — APPOINTMENT (OUTPATIENT)
Dept: RADIOLOGY | Facility: CLINIC | Age: 63
End: 2023-06-19
Payer: MEDICAID

## 2023-06-19 ENCOUNTER — OUTPATIENT (OUTPATIENT)
Dept: OUTPATIENT SERVICES | Facility: HOSPITAL | Age: 63
LOS: 1 days | End: 2023-06-19
Payer: MEDICAID

## 2023-06-19 ENCOUNTER — APPOINTMENT (OUTPATIENT)
Dept: ULTRASOUND IMAGING | Facility: CLINIC | Age: 63
End: 2023-06-19
Payer: MEDICAID

## 2023-06-19 DIAGNOSIS — C73 MALIGNANT NEOPLASM OF THYROID GLAND: ICD-10-CM

## 2023-06-19 DIAGNOSIS — M54.50 LOW BACK PAIN, UNSPECIFIED: ICD-10-CM

## 2023-06-19 PROCEDURE — 76536 US EXAM OF HEAD AND NECK: CPT | Mod: 26

## 2023-06-19 PROCEDURE — 72110 X-RAY EXAM L-2 SPINE 4/>VWS: CPT

## 2023-06-19 PROCEDURE — 72110 X-RAY EXAM L-2 SPINE 4/>VWS: CPT | Mod: 26

## 2023-06-19 PROCEDURE — 76536 US EXAM OF HEAD AND NECK: CPT

## 2023-06-20 ENCOUNTER — APPOINTMENT (OUTPATIENT)
Dept: ENDOCRINOLOGY | Facility: CLINIC | Age: 63
End: 2023-06-20

## 2023-06-20 ENCOUNTER — APPOINTMENT (OUTPATIENT)
Dept: ENDOCRINOLOGY | Facility: CLINIC | Age: 63
End: 2023-06-20
Payer: MEDICAID

## 2023-06-20 VITALS
HEIGHT: 67 IN | WEIGHT: 159 LBS | OXYGEN SATURATION: 98 % | DIASTOLIC BLOOD PRESSURE: 76 MMHG | BODY MASS INDEX: 24.96 KG/M2 | SYSTOLIC BLOOD PRESSURE: 124 MMHG | HEART RATE: 93 BPM

## 2023-06-20 DIAGNOSIS — E55.9 VITAMIN D DEFICIENCY, UNSPECIFIED: ICD-10-CM

## 2023-06-20 PROCEDURE — 99214 OFFICE O/P EST MOD 30 MIN: CPT

## 2023-06-21 ENCOUNTER — RESULT REVIEW (OUTPATIENT)
Age: 63
End: 2023-06-21

## 2023-06-21 ENCOUNTER — APPOINTMENT (OUTPATIENT)
Dept: ULTRASOUND IMAGING | Facility: CLINIC | Age: 63
End: 2023-06-21
Payer: MEDICAID

## 2023-06-21 ENCOUNTER — OUTPATIENT (OUTPATIENT)
Dept: OUTPATIENT SERVICES | Facility: HOSPITAL | Age: 63
LOS: 1 days | End: 2023-06-21

## 2023-06-21 DIAGNOSIS — B18.1 CHRONIC VIRAL HEPATITIS B WITHOUT DELTA-AGENT: ICD-10-CM

## 2023-06-21 PROCEDURE — 76700 US EXAM ABDOM COMPLETE: CPT | Mod: 26

## 2023-06-21 PROCEDURE — 76981 USE PARENCHYMA: CPT | Mod: 26,59

## 2023-06-23 ENCOUNTER — NON-APPOINTMENT (OUTPATIENT)
Age: 63
End: 2023-06-23

## 2023-06-30 ENCOUNTER — NON-APPOINTMENT (OUTPATIENT)
Age: 63
End: 2023-06-30

## 2023-07-08 ENCOUNTER — APPOINTMENT (OUTPATIENT)
Dept: ULTRASOUND IMAGING | Facility: CLINIC | Age: 63
End: 2023-07-08

## 2023-08-08 ENCOUNTER — APPOINTMENT (OUTPATIENT)
Dept: INTERNAL MEDICINE | Facility: CLINIC | Age: 63
End: 2023-08-08
Payer: MEDICAID

## 2023-08-08 VITALS
TEMPERATURE: 97.7 F | DIASTOLIC BLOOD PRESSURE: 68 MMHG | WEIGHT: 161 LBS | HEART RATE: 76 BPM | OXYGEN SATURATION: 97 % | HEIGHT: 67 IN | BODY MASS INDEX: 25.27 KG/M2 | SYSTOLIC BLOOD PRESSURE: 110 MMHG

## 2023-08-08 DIAGNOSIS — C73 MALIGNANT NEOPLASM OF THYROID GLAND: ICD-10-CM

## 2023-08-08 PROCEDURE — 99214 OFFICE O/P EST MOD 30 MIN: CPT

## 2023-08-08 RX ORDER — MELOXICAM 7.5 MG/1
7.5 TABLET ORAL DAILY
Qty: 180 | Refills: 1 | Status: DISCONTINUED | COMMUNITY
Start: 2020-09-21 | End: 2023-08-08

## 2023-08-08 RX ORDER — TIZANIDINE 2 MG/1
2 TABLET ORAL
Qty: 60 | Refills: 3 | Status: DISCONTINUED | COMMUNITY
Start: 2020-08-10 | End: 2023-08-08

## 2023-08-08 NOTE — ASSESSMENT
[FreeTextEntry1] : 1FMF: now following with rheum, Does not wish to be on IL-1 inhibitor at this time, c/w colchicine 0.6 milligrams 3 tablets at bedtime.  continue with CMP, CBC, vitamin B12 and urine protein creatinine ratio yearly.    2.hypothyroidism: now following with endocrine, c/w current dose levothyroxine 112 mcg daily, following with head and neck surgeon in [place].  3.chronic hepatitis B: follow up with Hepatology, recent labs reviewed.  4.HM: Discussed blood work to obtain. Given new rx for ammonium lactate 12%.

## 2023-08-08 NOTE — HISTORY OF PRESENT ILLNESS
[FreeTextEntry1] : FU [de-identified] : ANDREW STEPHENS is a 63 year F who comes in for a follow up visit. Pt did establish care with endocrine and had levothyroxine increased to 112 mcg as goal is to keep TSH < 0.1. Pt also established with rheum to f/u on fibromyalgia and FMF and was offered IL-1 inhibitor over colchicine but pt wants to stay on oral medication.  Pt also established with hepatology and labs done recently. She notes cracks on her skin on feet and uses ammonium lactate prn.  Patient denies any cp, sob,abdominal pain, nausea, vomiting, palpitations, fever, chills, constipation, diarrhea.

## 2023-08-24 ENCOUNTER — RX RENEWAL (OUTPATIENT)
Age: 63
End: 2023-08-24

## 2023-08-28 ENCOUNTER — APPOINTMENT (OUTPATIENT)
Dept: RHEUMATOLOGY | Facility: CLINIC | Age: 63
End: 2023-08-28
Payer: MEDICAID

## 2023-08-28 VITALS
BODY MASS INDEX: 25.11 KG/M2 | DIASTOLIC BLOOD PRESSURE: 60 MMHG | HEART RATE: 84 BPM | HEIGHT: 67 IN | WEIGHT: 160 LBS | SYSTOLIC BLOOD PRESSURE: 102 MMHG | OXYGEN SATURATION: 97 % | TEMPERATURE: 97.2 F

## 2023-08-28 DIAGNOSIS — M25.50 PAIN IN UNSPECIFIED JOINT: ICD-10-CM

## 2023-08-28 DIAGNOSIS — M54.50 LOW BACK PAIN, UNSPECIFIED: ICD-10-CM

## 2023-08-28 DIAGNOSIS — G89.29 LOW BACK PAIN, UNSPECIFIED: ICD-10-CM

## 2023-08-28 DIAGNOSIS — M62.838 OTHER MUSCLE SPASM: ICD-10-CM

## 2023-08-28 PROCEDURE — 99214 OFFICE O/P EST MOD 30 MIN: CPT

## 2023-08-28 NOTE — PHYSICAL EXAM
[General Appearance - Alert] : alert [General Appearance - In No Acute Distress] : in no acute distress [General Appearance - Well Nourished] : well nourished [General Appearance - Well Developed] : well developed [Sclera] : the sclera and conjunctiva were normal [PERRL With Normal Accommodation] : pupils were equal in size, round, and reactive to light [Extraocular Movements] : extraocular movements were intact [Outer Ear] : the ears and nose were normal in appearance [Nasal Cavity] : the nasal mucosa and septum were normal [Oropharynx] : the oropharynx was normal [Neck Appearance] : the appearance of the neck was normal [Neck Cervical Mass (___cm)] : no neck mass was observed [Respiration, Rhythm And Depth] : normal respiratory rhythm and effort [Auscultation Breath Sounds / Voice Sounds] : lungs were clear to auscultation bilaterally [Heart Rate And Rhythm] : heart rate was normal and rhythm regular [Heart Sounds] : normal S1 and S2 [Murmurs] : no murmurs [Edema] : there was no peripheral edema [Bowel Sounds] : normal bowel sounds [Abdomen Soft] : soft [Abdomen Tenderness] : non-tender [Abdomen Mass (___ Cm)] : no abdominal mass palpated [No CVA Tenderness] : no ~M costovertebral angle tenderness [No Spinal Tenderness] : no spinal tenderness [Abnormal Walk] : normal gait [Musculoskeletal - Swelling] : no joint swelling seen [Motor Tone] : muscle strength and tone were normal [Skin Color & Pigmentation] : normal skin color and pigmentation [] : no rash [FreeTextEntry1] : hyperpigmented areas to BLE, no active inflamamtory rashes  [No Focal Deficits] : no focal deficits [Oriented To Time, Place, And Person] : oriented to person, place, and time [Impaired Insight] : insight and judgment were intact [Affect] : the affect was normal [Mood] : the mood was normal

## 2023-08-28 NOTE — REVIEW OF SYSTEMS
[Recent Weight Loss (___ Lbs)] : recent [unfilled] ~Ulb weight loss [Chest Pain] : chest pain [Arthralgias] : arthralgias [Joint Pain] : joint pain [Joint Swelling] : joint swelling [Joint Stiffness] : joint stiffness [Limb Pain] : limb pain [Limb Swelling] : limb swelling [As Noted in HPI] : as noted in HPI [Skin Lesions] : skin lesion [Negative] : Psychiatric

## 2023-08-28 NOTE — HISTORY OF PRESENT ILLNESS
[FreeTextEntry1] : Initial Evaluation by Dr. Nguyen:  63 year old female with a history of thyroid cancer in remission presents at the request of her primary care physician for further evaluation of her arthralgias. Previously all her physicians were at [facility].  She states that she was in her usual state of health until April, at that time she was diagnosed with covid, she was in the hospital for a week but has recovered. She did not require intubation. She has been more achy since then. She complains of aches and pains in most of her joints, almost all her joints bother her. She rates the pain as mild to moderate depending on the day and activity level. She admits to stiffness in the mornings as well. She admits to poor sleep and wakes up multiple times at night.  She denies alopecia, oral ulcers, sicca symptoms or cold sensitivity. She denies rises or colitis. She denies asthma, frequent sinus infections or ear infections. There is no family history of autoimmunity.  She has an average appetite and denies weight loss. She denies fevers or chills or night sweats. She is otherwise up-to-date on her age appropriate screenings.  She went for physical therapy once. She uses Tylenol and NSAIDs, she also has a prescription for Mobic in the past and wants to know how she should use these medications. It turns out that she is on Seroquel but uses it every other day. She does not recall using Cymbalta. She rates her current symptoms around a 5/10. She admits to poor sleep and wakes up achy --------------------------------------------------------------------------------------- Last seen by Dr. Nguyen 9/21/2020 for h/o arthralgias and FM. Presents today with diagnosis of FMF and evaluation:  5/22/2023: Diagnosed with FMF in March 2022 by [facility]  Dr. Chris Cisneros after both daughter and granddaughter were found to have FMF. Was placed on Colchicine and titrated up to 3 pills at night and tolerating well without GI distress. Today endorses bl hand and wrist pain, stiffness, and cramps that is intermittent. Endorses morning stiffness lasts about 15 minutes, improves after activity.  Also endorses itchy, painful raised bumps to arms and legs that are intermittent, with last rash noted in March 2023. Endorsing joint pain and finger swelling when FMF flares - will get large raised spots associated with digit or limb swelling. Goes away within 7-10 days. Denies fever a/w rash but endorses abdominal pain, low back pain, and BLE pain. Has tried Tylenol and ibuprofen for pain relief which do not help.   Also endorses tick bite in 2011 - went to the ER and believes she was treated with antibiotics.  Does have a history of Hep B - seeing hepatology next week.  Had a bad case of COVID - hospitalized March 2020 but never intubated but did require supplemental oxygen for about 6 months.   FMF ROS: +arthralgias and stiffness to bl hands, wrists, knees. +intermittent rash to BUE and BLE, +chest pain that is reproducible, +intermittent abd pain   Denies fever, chills, weight loss, sob, crushing chest pain, nausea, vomiting, and diarrhea.

## 2023-08-28 NOTE — ASSESSMENT
[FreeTextEntry1] : ANDREW STEPHENS is a 63 year old woman with a history of thyroid cancer in remission, Familial Mediterranean Fever, and Fibromyalgia presents today for evaluation of FMF --   # FMF: -Confirmed by [facility]  Dr. Chris Cisneros in March 2022 -history of abdominal pain and joint pains, worse with stress, and development of erysipelas to BUE and BLE -Tolerating Colchicine 3 pills at night but still having some flares tho milder - c/w this dose for now  -Could benefit from IL-1 inhibitor like Canakinumab or Anakinra, but patient wants time to think about this and log flares for 3 months  - Will order additional serologies to asses any underlying inflammation and disease activity   # Fibromylagia: -Difficult to assess how much of her pain is attributed to FMF vs Fibromyalgia, suspect if we get FMF under control, her Fibromyalgia pain will improve -In future, could consider Cymbalta or Gabapentin if pain appears more fibromyalgia  -reviewed XR LS spine - DJD - c/w back brace prn with prolonged standing   #Chronic Hep B  -Patient unclear if she has been vaccinated against Hep B, will order additional serologies in setting of possibility of immunosuppressive therapy  -Has follow up with Hepatology this week 5/25  RPA 3 months to discuss IL-1 treatment and FMF flares

## 2023-08-28 NOTE — HISTORY OF PRESENT ILLNESS
[FreeTextEntry1] : Initial Evaluation by Dr. Nguyen:  63 year old female with a history of thyroid cancer in remission presents at the request of her primary care physician for further evaluation of her arthralgias. Previously all her physicians were at [facility].  She states that she was in her usual state of health until April, at that time she was diagnosed with covid, she was in the hospital for a week but has recovered. She did not require intubation. She has been more achy since then. She complains of aches and pains in most of her joints, almost all her joints bother her. She rates the pain as mild to moderate depending on the day and activity level. She admits to stiffness in the mornings as well. She admits to poor sleep and wakes up multiple times at night.  She denies alopecia, oral ulcers, sicca symptoms or cold sensitivity. She denies rises or colitis. She denies asthma, frequent sinus infections or ear infections. There is no family history of autoimmunity.  She has an average appetite and denies weight loss. She denies fevers or chills or night sweats. She is otherwise up-to-date on her age appropriate screenings.  She went for physical therapy once. She uses Tylenol and NSAIDs, she also has a prescription for Mobic in the past and wants to know how she should use these medications. It turns out that she is on Seroquel but uses it every other day. She does not recall using Cymbalta. She rates her current symptoms around a 5/10. She admits to poor sleep and wakes up achy --------------------------------------------------------------------------------------- Last seen by Dr. Nguyen 9/21/2020 for h/o arthralgias and FM. Presents today with diagnosis of FMF and evaluation:  5/22/2023: Diagnosed with FMF in March 2022 by [facility]  Dr. Chris Cisneros after both daughter and granddaughter were found to have FMF. Was placed on Colchicine and titrated up to 3 pills at night and tolerating well without GI distress. Today endorses bl hand and wrist pain, stiffness, and cramps that is intermittent. Endorses morning stiffness lasts about 15 minutes, improves after activity.  Also endorses itchy, painful raised bumps to arms and legs that are intermittent, with last rash noted in March 2023. Endorsing joint pain and finger swelling when FMF flares - will get large raised spots associated with digit or limb swelling. Goes away within 7-10 days. Denies fever a/w rash but endorses abdominal pain, low back pain, and BLE pain. Has tried Tylenol and ibuprofen for pain relief which do not help.   Also endorses tick bite in 2011 - went to the ER and believes she was treated with antibiotics.  Does have a history of Hep B - seeing hepatology next week.  Had a bad case of COVID - hospitalized March 2020 but never intubated but did require supplemental oxygen for about 6 months.   FMF ROS: +arthralgias and stiffness to bl hands, wrists, knees. +intermittent rash to BUE and BLE, +chest pain that is reproducible, +intermittent abd pain   Denies fever, chills, weight loss, sob, crushing chest pain, nausea, vomiting, and diarrhea.    8/28/23 -- 2 FMF mild flares since last visit. + chronic hand, foot/LE, LBP pain, worse with use and weather, no synovitis. PRN Tylenol and Advil. Tolerating colchicine well.

## 2023-08-28 NOTE — ASSESSMENT
[FreeTextEntry1] : ANDREW STEPHENS is a 63 year old woman with a history of thyroid cancer in remission, Familial Mediterranean Fever, and Fibromyalgia presents today for evaluation of FMF --   # FMF: -Confirmed by [facility]  Dr. Chris Cisneros in March 2022 -history of abdominal pain and joint pains, worse with stress, and development of erysipelas to BUE and BLE -Tolerating Colchicine 3 pills at night but still having some flares tho milder - c/w this dose for now  -Could benefit from IL-1 inhibitor like Canakinumab or Anakinra, but patient wants time to think about this and log flares for 3 months  - Will order additional serologies to asses any underlying inflammation and disease activity   # Fibromylagia: -Difficult to assess how much of her pain is attributed to FMF vs Fibromyalgia, suspect if we get FMF under control, her Fibromyalgia pain will improve -In future, could consider Cymbalta or Gabapentin if pain appears more fibromyalgia  - check XR LS spine given LBP to see if any DJD   #Chronic Hep B  -Patient unclear if she has been vaccinated against Hep B, will order additional serologies in setting of possibility of immunosuppressive therapy  -Has follow up with Hepatology this week 5/25  RPA 3 months to discuss IL-1 treatment and FMF flares

## 2023-08-28 NOTE — PHYSICAL EXAM
[General Appearance - Alert] : alert [General Appearance - In No Acute Distress] : in no acute distress [General Appearance - Well Nourished] : well nourished [General Appearance - Well Developed] : well developed [Sclera] : the sclera and conjunctiva were normal [Outer Ear] : the ears and nose were normal in appearance [Oropharynx] : the oropharynx was normal [Neck Appearance] : the appearance of the neck was normal [Respiration, Rhythm And Depth] : normal respiratory rhythm and effort [Auscultation Breath Sounds / Voice Sounds] : lungs were clear to auscultation bilaterally [Heart Rate And Rhythm] : heart rate was normal and rhythm regular [Heart Sounds] : normal S1 and S2 [Murmurs] : no murmurs [Bowel Sounds] : normal bowel sounds [No CVA Tenderness] : no ~M costovertebral angle tenderness [No Spinal Tenderness] : no spinal tenderness [Abnormal Walk] : normal gait [Musculoskeletal - Swelling] : no joint swelling seen [Motor Tone] : muscle strength and tone were normal [Skin Color & Pigmentation] : normal skin color and pigmentation [] : no rash [Sensation] : the sensory exam was normal to light touch and pinprick [Oriented To Time, Place, And Person] : oriented to person, place, and time [Impaired Insight] : insight and judgment were intact [Affect] : the affect was normal [Mood] : the mood was normal [PERRL With Normal Accommodation] : pupils were equal in size, round, and reactive to light [Extraocular Movements] : extraocular movements were intact [Nasal Cavity] : the nasal mucosa and septum were normal [Neck Cervical Mass (___cm)] : no neck mass was observed [Edema] : there was no peripheral edema [Abdomen Soft] : soft [Abdomen Tenderness] : non-tender [Abdomen Mass (___ Cm)] : no abdominal mass palpated [FreeTextEntry1] : hyperpigmented areas to BLE, no active inflamamtory rashes  [No Focal Deficits] : no focal deficits

## 2023-10-02 NOTE — PATIENT PROFILE ADULT - FALL HARM RISK TYPE OF ASSESSMENT
admission Hemigard Postcare Instructions: The HEMIGARD strips are to remain completely dry for at least 5-7 days.

## 2023-10-11 LAB
ALBUMIN SERPL ELPH-MCNC: 4.3 G/DL
ALP BLD-CCNC: 53 U/L
ALT SERPL-CCNC: 17 U/L
ANION GAP SERPL CALC-SCNC: 12 MMOL/L
AST SERPL-CCNC: 19 U/L
BASOPHILS # BLD AUTO: 0.04 K/UL
BASOPHILS NFR BLD AUTO: 0.8 %
BILIRUB SERPL-MCNC: 0.2 MG/DL
BUN SERPL-MCNC: 22 MG/DL
CALCIUM SERPL-MCNC: 9.8 MG/DL
CHLORIDE SERPL-SCNC: 103 MMOL/L
CO2 SERPL-SCNC: 28 MMOL/L
CREAT SERPL-MCNC: 0.83 MG/DL
CRP SERPL-MCNC: 4 MG/L
EGFR: 80 ML/MIN/1.73M2
EOSINOPHIL # BLD AUTO: 0.11 K/UL
EOSINOPHIL NFR BLD AUTO: 2.1 %
ERYTHROCYTE [SEDIMENTATION RATE] IN BLOOD BY WESTERGREN METHOD: 34 MM/HR
GLUCOSE SERPL-MCNC: 99 MG/DL
HCT VFR BLD CALC: 39.8 %
HGB BLD-MCNC: 12.6 G/DL
IMM GRANULOCYTES NFR BLD AUTO: 0.2 %
LYMPHOCYTES # BLD AUTO: 1.84 K/UL
LYMPHOCYTES NFR BLD AUTO: 35.9 %
MAN DIFF?: NORMAL
MCHC RBC-ENTMCNC: 28.7 PG
MCHC RBC-ENTMCNC: 31.7 GM/DL
MCV RBC AUTO: 90.7 FL
MONOCYTES # BLD AUTO: 0.48 K/UL
MONOCYTES NFR BLD AUTO: 9.4 %
NEUTROPHILS # BLD AUTO: 2.64 K/UL
NEUTROPHILS NFR BLD AUTO: 51.6 %
PLATELET # BLD AUTO: 249 K/UL
POTASSIUM SERPL-SCNC: 4.6 MMOL/L
PROT SERPL-MCNC: 6.6 G/DL
RBC # BLD: 4.39 M/UL
RBC # FLD: 13.1 %
SODIUM SERPL-SCNC: 143 MMOL/L
WBC # FLD AUTO: 5.12 K/UL

## 2023-10-16 ENCOUNTER — NON-APPOINTMENT (OUTPATIENT)
Age: 63
End: 2023-10-16

## 2023-10-16 LAB
CHOLEST SERPL-MCNC: 214 MG/DL
ESTIMATED AVERAGE GLUCOSE: 128 MG/DL
HBA1C MFR BLD HPLC: 6.1 %
HDLC SERPL-MCNC: 77 MG/DL
LDLC SERPL CALC-MCNC: 126 MG/DL
NONHDLC SERPL-MCNC: 138 MG/DL
TRIGL SERPL-MCNC: 68 MG/DL

## 2023-11-08 ENCOUNTER — RESULT REVIEW (OUTPATIENT)
Age: 63
End: 2023-11-08

## 2023-11-08 ENCOUNTER — NON-APPOINTMENT (OUTPATIENT)
Age: 63
End: 2023-11-08

## 2023-11-08 ENCOUNTER — APPOINTMENT (OUTPATIENT)
Dept: NEUROLOGY | Facility: CLINIC | Age: 63
End: 2023-11-08
Payer: MEDICAID

## 2023-11-08 VITALS
HEART RATE: 83 BPM | DIASTOLIC BLOOD PRESSURE: 76 MMHG | HEIGHT: 67 IN | TEMPERATURE: 98.2 F | WEIGHT: 160 LBS | SYSTOLIC BLOOD PRESSURE: 118 MMHG | BODY MASS INDEX: 25.11 KG/M2

## 2023-11-08 DIAGNOSIS — G56.03 CARPAL TUNNEL SYNDROM,BILATERAL UPPER LIMBS: ICD-10-CM

## 2023-11-08 DIAGNOSIS — M79.672 PAIN IN LEFT FOOT: ICD-10-CM

## 2023-11-08 PROCEDURE — 99203 OFFICE O/P NEW LOW 30 MIN: CPT

## 2023-12-12 ENCOUNTER — APPOINTMENT (OUTPATIENT)
Dept: RADIOLOGY | Facility: CLINIC | Age: 63
End: 2023-12-12
Payer: MEDICAID

## 2023-12-12 ENCOUNTER — OUTPATIENT (OUTPATIENT)
Dept: OUTPATIENT SERVICES | Facility: HOSPITAL | Age: 63
LOS: 1 days | End: 2023-12-12
Payer: MEDICAID

## 2023-12-12 DIAGNOSIS — M79.672 PAIN IN LEFT FOOT: ICD-10-CM

## 2023-12-12 PROCEDURE — 73630 X-RAY EXAM OF FOOT: CPT

## 2023-12-12 PROCEDURE — 73630 X-RAY EXAM OF FOOT: CPT | Mod: 26,LT

## 2023-12-13 ENCOUNTER — APPOINTMENT (OUTPATIENT)
Dept: VASCULAR SURGERY | Facility: CLINIC | Age: 63
End: 2023-12-13
Payer: MEDICAID

## 2023-12-13 ENCOUNTER — APPOINTMENT (OUTPATIENT)
Dept: ENDOCRINOLOGY | Facility: CLINIC | Age: 63
End: 2023-12-13
Payer: MEDICAID

## 2023-12-13 VITALS
BODY MASS INDEX: 24.48 KG/M2 | WEIGHT: 156 LBS | HEIGHT: 67 IN | DIASTOLIC BLOOD PRESSURE: 68 MMHG | OXYGEN SATURATION: 97 % | SYSTOLIC BLOOD PRESSURE: 104 MMHG | HEART RATE: 91 BPM

## 2023-12-13 VITALS
WEIGHT: 156 LBS | RESPIRATION RATE: 16 BRPM | DIASTOLIC BLOOD PRESSURE: 86 MMHG | OXYGEN SATURATION: 97 % | HEIGHT: 67 IN | SYSTOLIC BLOOD PRESSURE: 128 MMHG | HEART RATE: 85 BPM | BODY MASS INDEX: 24.48 KG/M2

## 2023-12-13 DIAGNOSIS — I83.813 VARICOSE VEINS OF BILATERAL LOWER EXTREMITIES WITH PAIN: ICD-10-CM

## 2023-12-13 LAB
25(OH)D3 SERPL-MCNC: 40.6 NG/ML
ALBUMIN SERPL ELPH-MCNC: 4.5 G/DL
ALP BLD-CCNC: 52 U/L
ALT SERPL-CCNC: 19 U/L
ANION GAP SERPL CALC-SCNC: 13 MMOL/L
AST SERPL-CCNC: 22 U/L
BILIRUB SERPL-MCNC: 0.4 MG/DL
BUN SERPL-MCNC: 17 MG/DL
CALCIUM SERPL-MCNC: 10 MG/DL
CHLORIDE SERPL-SCNC: 102 MMOL/L
CO2 SERPL-SCNC: 27 MMOL/L
CREAT SERPL-MCNC: 0.81 MG/DL
EGFR: 83 ML/MIN/1.73M2
GLUCOSE SERPL-MCNC: 101 MG/DL
POTASSIUM SERPL-SCNC: 4.3 MMOL/L
PROT SERPL-MCNC: 7.3 G/DL
SODIUM SERPL-SCNC: 141 MMOL/L
T3 SERPL-MCNC: 113 NG/DL
T4 FREE SERPL-MCNC: 1.8 NG/DL
THYROGLOB AB SERPL-ACNC: <20 IU/ML
THYROGLOB AB SERPL-ACNC: <20 IU/ML
THYROGLOB SERPL-MCNC: <0.2 NG/ML
THYROPEROXIDASE AB SERPL IA-ACNC: 17 IU/ML
TSH SERPL-ACNC: 0.07 UIU/ML

## 2023-12-13 PROCEDURE — 99214 OFFICE O/P EST MOD 30 MIN: CPT

## 2023-12-13 PROCEDURE — 99203 OFFICE O/P NEW LOW 30 MIN: CPT

## 2023-12-13 PROCEDURE — 93970 EXTREMITY STUDY: CPT

## 2023-12-13 NOTE — PHYSICAL EXAM
[Alert] : alert [No Acute Distress] : no acute distress [PERRL] : pupils equal, round and reactive to light [No Proptosis] : no proptosis [No Neck Mass] : no neck mass was observed [No Thyroid Nodules] : no palpable thyroid nodules [Well Healed Scar] : well healed scar [No Respiratory Distress] : no respiratory distress [No Accessory Muscle Use] : no accessory muscle use [Clear to Auscultation] : lungs were clear to auscultation bilaterally [Normal S1, S2] : normal S1 and S2 [Normal Rate] : heart rate was normal [Regular Rhythm] : with a regular rhythm [Normal Supraclavicular Nodes] : no supraclavicular lymphadenopathy [Normal Anterior Cervical Nodes] : no anterior cervical lymphadenopathy [No Tremors] : no tremors [Oriented x3] : oriented to person, place, and time [Normal Affect] : the affect was normal [Normal Insight/Judgement] : insight and judgment were intact [Normal Mood] : the mood was normal

## 2023-12-13 NOTE — ASSESSMENT
[FreeTextEntry1] : Sarita is a 63 yr old female here for follow up of hypothyroidism , h/o thyroid PTC ,hypothyroidism,was seen by Dr. Duncan in oast.  She has h/o a hx of Thyroid Ca- TT in 2011, left lobe multifocal 0.7 -1.5 cm, no capsule invasion,  right 0.5 cm, positive LN, back ground Hashimotos.  s/p CHAVEZ 125 mCi IN 2011.  S/P LN dissection 8/8/14 for increased LN , positive cytology  s/p CHAVEZ 120 mci 7/2015 -wbs post Tx negative.  TG negative, TG ab positive.    On Levothyroxine 112 mcg qd.  TG and TG ab undetectable in 12/21 and in 5/23  Neck US in 12/21. Then in 6/23, stable.  Since she had recurrence in 2014, will recommend to keep TSH suppressed for 10 years below 0.1.   TSH 0.07, close to goal. Will try to keep it close to 0.1, for now Continue with same dose and repeat blood work in 3 mmonths.  TG and TG ab done in 5/23 undetectable. This visit TG pending.  will repeat next visit.  Discussed with patient how to take thyroid medication appropriately,empty stomach ,  all Multi vitamins 4 to 6 hrs away form thyroid medication, he voiced understanding.  Will get TG and TG ab next visit.  To continue MVI , calcium and vitamin D daily    RTC in 3 months   .
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
## 2023-12-13 NOTE — HISTORY OF PRESENT ILLNESS
[FreeTextEntry1] : Sarita is a 63 yr old female here for follow up of hypothyroidism , h/o thyroid  PTC ,hypothyroidism,was seen by Dr. Duncan in past. She has h/o  a hx of Thyroid Ca- TT  in 2011, left lobe multifocal 0.7 -1.5 cm, no capsule invasion, right 0.5 cm, positive LN, back ground Hashimoto's. s/p CHAVEZ 125 mCi  IN 2011. S/P LN dissection 8/8/14 for increased LN , positive cytology  s/p CHAVEZ 120 mci  7/2015 -wbs post Tx negative. TG negative, TG ab positive.  On Levothyroxine 112 mcg qd, has been on it for a  year now, dose was increased  from 100 mcg daily. No family h/o thyroid disorder or cancer.no h/o neck radiation.  Says her neck is still sensitive and painfull  No dysphagia, no SOB.Denies heat or cold intolerance, no weight changes, no constipation or diarrhea, no palpitations, energy level fair, no skin or hair changes.   Occasional chest pain mostly muscular, ont new ,  occasional chest palpitations not new, no shakiness , no tremors.  Gets occasional cramps in hands , not new. Takes MVI, calcium, vitamin D.

## 2023-12-14 ENCOUNTER — NON-APPOINTMENT (OUTPATIENT)
Age: 63
End: 2023-12-14

## 2023-12-18 ENCOUNTER — APPOINTMENT (OUTPATIENT)
Dept: RHEUMATOLOGY | Facility: CLINIC | Age: 63
End: 2023-12-18
Payer: MEDICAID

## 2023-12-18 VITALS
SYSTOLIC BLOOD PRESSURE: 110 MMHG | DIASTOLIC BLOOD PRESSURE: 60 MMHG | HEIGHT: 67 IN | BODY MASS INDEX: 24.17 KG/M2 | TEMPERATURE: 97 F | WEIGHT: 154 LBS | HEART RATE: 84 BPM | OXYGEN SATURATION: 98 %

## 2023-12-18 DIAGNOSIS — L30.1 DYSHIDROSIS [POMPHOLYX]: ICD-10-CM

## 2023-12-18 PROCEDURE — XXXXX: CPT | Mod: 1L

## 2023-12-18 RX ORDER — TRIAMCINOLONE ACETONIDE 0.25 MG/G
0.03 CREAM TOPICAL
Qty: 1 | Refills: 1 | Status: ACTIVE | COMMUNITY
Start: 2023-12-18 | End: 1900-01-01

## 2023-12-22 NOTE — ASSESSMENT
[FreeTextEntry1] : 64yo female with lower extremity edema and symptomativ varicose veins  secondary to chronic venous insufficiency; discussed management with patient  -20-30mmHg compression stockings to be worn daily; prescription given and explained proper wear and use -calf muscle exercises and leg elevation when possible -Monitor for unilateral swelling, redness and pain if any of these symptoms occur recommend calling office immediately as these are signs of DVT/SVT follow up in 3 months for stockings check

## 2023-12-22 NOTE — HISTORY OF PRESENT ILLNESS
[FreeTextEntry1] : Ms. ANDREW STEPHENS is a 63 year F  who presents for initial evaluation of _bilateral leg pain for the past _>12__ months.  Ms. STEPHENS leg discomfort is associated with b/l leg swelling, fatigue, heaviness, achiness, restlessness, and muscle cramping, and skin darkening at the ankles.  She complains of  _b/l lower calf__ painful varicose veins. Her symptoms have persisted despite conservative management with leg elevation, exercise, and over-the-counter medications (ibuprofen). She has not tried compression stockings. Her symptoms are aggravated by weight bearing, prolonged standing, and prolonged sitting. Her symptoms are alleviated by only  leg elevation. Her symptoms interfere with the her ADLs such as work, shopping and housekeeping (hosting and washing dishes).  She works and stands for prolonged periods of time with the inability to take frequent breaks to elevate their legs.   Ms. STEPHENS denies history of DVT/SVT/PE or other thromboembolic disease. She denies history of lower extremity claudication, rest pain, or tissue loss. She denies CAD, MI, DM, CRI, CVA, or TIA.  PMH significant for FMF and arthritis PSH significant for none

## 2023-12-22 NOTE — PHYSICAL EXAM
[JVD] : no jugular venous distention  [Normal Breath Sounds] : Normal breath sounds [Normal Rate and Rhythm] : normal rate and rhythm [2+] : left 2+ [Ankle Swelling (On Exam)] : present [Ankle Swelling Bilaterally] : bilaterally  [Ankle Swelling On The Left] : moderate [de-identified] : well appearing [de-identified] : wnl [FreeTextEntry1] : CEAP C-3 some very mild darkening with hemosiderin

## 2024-01-08 ENCOUNTER — APPOINTMENT (OUTPATIENT)
Dept: NEUROLOGY | Facility: CLINIC | Age: 64
End: 2024-01-08
Payer: MEDICAID

## 2024-01-08 PROCEDURE — 99213 OFFICE O/P EST LOW 20 MIN: CPT

## 2024-01-08 PROCEDURE — 95910 NRV CNDJ TEST 7-8 STUDIES: CPT

## 2024-01-08 NOTE — PHYSICAL EXAM
[General Appearance - Alert] : alert [General Appearance - In No Acute Distress] : in no acute distress [Person] : oriented to person [Place] : oriented to place [Time] : oriented to time [Cranial Nerves Optic (II)] : visual acuity intact bilaterally,  visual fields full to confrontation, pupils equal round and reactive to light [Cranial Nerves Oculomotor (III)] : extraocular motion intact [Cranial Nerves Trigeminal (V)] : facial sensation intact symmetrically [Cranial Nerves Facial (VII)] : face symmetrical [Cranial Nerves Vestibulocochlear (VIII)] : hearing was intact bilaterally [Cranial Nerves Glossopharyngeal (IX)] : tongue and palate midline [Cranial Nerves Accessory (XI - Cranial And Spinal)] : head turning and shoulder shrug symmetric [Cranial Nerves Hypoglossal (XII)] : there was no tongue deviation with protrusion [Motor Tone] : muscle tone was normal in all four extremities [Motor Strength] : muscle strength was normal in all four extremities [No Muscle Atrophy] : normal bulk in all four extremities [Motor Handedness Right-Handed] : the patient is right hand dominant [Sensation Tactile Decrease] : light touch was intact [Sensation Pain / Temperature Decrease] : pain and temperature was intact [Abnormal Walk] : normal gait [Balance] : balance was intact [Dysdiadochokinesia Bilaterally] : not present [Coordination - Dysmetria Impaired Finger-to-Nose Bilateral] : not present [2+] : Ankle jerk left 2+

## 2024-01-08 NOTE — DATA REVIEWED
[de-identified] : 	 EXAM: [id number] - XR FOOT COMP MIN 3 VIEWS LT  - ORDERED BY: WALE LAYNE   PROCEDURE DATE:  12/12/2023    INTERPRETATION:  CLINICAL INDICATION: Chronic left foot pain laterally.  TECHNIQUE:  3 views of left foot..  COMPARISON: No similar prior comparisons available.  FINDINGS: No acute fracture or dislocation. Joint spaces are grossly anatomic in alignment on this nonweightbearing examination. Bony mineralization within normal limits.  IMPRESSION: No acute fracture or dislocation.  	 EXAM: [id number] - XR LS SPINE FLEX EXT MIN 4V  - ORDERED BY: NATALEE HERRON   PROCEDURE DATE:  06/19/2023    INTERPRETATION:  CLINICAL INDICATION: low back pain  EXAM: AP neutral flexion extension lateral lumbosacral spine from 6/19/2023 at 0951. Compared to prior study from 8/7/2020.  IMPRESSION: No compression fractures.  Dynamically unstable slight L3 on L4 and L4 on L5 retrolistheses without spondylolysis defects. Remaining vertebral body alignment maintained.  Significantly narrowed L4-L5 and to slightly lesser extent posterior L3-L4 disc spaces. Preserved remaining intervertebral disc spaces. Broad trace levocurvature.  Unremarkable SI joints and partially visualized hips.  No lytic or blastic lesions.

## 2024-01-08 NOTE — DISCUSSION/SUMMARY
[FreeTextEntry1] : 63-year-old woman with complaints of bilateral hand numbness and tingling, electrodiagnostic study consistent with mild bilateral carpal tunnel syndrome. Reviewed and discussed study findings.  Advised to wear nighttime wrist brace. History of similar complaints in the feet although more on the left than the right.  Left follow-up at the end of the month. Presently will follow-up with rheumatology for her osteoarthritis, history of polymyalgia.

## 2024-01-08 NOTE — REVIEW OF SYSTEMS
[As Noted in HPI] : as noted in HPI [Numbness] : numbness [Tingling] : tingling [Arthralgias] : arthralgias [Joint Swelling] : joint swelling [Joint Stiffness] : joint stiffness [Negative] : Heme/Lymph

## 2024-01-08 NOTE — PROCEDURE
[FreeTextEntry1] : Nerve conduction study of the upper extremities demonstrates evidence of a mild sensory median nerve entrapment neuropathy at the wrist, consistent with a clinical diagnosis of bilateral carpal tunnel syndrome.

## 2024-01-08 NOTE — HISTORY OF PRESENT ILLNESS
[FreeTextEntry1] : 63-year-old woman complaining of pain, the hands, the wrist, occasionally numbness tingling feelings of the fingers, can occasionally wake up at night. History of similar pains and discomfort in the feet, recent x-ray of the foot showed no significant abnormalities. Prior x-rays of the lumbar spine showed degenerative changes. Followed by an rheumatologist,, with aches and pains of mainly the hands, wrist, occasionally the elbows, knees and now the feet.  Uses Tylenol, or analgesic ointments.

## 2024-01-31 ENCOUNTER — APPOINTMENT (OUTPATIENT)
Dept: NEUROLOGY | Facility: CLINIC | Age: 64
End: 2024-01-31
Payer: MEDICAID

## 2024-01-31 PROCEDURE — 95885 MUSC TST DONE W/NERV TST LIM: CPT

## 2024-01-31 PROCEDURE — 95910 NRV CNDJ TEST 7-8 STUDIES: CPT

## 2024-01-31 PROCEDURE — 99214 OFFICE O/P EST MOD 30 MIN: CPT

## 2024-01-31 NOTE — DISCUSSION/SUMMARY
[FreeTextEntry1] : 64-year-old woman with a history of chronic back pain, likely musculoskeletal.  Intermittent muscle cramps, no electrophysiologic evidence of an underlying neuropathy or radiculopathy. Plan: Will do a trial with gabapentin 100 mg at bedtime, if tolerable can increase to 200 mg p.o. nightly after 1 week. Can do some stretching before bedtime. Other options magnesium sulfate 200 to 300 mg twice a day with food. Return to office, 3 to 4 months.

## 2024-01-31 NOTE — HISTORY OF PRESENT ILLNESS
[FreeTextEntry1] : 64-year-old woman with a history of chronic back pain, recurrent leg pain, muscle spasms and cramps of the legs.  Symptoms wake her up at night. Findings ibuprofen, meloxicam are helpful but prefers not to take them.  Denies any radiating pain down the legs, no change in bowel bladder habits. X-ray performed June 2023 of the lower back demonstrated retrolisthesis of L3 on 4 and L4 on 5 without spondylitic effects. Narrow L4-5 and L3-4 disc spaces.

## 2024-01-31 NOTE — REVIEW OF SYSTEMS
[As Noted in HPI] : as noted in HPI [Abnormal Sensation] : an abnormal sensation [Arthralgias] : arthralgias [Limb Pain] : limb pain [Negative] : Heme/Lymph [FreeTextEntry9] : Chronic back pain

## 2024-01-31 NOTE — PHYSICAL EXAM
[General Appearance - Alert] : alert [General Appearance - In No Acute Distress] : in no acute distress [Person] : oriented to person [Place] : oriented to place [Time] : oriented to time [Cranial Nerves Optic (II)] : visual acuity intact bilaterally,  visual fields full to confrontation, pupils equal round and reactive to light [Cranial Nerves Oculomotor (III)] : extraocular motion intact [Cranial Nerves Trigeminal (V)] : facial sensation intact symmetrically [Cranial Nerves Facial (VII)] : face symmetrical [Cranial Nerves Vestibulocochlear (VIII)] : hearing was intact bilaterally [Cranial Nerves Glossopharyngeal (IX)] : tongue and palate midline [Cranial Nerves Accessory (XI - Cranial And Spinal)] : head turning and shoulder shrug symmetric [Cranial Nerves Hypoglossal (XII)] : there was no tongue deviation with protrusion [Motor Tone] : muscle tone was normal in all four extremities [Motor Strength] : muscle strength was normal in all four extremities [No Muscle Atrophy] : normal bulk in all four extremities [Motor Handedness Right-Handed] : the patient is right hand dominant [Sensation Tactile Decrease] : light touch was intact [Sensation Pain / Temperature Decrease] : pain and temperature was intact [Abnormal Walk] : normal gait [Balance] : balance was intact [Dysdiadochokinesia Bilaterally] : not present [Coordination - Dysmetria Impaired Finger-to-Nose Bilateral] : not present [2+] : Ankle jerk left 2+ [Plantar Reflex Right Only] : normal on the right [Plantar Reflex Left Only] : normal on the left [___] : absent on the right [___] : absent on the left

## 2024-01-31 NOTE — PROCEDURE
[FreeTextEntry1] : Electromyography and nerve conduction study of the lower extremities does not demonstrate evidence

## 2024-02-09 ENCOUNTER — LABORATORY RESULT (OUTPATIENT)
Age: 64
End: 2024-02-09

## 2024-02-09 LAB
ALBUMIN SERPL ELPH-MCNC: 4.5 G/DL
ALP BLD-CCNC: 55 U/L
ALT SERPL-CCNC: 20 U/L
ANION GAP SERPL CALC-SCNC: 13 MMOL/L
AST SERPL-CCNC: 23 U/L
BASOPHILS # BLD AUTO: 0.03 K/UL
BASOPHILS NFR BLD AUTO: 0.4 %
BILIRUB SERPL-MCNC: 0.2 MG/DL
BUN SERPL-MCNC: 21 MG/DL
CALCIUM SERPL-MCNC: 9.8 MG/DL
CHLORIDE SERPL-SCNC: 102 MMOL/L
CO2 SERPL-SCNC: 28 MMOL/L
CREAT SERPL-MCNC: 0.83 MG/DL
CRP SERPL-MCNC: 4 MG/L
EGFR: 80 ML/MIN/1.73M2
EOSINOPHIL # BLD AUTO: 0.1 K/UL
EOSINOPHIL NFR BLD AUTO: 1.5 %
ERYTHROCYTE [SEDIMENTATION RATE] IN BLOOD BY WESTERGREN METHOD: 47 MM/HR
GLUCOSE SERPL-MCNC: 96 MG/DL
HCT VFR BLD CALC: 40.5 %
HGB BLD-MCNC: 12.9 G/DL
IMM GRANULOCYTES NFR BLD AUTO: 0.1 %
LYMPHOCYTES # BLD AUTO: 2.17 K/UL
LYMPHOCYTES NFR BLD AUTO: 32 %
MAN DIFF?: NORMAL
MCHC RBC-ENTMCNC: 29.2 PG
MCHC RBC-ENTMCNC: 31.9 GM/DL
MCV RBC AUTO: 91.6 FL
MONOCYTES # BLD AUTO: 0.54 K/UL
MONOCYTES NFR BLD AUTO: 8 %
NEUTROPHILS # BLD AUTO: 3.94 K/UL
NEUTROPHILS NFR BLD AUTO: 58 %
PLATELET # BLD AUTO: 245 K/UL
POTASSIUM SERPL-SCNC: 4.5 MMOL/L
PROT SERPL-MCNC: 7.2 G/DL
RBC # BLD: 4.42 M/UL
RBC # FLD: 12.9 %
SODIUM SERPL-SCNC: 142 MMOL/L
WBC # FLD AUTO: 6.79 K/UL

## 2024-02-14 NOTE — ED ADULT TRIAGE NOTE - PAIN: PRESENCE, MLM
Patient called C/O straining her hip yesterday when shoveling snow.  Instructed patient to start Ibuprofen 800mg every 8 hours x 10 days then prn in addition to Zanaflex 4mg 1 tab po every 8 hours x 10 days then prn. Instructed patient to apply warm moist compresses 20 mins 4 x daily then apply muscle rub after compresses.     denies pain/discomfort

## 2024-03-06 ENCOUNTER — APPOINTMENT (OUTPATIENT)
Dept: VASCULAR SURGERY | Facility: CLINIC | Age: 64
End: 2024-03-06

## 2024-03-13 ENCOUNTER — APPOINTMENT (OUTPATIENT)
Dept: ENDOCRINOLOGY | Facility: CLINIC | Age: 64
End: 2024-03-13
Payer: MEDICAID

## 2024-03-13 VITALS
BODY MASS INDEX: 24.64 KG/M2 | HEIGHT: 67 IN | DIASTOLIC BLOOD PRESSURE: 82 MMHG | WEIGHT: 157 LBS | HEART RATE: 95 BPM | SYSTOLIC BLOOD PRESSURE: 130 MMHG | OXYGEN SATURATION: 96 %

## 2024-03-13 PROCEDURE — 99214 OFFICE O/P EST MOD 30 MIN: CPT

## 2024-03-13 NOTE — PHYSICAL EXAM
[Alert] : alert [No Acute Distress] : no acute distress [PERRL] : pupils equal, round and reactive to light [No Proptosis] : no proptosis [No Neck Mass] : no neck mass was observed [No Thyroid Nodules] : no palpable thyroid nodules [Well Healed Scar] : well healed scar [No Respiratory Distress] : no respiratory distress [Clear to Auscultation] : lungs were clear to auscultation bilaterally [No Accessory Muscle Use] : no accessory muscle use [Normal S1, S2] : normal S1 and S2 [Normal Rate] : heart rate was normal [Regular Rhythm] : with a regular rhythm [Normal Anterior Cervical Nodes] : no anterior cervical lymphadenopathy [Normal Posterior Cervical Nodes] : no posterior cervical lymphadenopathy [No Tremors] : no tremors [Oriented x3] : oriented to person, place, and time [Normal Insight/Judgement] : insight and judgment were intact [Normal Affect] : the affect was normal [Normal Mood] : the mood was normal
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
## 2024-03-13 NOTE — ASSESSMENT
[FreeTextEntry1] : Sarita is a 64  yr old female here for follow up of hypothyroidism , h/o thyroid PTC ,hypothyroidism,was seen by Dr. Duncan in past.  She has h/o a hx of Thyroid Ca- TT in 2011, left lobe multifocal 0.7 -1.5 cm, no capsule invasion,  right 0.5 cm, positive LN, back ground Hashimotos.  s/p CHAVEZ 125 mCi IN 2011.  S/P LN dissection 8/8/14 for increased LN , positive cytology  s/p CHAVEZ 120 mci 7/2015 -wbs post Tx negative.  TG negative    On Levothyroxine 112 mcg qd.  TG and TG ab undetectable in 12/21 and in 5/23  Neck US in 12/21. Then in 6/23, stable.  Since she had recurrence in 2014, will recommend to keep TSH suppressed for 10 years below 0.1.   TSH 0.06 in 2/24, at  goal. Will try to keep it close to 0.1,  for now Continue with same dose and repeat blood work in 3 months.  TG and TG ab done in 5/23 undetectable. TG and TG ab undetectable in 2/24.  will repeat next visit.  Discussed with patient how to take thyroid medication appropriately,empty stomach ,  all Multi vitamins 4 to 6 hrs away form thyroid medication, he voiced understanding.   To continue MVI , calcium and vitamin D daily  US next visit.  RTC in 6 months   .

## 2024-03-13 NOTE — HISTORY OF PRESENT ILLNESS
[FreeTextEntry1] : Sarita is a 64 yr old female here for follow up of hypothyroidism , h/o thyroid  PTC ,hypothyroidism,was seen by Dr. Duncan in past. She has h/o  a hx of Thyroid Ca- TT  in 2011, left lobe multifocal 0.7 -1.5 cm, no capsule invasion, right 0.5 cm, positive LN, back ground Hashimoto's. s/p CHAVEZ 125 mCi  IN 2011. S/P LN dissection 8/8/14 for increased LN , positive cytology  s/p CHAVEZ 120 mci  7/2015 -wbs post Tx negative. TG negative, TG ab positive.  On Levothyroxine 112 mcg qd, has been on it for a  year now, dose was increased  from 100 mcg daily. No family h/o thyroid disorder or cancer.no h/o neck radiation.  Says her neck is still sensitive and painful of and on.  Energy is fine, has good and bad days. No dysphagia, no SOB.  Denies heat or cold intolerance, no sig. weight changes, no constipation or diarrhea, no palpitations,no skin or hair changes.   Occasional chest pain mostly muscular, not new ,  occasional chest palpitations not new, no shakiness , no tremors.  Gets occasional cramps in hands , not new. Takes MVI, calcium, vitamin D.

## 2024-03-20 NOTE — PHYSICAL EXAM
[General Appearance - Alert] : alert [General Appearance - In No Acute Distress] : in no acute distress [General Appearance - Well Nourished] : well nourished [General Appearance - Well Developed] : well developed [Sclera] : the sclera and conjunctiva were normal [PERRL With Normal Accommodation] : pupils were equal in size, round, and reactive to light [Extraocular Movements] : extraocular movements were intact [Outer Ear] : the ears and nose were normal in appearance [Nasal Cavity] : the nasal mucosa and septum were normal [Oropharynx] : the oropharynx was normal [Neck Appearance] : the appearance of the neck was normal [Neck Cervical Mass (___cm)] : no neck mass was observed [Respiration, Rhythm And Depth] : normal respiratory rhythm and effort [Auscultation Breath Sounds / Voice Sounds] : lungs were clear to auscultation bilaterally [Heart Rate And Rhythm] : heart rate was normal and rhythm regular [Heart Sounds] : normal S1 and S2 [Murmurs] : no murmurs [Edema] : there was no peripheral edema [Bowel Sounds] : normal bowel sounds [Abdomen Soft] : soft [Abdomen Tenderness] : non-tender [Abdomen Mass (___ Cm)] : no abdominal mass palpated [No CVA Tenderness] : no ~M costovertebral angle tenderness [No Spinal Tenderness] : no spinal tenderness [Abnormal Walk] : normal gait [Musculoskeletal - Swelling] : no joint swelling seen [Motor Tone] : muscle strength and tone were normal [Skin Color & Pigmentation] : normal skin color and pigmentation [] : no rash [Motor Exam] : the motor exam was normal [Oriented To Time, Place, And Person] : oriented to person, place, and time [Impaired Insight] : insight and judgment were intact [Affect] : the affect was normal [Mood] : the mood was normal [FreeTextEntry1] : Decreased sensation to light touch over B/L feet

## 2024-03-20 NOTE — REVIEW OF SYSTEMS
[Fever] : no fever [Chills] : no chills [Arthralgias] : arthralgias [Joint Pain] : joint pain [Negative] : Heme/Lymph [Joint Swelling] : no joint swelling [Joint Stiffness] : no joint stiffness [As Noted in HPI] : as noted in HPI

## 2024-03-20 NOTE — HISTORY OF PRESENT ILLNESS
[FreeTextEntry1] : Initial Evaluation by Dr. Nguyen:  63 year old female with a history of thyroid cancer in remission presents at the request of her primary care physician for further evaluation of her arthralgias. Previously all her physicians were at [facility].  She states that she was in her usual state of health until April, at that time she was diagnosed with covid, she was in the hospital for a week but has recovered. She did not require intubation. She has been more achy since then. She complains of aches and pains in most of her joints, almost all her joints bother her. She rates the pain as mild to moderate depending on the day and activity level. She admits to stiffness in the mornings as well. She admits to poor sleep and wakes up multiple times at night.  She denies alopecia, oral ulcers, sicca symptoms or cold sensitivity. She denies rises or colitis. She denies asthma, frequent sinus infections or ear infections. There is no family history of autoimmunity.  She has an average appetite and denies weight loss. She denies fevers or chills or night sweats. She is otherwise up-to-date on her age appropriate screenings.  She went for physical therapy once. She uses Tylenol and NSAIDs, she also has a prescription for Mobic in the past and wants to know how she should use these medications. It turns out that she is on Seroquel but uses it every other day. She does not recall using Cymbalta. She rates her current symptoms around a 5/10. She admits to poor sleep and wakes up achy --------------------------------------------------------------------------------------- Last seen by Dr. Nguyen 9/21/2020 for h/o arthralgias and FM. Presents today with diagnosis of FMF and evaluation:  5/22/2023: Diagnosed with FMF in March 2022 by [facility]  Dr. Chris Cisneros after both daughter and granddaughter were found to have FMF. Was placed on Colchicine and titrated up to 3 pills at night and tolerating well without GI distress. Today endorses bl hand and wrist pain, stiffness, and cramps that is intermittent. Endorses morning stiffness lasts about 15 minutes, improves after activity.  Also endorses itchy, painful raised bumps to arms and legs that are intermittent, with last rash noted in March 2023. Endorsing joint pain and finger swelling when FMF flares - will get large raised spots associated with digit or limb swelling. Goes away within 7-10 days. Denies fever a/w rash but endorses abdominal pain, low back pain, and BLE pain. Has tried Tylenol and ibuprofen for pain relief which do not help.   Also endorses tick bite in 2011 - went to the ER and believes she was treated with antibiotics.  Does have a history of Hep B - seeing hepatology next week.  Had a bad case of COVID - hospitalized March 2020 but never intubated but did require supplemental oxygen for about 6 months.   FMF ROS: +arthralgias and stiffness to bl hands, wrists, knees. +intermittent rash to BUE and BLE, +chest pain that is reproducible, +intermittent abd pain   Denies fever, chills, weight loss, sob, crushing chest pain, nausea, vomiting, and diarrhea.    8/28/23 -- 2 FMF mild flares since last visit. + chronic hand, foot/LE, LBP pain, worse with use and weather, no synovitis. PRN Tylenol and Advil, some muscle spasm too. Tolerating colchicine well.   12/18/23 -- Doing better on increased colchicine dose, 1 flare but milder sx, feeling well today from FMF and FMS standpoint. Mild recurrence of eczematous rashes.

## 2024-03-20 NOTE — ASSESSMENT
[FreeTextEntry1] : ANDREW STEPHENS is a 63 year old woman with a history of thyroid cancer in remission, Familial Mediterranean Fever, and Fibromyalgia presents today for evaluation of FMF --  # FMF - confirmed by [facility]  Dr. Chris Cisneros in March 2022. Hx of abdominal pain and joint pains, worse with stress, and development of erysipelas to BUE and BLE - c/w colchicine 3-4 tabs daily - pt self adjusting dose with efficacy  - If flaring despite above, will need to consider trial of IL-1 inhibitor like Canakinumab or Anakinra - Recent labs stable and reviewed with her, repeat prior to next visit  # FMS, some muscle spasm - c/w QHS flexeril prn  - c/w stretching, light aerobic exercises, massage, heat as adjuncts for FMS pain. Discussed nonpharmacologic FMS therapies as well - encouraged to incorporate small activities that she finds beneficial during her day, optimize stretching and light exercise, and be mindful of mood, be aware not to overextend on days she has less pain, ensure adequate rest between strenuous activities. Patient verbalized understanding. -In future, could consider Cymbalta or Gabapentin if pain appears more fibromyalgia  - no need today   # L spine known DJD, LE paresthesia  - c/w back brace prn with prolonged standing - HEP - neurology referral if worsening paresthesia   # Chronic Hep B - c/w Hepatology f/u, will need treatment if more potent immunosuppressives needed  # Eczema - topical cream - if not improving in next 4 weeks, advised derm eval   RTC 4 months

## 2024-03-27 ENCOUNTER — RX RENEWAL (OUTPATIENT)
Age: 64
End: 2024-03-27

## 2024-03-27 RX ORDER — GABAPENTIN 100 MG/1
100 CAPSULE ORAL
Qty: 60 | Refills: 2 | Status: ACTIVE | COMMUNITY
Start: 2024-01-31 | End: 1900-01-01

## 2024-04-04 ENCOUNTER — APPOINTMENT (OUTPATIENT)
Dept: INTERNAL MEDICINE | Facility: CLINIC | Age: 64
End: 2024-04-04
Payer: MEDICAID

## 2024-04-04 ENCOUNTER — LABORATORY RESULT (OUTPATIENT)
Age: 64
End: 2024-04-04

## 2024-04-04 VITALS
WEIGHT: 155 LBS | HEART RATE: 87 BPM | TEMPERATURE: 98.1 F | HEIGHT: 67 IN | SYSTOLIC BLOOD PRESSURE: 120 MMHG | OXYGEN SATURATION: 95 % | BODY MASS INDEX: 24.33 KG/M2 | DIASTOLIC BLOOD PRESSURE: 72 MMHG

## 2024-04-04 DIAGNOSIS — R73.03 PREDIABETES.: ICD-10-CM

## 2024-04-04 DIAGNOSIS — B18.1 CHRONIC VIRAL HEPATITIS B W/OUT DELTA-AGENT: ICD-10-CM

## 2024-04-04 DIAGNOSIS — E03.9 HYPOTHYROIDISM, UNSPECIFIED: ICD-10-CM

## 2024-04-04 DIAGNOSIS — F32.A DEPRESSION, UNSPECIFIED: ICD-10-CM

## 2024-04-04 DIAGNOSIS — E78.5 HYPERLIPIDEMIA, UNSPECIFIED: ICD-10-CM

## 2024-04-04 DIAGNOSIS — C73 MALIGNANT NEOPLASM OF THYROID GLAND: ICD-10-CM

## 2024-04-04 PROCEDURE — G2211 COMPLEX E/M VISIT ADD ON: CPT | Mod: NC,1L

## 2024-04-04 PROCEDURE — 99214 OFFICE O/P EST MOD 30 MIN: CPT

## 2024-04-04 NOTE — ASSESSMENT
[FreeTextEntry1] : 1.FMF: Continue follow-up with rheumatology, continue on colchicine 0.6 mg 2 tablets twice daily.  Continue following up with rheumatology for yearly CBC CMP vitamin B12 and urine protein creatinine ratio.  2.hypothyroidism with history of thyroid CA: TFTs done by endocrinology, continue on levothyroxine 112 mcg.  He is overdue to follow-up with head and neck surgeon in [place].  3.chronic hepatitis B: Advised following up with hepatology as she is due for this at this time.  4 neuropathic pain of feet: Advised to start on gabapentin 100 mg 1 to 2 capsules at bedtime per neurology.  5.overactive bladder: Advise rechecking urine testing today and continue on tolterodine 2 mg once daily as continues to have symptoms.

## 2024-04-04 NOTE — HISTORY OF PRESENT ILLNESS
[FreeTextEntry1] :  Follow Up Visit [de-identified] : ANDREW STEPHENS is a 64 year F who comes in for a follow up visit. Pt did follow up with endocrine and still on levothyroxine increased to 112 mcg as goal is to keep TSH < 0.1. Pt also f/u with rheum on FMF and was offered IL-1 inhibitor over colchicine but pt wants to stay on oral medication.  Pt also established with hepatology and labs done. She did see neurology and no evidence of neuropathy but advised to take gabapentin 100-200 mg for symptoms for radiculopathy, but pt read label and did not take medication.  Patient denies any cp, sob,abdominal pain, nausea, vomiting, palpitations, fever, chills, constipation, diarrhea.

## 2024-04-11 ENCOUNTER — NON-APPOINTMENT (OUTPATIENT)
Age: 64
End: 2024-04-11

## 2024-04-11 LAB
APPEARANCE: CLEAR
BILIRUBIN URINE: NEGATIVE
BLOOD URINE: ABNORMAL
CHOLEST SERPL-MCNC: 208 MG/DL
COLOR: YELLOW
ESTIMATED AVERAGE GLUCOSE: 123 MG/DL
GLUCOSE QUALITATIVE U: NEGATIVE MG/DL
HBA1C MFR BLD HPLC: 5.9 %
HDLC SERPL-MCNC: 76 MG/DL
KETONES URINE: NEGATIVE MG/DL
LDLC SERPL CALC-MCNC: 114 MG/DL
LEUKOCYTE ESTERASE URINE: NEGATIVE
NITRITE URINE: NEGATIVE
NONHDLC SERPL-MCNC: 132 MG/DL
PH URINE: 5
PROTEIN URINE: NEGATIVE MG/DL
SPECIFIC GRAVITY URINE: 1.01
TRIGL SERPL-MCNC: 105 MG/DL
UROBILINOGEN URINE: 0.2 MG/DL

## 2024-04-15 ENCOUNTER — APPOINTMENT (OUTPATIENT)
Dept: RHEUMATOLOGY | Facility: CLINIC | Age: 64
End: 2024-04-15

## 2024-04-15 VITALS
BODY MASS INDEX: 24.01 KG/M2 | WEIGHT: 153 LBS | OXYGEN SATURATION: 96 % | TEMPERATURE: 97.6 F | HEIGHT: 67 IN | DIASTOLIC BLOOD PRESSURE: 68 MMHG | SYSTOLIC BLOOD PRESSURE: 116 MMHG | HEART RATE: 86 BPM

## 2024-04-15 DIAGNOSIS — G57.93 UNSPECIFIED MONONEUROPATHY OF BILATERAL LOWER LIMBS: ICD-10-CM

## 2024-04-15 DIAGNOSIS — M15.9 POLYOSTEOARTHRITIS, UNSPECIFIED: ICD-10-CM

## 2024-04-15 DIAGNOSIS — R31.9 HEMATURIA, UNSPECIFIED: ICD-10-CM

## 2024-04-15 DIAGNOSIS — M79.7 FIBROMYALGIA: ICD-10-CM

## 2024-04-15 DIAGNOSIS — M04.1 PERIODIC FEVER SYNDROMES: ICD-10-CM

## 2024-04-15 PROCEDURE — XXXXX: CPT | Mod: 1L

## 2024-04-15 RX ORDER — AMMONIUM LACTATE 12 %
12 CREAM (GRAM) TOPICAL TWICE DAILY
Qty: 1 | Refills: 1 | Status: ACTIVE | COMMUNITY
Start: 2023-08-08 | End: 1900-01-01

## 2024-04-15 RX ORDER — CYCLOBENZAPRINE HYDROCHLORIDE 5 MG/1
5 TABLET, FILM COATED ORAL
Qty: 30 | Refills: 1 | Status: ACTIVE | COMMUNITY
Start: 2023-08-28 | End: 1900-01-01

## 2024-05-22 LAB
APPEARANCE: CLEAR
BACTERIA UR CULT: NORMAL
BACTERIA: NEGATIVE /HPF
BILIRUBIN URINE: NEGATIVE
BLOOD URINE: NEGATIVE
CAST: 0 /LPF
COLOR: YELLOW
EPITHELIAL CELLS: 0 /HPF
GLUCOSE QUALITATIVE U: NEGATIVE MG/DL
KETONES URINE: NEGATIVE MG/DL
LEUKOCYTE ESTERASE URINE: NEGATIVE
MICROSCOPIC-UA: NORMAL
NITRITE URINE: NEGATIVE
PH URINE: 6.5
PROTEIN URINE: NEGATIVE MG/DL
RED BLOOD CELLS URINE: 0 /HPF
SPECIFIC GRAVITY URINE: 1.01
UROBILINOGEN URINE: 0.2 MG/DL
WHITE BLOOD CELLS URINE: 0 /HPF

## 2024-05-29 RX ORDER — TOLTERODINE TARTARATE 2 MG/1
2 TABLET ORAL
Qty: 90 | Refills: 1 | Status: ACTIVE | COMMUNITY
Start: 2021-07-23 | End: 1900-01-01

## 2024-05-29 RX ORDER — OMEPRAZOLE 40 MG/1
40 CAPSULE, DELAYED RELEASE ORAL TWICE DAILY
Qty: 180 | Refills: 1 | Status: ACTIVE | COMMUNITY
Start: 1900-01-01 | End: 1900-01-01

## 2024-06-30 NOTE — REVIEW OF SYSTEMS
[Arthralgias] : arthralgias [Joint Pain] : joint pain [As Noted in HPI] : as noted in HPI [Negative] : Heme/Lymph [Joint Swelling] : no joint swelling [Joint Stiffness] : no joint stiffness

## 2024-06-30 NOTE — ASSESSMENT
[FreeTextEntry1] : ANDREW STEPHENS is a 63 year old woman with a history of thyroid cancer in remission, Familial Mediterranean Fever, and Fibromyalgia presents today for evaluation of FMF --  # FMF - confirmed by [facility]  Dr. Chris Cisneros in March 2022. Hx of abdominal pain and joint pains, worse with stress, and development of erysipelas to BUE and BLE - c/w colchicine 3-4 tabs daily - pt self adjusting dose with efficacy  - If flaring despite above, will need to consider trial of IL-1 inhibitor like Canakinumab or Anakinra - Recent labs stable and reviewed with her, repeat prior to next visit  # FMS, some muscle spasm - c/w QHS flexeril prn  - c/w stretching, light aerobic exercises, massage, heat as adjuncts for FMS pain. Discussed nonpharmacologic FMS therapies as well - encouraged to incorporate small activities that she finds beneficial during her day, optimize stretching and light exercise, and be mindful of mood, be aware not to overextend on days she has less pain, ensure adequate rest between strenuous activities. Patient verbalized understanding. -In future, could consider Cymbalta or Gabapentin if pain appears more fibromyalgia  - no need today   # L spine known DJD, LE paresthesia  - c/w back brace prn with prolonged standing - HEP - neurology referral if worsening paresthesia   # Chronic Hep B - c/w Hepatology f/u, will need treatment if more potent immunosuppressives needed  # Eczema - topical cream - if not improving in next 4 weeks, advised derm eval   RTC 4 months

## 2024-06-30 NOTE — HISTORY OF PRESENT ILLNESS
[FreeTextEntry1] : Initial Evaluation by Dr. Nguyen:  63 year old female with a history of thyroid cancer in remission presents at the request of her primary care physician for further evaluation of her arthralgias. Previously all her physicians were at [facility].  She states that she was in her usual state of health until April, at that time she was diagnosed with covid, she was in the hospital for a week but has recovered. She did not require intubation. She has been more achy since then. She complains of aches and pains in most of her joints, almost all her joints bother her. She rates the pain as mild to moderate depending on the day and activity level. She admits to stiffness in the mornings as well. She admits to poor sleep and wakes up multiple times at night.  She denies alopecia, oral ulcers, sicca symptoms or cold sensitivity. She denies rises or colitis. She denies asthma, frequent sinus infections or ear infections. There is no family history of autoimmunity.  She has an average appetite and denies weight loss. She denies fevers or chills or night sweats. She is otherwise up-to-date on her age appropriate screenings.  She went for physical therapy once. She uses Tylenol and NSAIDs, she also has a prescription for Mobic in the past and wants to know how she should use these medications. It turns out that she is on Seroquel but uses it every other day. She does not recall using Cymbalta. She rates her current symptoms around a 5/10. She admits to poor sleep and wakes up achy --------------------------------------------------------------------------------------- Last seen by Dr. Nguyen 9/21/2020 for h/o arthralgias and FM. Presents today with diagnosis of FMF and evaluation:  5/22/2023: Diagnosed with FMF in March 2022 by [facility]  Dr. Chris Cisneros after both daughter and granddaughter were found to have FMF. Was placed on Colchicine and titrated up to 3 pills at night and tolerating well without GI distress. Today endorses bl hand and wrist pain, stiffness, and cramps that is intermittent. Endorses morning stiffness lasts about 15 minutes, improves after activity.  Also endorses itchy, painful raised bumps to arms and legs that are intermittent, with last rash noted in March 2023. Endorsing joint pain and finger swelling when FMF flares - will get large raised spots associated with digit or limb swelling. Goes away within 7-10 days. Denies fever a/w rash but endorses abdominal pain, low back pain, and BLE pain. Has tried Tylenol and ibuprofen for pain relief which do not help.   Also endorses tick bite in 2011 - went to the ER and believes she was treated with antibiotics.  Does have a history of Hep B - seeing hepatology next week.  Had a bad case of COVID - hospitalized March 2020 but never intubated but did require supplemental oxygen for about 6 months.   FMF ROS: +arthralgias and stiffness to bl hands, wrists, knees. +intermittent rash to BUE and BLE, +chest pain that is reproducible, +intermittent abd pain   Denies fever, chills, weight loss, sob, crushing chest pain, nausea, vomiting, and diarrhea.    8/28/23 -- 2 FMF mild flares since last visit. + chronic hand, foot/LE, LBP pain, worse with use and weather, no synovitis. PRN Tylenol and Advil, some muscle spasm too. Tolerating colchicine well.   12/18/23 -- Doing better on increased colchicine dose, 1 flare but milder sx, feeling well today from FMF and FMS standpoint. Mild recurrence of eczematous rashes.   4/15/24 --

## 2024-06-30 NOTE — REASON FOR VISIT
[Follow-Up: _____] : a [unfilled] follow-up visit [Consultation] : a consultation visit [FreeTextEntry1] : F

## 2024-06-30 NOTE — PHYSICAL EXAM
[General Appearance - Alert] : alert [General Appearance - In No Acute Distress] : in no acute distress [General Appearance - Well Nourished] : well nourished [General Appearance - Well Developed] : well developed [Sclera] : the sclera and conjunctiva were normal [PERRL With Normal Accommodation] : pupils were equal in size, round, and reactive to light [Extraocular Movements] : extraocular movements were intact [Outer Ear] : the ears and nose were normal in appearance [Nasal Cavity] : the nasal mucosa and septum were normal [Oropharynx] : the oropharynx was normal [Neck Appearance] : the appearance of the neck was normal [Neck Cervical Mass (___cm)] : no neck mass was observed [Respiration, Rhythm And Depth] : normal respiratory rhythm and effort [Auscultation Breath Sounds / Voice Sounds] : lungs were clear to auscultation bilaterally [Heart Rate And Rhythm] : heart rate was normal and rhythm regular [Heart Sounds] : normal S1 and S2 [Murmurs] : no murmurs [Edema] : there was no peripheral edema [Bowel Sounds] : normal bowel sounds [Abdomen Soft] : soft [Abdomen Tenderness] : non-tender [Abdomen Mass (___ Cm)] : no abdominal mass palpated [No CVA Tenderness] : no ~M costovertebral angle tenderness [No Spinal Tenderness] : no spinal tenderness [Skin Color & Pigmentation] : normal skin color and pigmentation [] : no rash [Motor Exam] : the motor exam was normal [Oriented To Time, Place, And Person] : oriented to person, place, and time [Impaired Insight] : insight and judgment were intact [Affect] : the affect was normal [Mood] : the mood was normal [Abnormal Walk] : normal gait [Musculoskeletal - Swelling] : no joint swelling seen [Motor Tone] : muscle strength and tone were normal [FreeTextEntry1] : fROM throughout, no synovitis or enthesitis

## 2024-08-12 ENCOUNTER — APPOINTMENT (OUTPATIENT)
Dept: RHEUMATOLOGY | Facility: CLINIC | Age: 64
End: 2024-08-12
Payer: COMMERCIAL

## 2024-08-12 VITALS
HEIGHT: 67 IN | OXYGEN SATURATION: 94 % | WEIGHT: 153 LBS | BODY MASS INDEX: 24.01 KG/M2 | TEMPERATURE: 97.8 F | DIASTOLIC BLOOD PRESSURE: 72 MMHG | SYSTOLIC BLOOD PRESSURE: 100 MMHG | HEART RATE: 88 BPM

## 2024-08-12 DIAGNOSIS — R21 RASH AND OTHER NONSPECIFIC SKIN ERUPTION: ICD-10-CM

## 2024-08-12 DIAGNOSIS — G89.29 LOW BACK PAIN, UNSPECIFIED: ICD-10-CM

## 2024-08-12 DIAGNOSIS — M54.50 LOW BACK PAIN, UNSPECIFIED: ICD-10-CM

## 2024-08-12 DIAGNOSIS — R68.2 DRY MOUTH, UNSPECIFIED: ICD-10-CM

## 2024-08-12 DIAGNOSIS — M79.7 FIBROMYALGIA: ICD-10-CM

## 2024-08-12 DIAGNOSIS — M04.1 PERIODIC FEVER SYNDROMES: ICD-10-CM

## 2024-08-12 PROCEDURE — G2211 COMPLEX E/M VISIT ADD ON: CPT

## 2024-08-12 PROCEDURE — 99214 OFFICE O/P EST MOD 30 MIN: CPT

## 2024-08-12 NOTE — ASSESSMENT
[FreeTextEntry1] : ANDREW STEPHENS is a 64 year old woman with a history of thyroid cancer in remission, Familial Mediterranean Fever, and Fibromyalgia presents today for evaluation of FMF --  # FMF - confirmed by [facility]  Dr. Chris Cisneros in March 2022. Hx of abdominal pain and joint pains, worse with stress, and development of erysipelas to BUE and BLE - c/w colchicine 3-4 tabs daily - marked improvement in flare activity with these doses, tolerating well  - If flaring despite above, will need to consider trial of IL-1 inhibitor like Canakinumab or Anakinra but these carry higher risk ratio  - Recent labs stable and reviewed with her, repeat prior to next visit  # FMS, some muscle spasm - c/w QHS flexeril prn  - c/w stretching, light aerobic exercises, massage, heat as adjuncts for FMS pain. Discussed nonpharmacologic FMS therapies as well - encouraged to incorporate small activities that she finds beneficial during her day, optimize stretching and light exercise, and be mindful of mood, be aware not to overextend on days she has less pain, ensure adequate rest between strenuous activities. Patient verbalized understanding. -In future, could consider Cymbalta or Gabapentin if pain appears more fibromyalgia  - no need today   # L spine known DJD, LE paresthesia  - c/w back brace prn with prolonged standing - HEP - neurology referral if worsening paresthesia   # Chronic Hep B - c/w Hepatology f/u, will need treatment if more potent immunosuppressives needed  # Eczema, other rashes - has used ammonium lactate as below with benefit in past - c/w PRN  RTC 4 months

## 2024-08-12 NOTE — REVIEW OF SYSTEMS
[Arthralgias] : arthralgias [Joint Pain] : joint pain [Negative] : Heme/Lymph [As Noted in HPI] : as noted in HPI [Joint Swelling] : no joint swelling [Joint Stiffness] : no joint stiffness [de-identified] : radicular LBP  [FreeTextEntry4] : dry mouth

## 2024-08-12 NOTE — HISTORY OF PRESENT ILLNESS
[FreeTextEntry1] : Initial Evaluation by Dr. Nguyen:  63 year old female with a history of thyroid cancer in remission presents at the request of her primary care physician for further evaluation of her arthralgias. Previously all her physicians were at [facility].  She states that she was in her usual state of health until April, at that time she was diagnosed with covid, she was in the hospital for a week but has recovered. She did not require intubation. She has been more achy since then. She complains of aches and pains in most of her joints, almost all her joints bother her. She rates the pain as mild to moderate depending on the day and activity level. She admits to stiffness in the mornings as well. She admits to poor sleep and wakes up multiple times at night.  She denies alopecia, oral ulcers, sicca symptoms or cold sensitivity. She denies rises or colitis. She denies asthma, frequent sinus infections or ear infections. There is no family history of autoimmunity.  She has an average appetite and denies weight loss. She denies fevers or chills or night sweats. She is otherwise up-to-date on her age appropriate screenings.  She went for physical therapy once. She uses Tylenol and NSAIDs, she also has a prescription for Mobic in the past and wants to know how she should use these medications. It turns out that she is on Seroquel but uses it every other day. She does not recall using Cymbalta. She rates her current symptoms around a 5/10. She admits to poor sleep and wakes up achy --------------------------------------------------------------------------------------- Last seen by Dr. Nguyen 9/21/2020 for h/o arthralgias and FM. Presents today with diagnosis of FMF and evaluation:  5/22/2023: Diagnosed with FMF in March 2022 by [facility]  Dr. Chris Cisneros after both daughter and granddaughter were found to have FMF. Was placed on Colchicine and titrated up to 3 pills at night and tolerating well without GI distress. Today endorses bl hand and wrist pain, stiffness, and cramps that is intermittent. Endorses morning stiffness lasts about 15 minutes, improves after activity.  Also endorses itchy, painful raised bumps to arms and legs that are intermittent, with last rash noted in March 2023. Endorsing joint pain and finger swelling when FMF flares - will get large raised spots associated with digit or limb swelling. Goes away within 7-10 days. Denies fever a/w rash but endorses abdominal pain, low back pain, and BLE pain. Has tried Tylenol and ibuprofen for pain relief which do not help.   Also endorses tick bite in 2011 - went to the ER and believes she was treated with antibiotics.  Does have a history of Hep B - seeing hepatology next week.  Had a bad case of COVID - hospitalized March 2020 but never intubated but did require supplemental oxygen for about 6 months.   FMF ROS: +arthralgias and stiffness to bl hands, wrists, knees. +intermittent rash to BUE and BLE, +chest pain that is reproducible, +intermittent abd pain   Denies fever, chills, weight loss, sob, crushing chest pain, nausea, vomiting, and diarrhea.    8/28/23 -- 2 FMF mild flares since last visit. + chronic hand, foot/LE, LBP pain, worse with use and weather, no synovitis. PRN Tylenol and Advil, some muscle spasm too. Tolerating colchicine well.   12/18/23 -- Doing better on increased colchicine dose, 1 flare but milder sx, feeling well today from FMF and FMS standpoint. Mild recurrence of eczematous rashes.   4/15/24 -- Continues to do much better on increased colchicine dosing, no SE, no flares since last visit, no infectious sx. FMS pain slightly more active but manageable with conservative measures.   8/12/24 -- Pt flared with had to cut down to 2 tabs colchicine 2/2 insurance not covering it, reports insurance just approved colchicine 4 tabs daily so will go to . FMS pain stable, PRN gabapentin partially helping but makes her sedated if takes in AM. Mild dry mouth, papular painless rashes scattered around.

## 2024-08-12 NOTE — ASSESSMENT
[FreeTextEntry1] : ANDREW STEPHENS is a 64 year old woman with a history of thyroid cancer in remission, Familial Mediterranean Fever, and Fibromyalgia presents today for evaluation of FMF --  # FMF - confirmed by [facility]  Dr. Chris Cisneros in March 2022. Hx of abdominal pain and joint pains, worse with stress, and development of erysipelas to BUE and BLE - c/w colchicine 3-4 tabs daily - marked improvement in flare activity with these doses, tolerating well  - If flaring despite above, will need to consider trial of IL-1 inhibitor like Canakinumab or Anakinra but these carry higher risk ratio  - Recent labs stable and reviewed with her, repeat prior to next visit  # FMS, some muscle spasm - c/w QHS flexeril prn  - c/w stretching, light aerobic exercises, massage, heat as adjuncts for FMS pain. Discussed nonpharmacologic FMS therapies as well - encouraged to incorporate small activities that she finds beneficial during her day, optimize stretching and light exercise, and be mindful of mood, be aware not to overextend on days she has less pain, ensure adequate rest between strenuous activities. Patient verbalized understanding. -In future, could consider Cymbalta or Gabapentin if pain appears more fibromyalgia  - no need today   # L spine known DJD, LE paresthesia  - c/w back brace prn with prolonged standing - HEP - neurology referral if worsening paresthesia   # Chronic Hep B - c/w Hepatology f/u, will need treatment if more potent immunosuppressives needed  # Eczema, other rashes - has used ammonium lactate as below with benefit in past - c/w PRN  RTC 4 months

## 2024-08-12 NOTE — REASON FOR VISIT
[Consultation] : a consultation visit [Follow-Up: _____] : a [unfilled] follow-up visit [FreeTextEntry1] : F

## 2024-08-12 NOTE — HISTORY OF PRESENT ILLNESS
[FreeTextEntry1] : Initial Evaluation by Dr. Nguyen:  63 year old female with a history of thyroid cancer in remission presents at the request of her primary care physician for further evaluation of her arthralgias. Previously all her physicians were at [facility].  She states that she was in her usual state of health until April, at that time she was diagnosed with covid, she was in the hospital for a week but has recovered. She did not require intubation. She has been more achy since then. She complains of aches and pains in most of her joints, almost all her joints bother her. She rates the pain as mild to moderate depending on the day and activity level. She admits to stiffness in the mornings as well. She admits to poor sleep and wakes up multiple times at night.  She denies alopecia, oral ulcers, sicca symptoms or cold sensitivity. She denies rises or colitis. She denies asthma, frequent sinus infections or ear infections. There is no family history of autoimmunity.  She has an average appetite and denies weight loss. She denies fevers or chills or night sweats. She is otherwise up-to-date on her age appropriate screenings.  She went for physical therapy once. She uses Tylenol and NSAIDs, she also has a prescription for Mobic in the past and wants to know how she should use these medications. It turns out that she is on Seroquel but uses it every other day. She does not recall using Cymbalta. She rates her current symptoms around a 5/10. She admits to poor sleep and wakes up achy --------------------------------------------------------------------------------------- Last seen by Dr. Nguyen 9/21/2020 for h/o arthralgias and FM. Presents today with diagnosis of FMF and evaluation:  5/22/2023: Diagnosed with FMF in March 2022 by [facility]  Dr. Chris Cisneros after both daughter and granddaughter were found to have FMF. Was placed on Colchicine and titrated up to 3 pills at night and tolerating well without GI distress. Today endorses bl hand and wrist pain, stiffness, and cramps that is intermittent. Endorses morning stiffness lasts about 15 minutes, improves after activity.  Also endorses itchy, painful raised bumps to arms and legs that are intermittent, with last rash noted in March 2023. Endorsing joint pain and finger swelling when FMF flares - will get large raised spots associated with digit or limb swelling. Goes away within 7-10 days. Denies fever a/w rash but endorses abdominal pain, low back pain, and BLE pain. Has tried Tylenol and ibuprofen for pain relief which do not help.   Also endorses tick bite in 2011 - went to the ER and believes she was treated with antibiotics.  Does have a history of Hep B - seeing hepatology next week.  Had a bad case of COVID - hospitalized March 2020 but never intubated but did require supplemental oxygen for about 6 months.   FMF ROS: +arthralgias and stiffness to bl hands, wrists, knees. +intermittent rash to BUE and BLE, +chest pain that is reproducible, +intermittent abd pain   Denies fever, chills, weight loss, sob, crushing chest pain, nausea, vomiting, and diarrhea.    8/28/23 -- 2 FMF mild flares since last visit. + chronic hand, foot/LE, LBP pain, worse with use and weather, no synovitis. PRN Tylenol and Advil, some muscle spasm too. Tolerating colchicine well.   12/18/23 -- Doing better on increased colchicine dose, 1 flare but milder sx, feeling well today from FMF and FMS standpoint. Mild recurrence of eczematous rashes.   4/15/24 -- Continues to do much better on increased colchicine dosing, no SE, no flares since last visit, no infectious sx. FMS pain slightly more active but manageable with conservative measures.   8/12/24 -- Pt flared with had to cut down to 2 tabs colchicine 2/2 insurance not covering it, reports insurance just approved colchicine 4 tabs daily so will go to . FMS pain stable, PRN gabapentin partially helping but makes her sedated if takes in AM. Mild dry mouth, papular painless rashes scattered around.

## 2024-08-12 NOTE — PHYSICAL EXAM
[General Appearance - Alert] : alert [General Appearance - In No Acute Distress] : in no acute distress [General Appearance - Well Nourished] : well nourished [General Appearance - Well Developed] : well developed [Sclera] : the sclera and conjunctiva were normal [PERRL With Normal Accommodation] : pupils were equal in size, round, and reactive to light [Extraocular Movements] : extraocular movements were intact [Outer Ear] : the ears and nose were normal in appearance [Nasal Cavity] : the nasal mucosa and septum were normal [Oropharynx] : the oropharynx was normal [Neck Appearance] : the appearance of the neck was normal [Neck Cervical Mass (___cm)] : no neck mass was observed [Respiration, Rhythm And Depth] : normal respiratory rhythm and effort [Auscultation Breath Sounds / Voice Sounds] : lungs were clear to auscultation bilaterally [Heart Rate And Rhythm] : heart rate was normal and rhythm regular [Heart Sounds] : normal S1 and S2 [Murmurs] : no murmurs [Edema] : there was no peripheral edema [Bowel Sounds] : normal bowel sounds [Abdomen Soft] : soft [Abdomen Tenderness] : non-tender [Abdomen Mass (___ Cm)] : no abdominal mass palpated [No CVA Tenderness] : no ~M costovertebral angle tenderness [No Spinal Tenderness] : no spinal tenderness [Abnormal Walk] : normal gait [Nail Clubbing] : no clubbing  or cyanosis of the fingernails [Musculoskeletal - Swelling] : no joint swelling seen [Motor Tone] : muscle strength and tone were normal [Skin Color & Pigmentation] : normal skin color and pigmentation [] : no rash [Motor Exam] : the motor exam was normal [Oriented To Time, Place, And Person] : oriented to person, place, and time [Impaired Insight] : insight and judgment were intact [Affect] : the affect was normal [Mood] : the mood was normal [FreeTextEntry1] : Decreased sensation to light touch over B/L feet

## 2024-08-27 ENCOUNTER — NON-APPOINTMENT (OUTPATIENT)
Age: 64
End: 2024-08-27

## 2024-08-28 ENCOUNTER — NON-APPOINTMENT (OUTPATIENT)
Age: 64
End: 2024-08-28

## 2024-08-28 ENCOUNTER — APPOINTMENT (OUTPATIENT)
Dept: FAMILY MEDICINE | Facility: CLINIC | Age: 64
End: 2024-08-28

## 2024-08-30 ENCOUNTER — APPOINTMENT (OUTPATIENT)
Dept: INTERNAL MEDICINE | Facility: CLINIC | Age: 64
End: 2024-08-30

## 2024-08-30 ENCOUNTER — APPOINTMENT (OUTPATIENT)
Dept: INTERNAL MEDICINE | Facility: CLINIC | Age: 64
End: 2024-08-30
Payer: COMMERCIAL

## 2024-08-30 DIAGNOSIS — M04.1 PERIODIC FEVER SYNDROMES: ICD-10-CM

## 2024-08-30 DIAGNOSIS — Z20.818 CONTACT WITH AND (SUSPECTED) EXPOSURE TO OTHER BACTERIAL COMMUNICABLE DISEASES: ICD-10-CM

## 2024-08-30 PROCEDURE — 99214 OFFICE O/P EST MOD 30 MIN: CPT

## 2024-08-30 PROCEDURE — G2211 COMPLEX E/M VISIT ADD ON: CPT

## 2024-08-30 NOTE — HISTORY OF PRESENT ILLNESS
[FreeTextEntry8] : Ms. ANDREW STEPHENS is a 64 year F who comes in for an acute visit. 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 She cooks, cleans as aide, does not do personal hygiene. Patient denies any diarrhea, rashes, eye redness/discharge, ear pain/discharge and is worried about MRSA exposure.  Patient does have upcoming appoint with endocrinology and is due to have blood work and thyroid ultrasound. Patient also recently seen by rheumatology and notes and labs reviewed including CBC and CMP. Patient states she has noticed abnormal color urine recently and would like to have repeat urine testing. Patient notes recently having possible bulge sensation in vagina. Patient denies any cp, sob, abdominal pain, nausea, vomiting, palpitations, fever, chills, constipation.

## 2024-08-30 NOTE — ASSESSMENT
[FreeTextEntry1] : 1.FMF: Continue follow-up with rheumatology, continue on colchicine 0.6 mg 2 tablets twice daily.  Continue following up with rheumatology for yearly CBC CMP.  Recheck vitamin B12 and urine protein creatinine ratio.  2.hypothyroidism with history of thyroid CA: Will have blood work done and thyroid ultrasound and follow-up with endocrinology, continue on levothyroxine 112 mcg.  overdue to follow-up with head and neck surgeon in [place].  3.MRSA exposure: No symptoms at this time, discussed getting MRSA nasal swab.  4.possible vaginal prolapse: Advise referral to GYN at this time, given information for GYN Dr. Maya.  5.abnormal urine color: Patient request for repeat urine testing.  No symptoms of dysuria frequency or urgency.

## 2024-09-03 ENCOUNTER — OUTPATIENT (OUTPATIENT)
Dept: OUTPATIENT SERVICES | Facility: HOSPITAL | Age: 64
LOS: 1 days | End: 2024-09-03
Payer: MEDICAID

## 2024-09-03 ENCOUNTER — APPOINTMENT (OUTPATIENT)
Dept: ULTRASOUND IMAGING | Facility: CLINIC | Age: 64
End: 2024-09-03

## 2024-09-03 DIAGNOSIS — Z00.8 ENCOUNTER FOR OTHER GENERAL EXAMINATION: ICD-10-CM

## 2024-09-03 DIAGNOSIS — E03.9 HYPOTHYROIDISM, UNSPECIFIED: ICD-10-CM

## 2024-09-03 PROCEDURE — 76536 US EXAM OF HEAD AND NECK: CPT

## 2024-09-03 PROCEDURE — 76536 US EXAM OF HEAD AND NECK: CPT | Mod: 26

## 2024-09-04 ENCOUNTER — LABORATORY RESULT (OUTPATIENT)
Age: 64
End: 2024-09-04

## 2024-09-06 LAB
APPEARANCE: CLEAR
BILIRUBIN URINE: NEGATIVE
BLOOD URINE: NEGATIVE
COLOR: YELLOW
CREAT SPEC-SCNC: 48 MG/DL
CREAT/PROT UR: 0.1 RATIO
GLUCOSE QUALITATIVE U: NEGATIVE MG/DL
INFECTION CONTROL CULTURE MRSA/MSSA: NORMAL
KETONES URINE: NEGATIVE MG/DL
LEUKOCYTE ESTERASE URINE: ABNORMAL
NITRITE URINE: NEGATIVE
PH URINE: 7
PROT UR-MCNC: 4 MG/DL
PROTEIN URINE: NEGATIVE MG/DL
SPECIFIC GRAVITY URINE: 1.01
UROBILINOGEN URINE: 0.2 MG/DL
VIT B12 SERPL-MCNC: 802 PG/ML

## 2024-09-09 ENCOUNTER — APPOINTMENT (OUTPATIENT)
Dept: ENDOCRINOLOGY | Facility: CLINIC | Age: 64
End: 2024-09-09
Payer: MEDICAID

## 2024-09-09 ENCOUNTER — NON-APPOINTMENT (OUTPATIENT)
Age: 64
End: 2024-09-09

## 2024-09-09 VITALS
SYSTOLIC BLOOD PRESSURE: 122 MMHG | BODY MASS INDEX: 23.86 KG/M2 | HEART RATE: 90 BPM | OXYGEN SATURATION: 96 % | DIASTOLIC BLOOD PRESSURE: 72 MMHG | HEIGHT: 67 IN | WEIGHT: 152 LBS

## 2024-09-09 DIAGNOSIS — E03.9 HYPOTHYROIDISM, UNSPECIFIED: ICD-10-CM

## 2024-09-09 DIAGNOSIS — C73 MALIGNANT NEOPLASM OF THYROID GLAND: ICD-10-CM

## 2024-09-09 PROCEDURE — 99214 OFFICE O/P EST MOD 30 MIN: CPT
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
## 2024-09-09 NOTE — ASSESSMENT
[FreeTextEntry1] : Sarita is a 64  yr old female here for follow up of hypothyroidism , h/o thyroid PTC ,hypothyroidism,was seen by Dr. Duncan in past.  She has h/o a hx of Thyroid Ca- TT in 2011, left lobe multifocal 0.7 -1.5 cm, no capsule invasion,  right 0.5 cm, positive LN, back ground Hashimotos.  s/p CHAVEZ 125 mCi IN 2011.  S/P LN dissection 8/8/14 for increased LN , positive cytology  s/p CHAVEZ 120 mci 7/2015 -wbs post Tx negative.  TG negative    On Levothyroxine 112 mcg qd.  Neck US in 12/21. Then in 6/23, stable.  Since she had recurrence in 2014, will recommend to keep TSH suppressed for 10 years below 0.1.  TSH0.12 in 9/24,  Will try to keep it close to 0.1,  for now Continue with same dose and repeat blood work next visit.  Discussed with patient how to take thyroid medication appropriately,empty stomach ,  all Multi vitamins 4 to 6 hrs away form thyroid medication, he voiced understanding.  TG and TG ab done in 5/23 undetectable. TG and TG ab undetectable in 2/24 and in 9/24. will repeat next visit. To continue MVI , calcium and vitamin D daily Patient got  neck US yesterday, report pending.   RTC in 6 months   .

## 2024-09-09 NOTE — PHYSICAL EXAM
[Alert] : alert [No Acute Distress] : no acute distress [PERRL] : pupils equal, round and reactive to light [No Proptosis] : no proptosis [No Neck Mass] : no neck mass was observed [No Thyroid Nodules] : no palpable thyroid nodules [Well Healed Scar] : well healed scar [No Respiratory Distress] : no respiratory distress [No Accessory Muscle Use] : no accessory muscle use [Clear to Auscultation] : lungs were clear to auscultation bilaterally [Normal S1, S2] : normal S1 and S2 [Normal Rate] : heart rate was normal [Regular Rhythm] : with a regular rhythm [Normal Anterior Cervical Nodes] : no anterior cervical lymphadenopathy [No Tremors] : no tremors [Oriented x3] : oriented to person, place, and time [Normal Affect] : the affect was normal [Normal Insight/Judgement] : insight and judgment were intact [Normal Mood] : the mood was normal

## 2024-09-10 ENCOUNTER — NON-APPOINTMENT (OUTPATIENT)
Age: 64
End: 2024-09-10

## 2024-09-10 DIAGNOSIS — N39.0 URINARY TRACT INFECTION, SITE NOT SPECIFIED: ICD-10-CM

## 2024-09-10 RX ORDER — AMOXICILLIN AND CLAVULANATE POTASSIUM 875; 125 MG/1; MG/1
875-125 TABLET, COATED ORAL
Qty: 10 | Refills: 0 | Status: ACTIVE | COMMUNITY
Start: 2024-09-10 | End: 1900-01-01

## 2024-09-25 PROBLEM — Z12.4 CERVICAL CANCER SCREENING: Status: ACTIVE | Noted: 2024-09-25

## 2024-09-25 PROBLEM — Z01.419 ENCOUNTER FOR ROUTINE GYNECOLOGICAL EXAMINATION: Status: ACTIVE | Noted: 2024-09-25

## 2024-10-01 ENCOUNTER — APPOINTMENT (OUTPATIENT)
Dept: OBGYN | Facility: CLINIC | Age: 64
End: 2024-10-01
Payer: MEDICAID

## 2024-10-01 VITALS
BODY MASS INDEX: 23.86 KG/M2 | SYSTOLIC BLOOD PRESSURE: 144 MMHG | HEART RATE: 87 BPM | HEIGHT: 67 IN | DIASTOLIC BLOOD PRESSURE: 80 MMHG | WEIGHT: 152 LBS

## 2024-10-01 DIAGNOSIS — Z00.00 ENCOUNTER FOR GENERAL ADULT MEDICAL EXAMINATION W/OUT ABNORMAL FINDINGS: ICD-10-CM

## 2024-10-01 DIAGNOSIS — Z01.419 ENCOUNTER FOR GYNECOLOGICAL EXAMINATION (GENERAL) (ROUTINE) W/OUT ABNORMAL FINDINGS: ICD-10-CM

## 2024-10-01 DIAGNOSIS — Z12.4 ENCOUNTER FOR SCREENING FOR MALIGNANT NEOPLASM OF CERVIX: ICD-10-CM

## 2024-10-01 DIAGNOSIS — Z91.89 OTHER SPECIFIED PERSONAL RISK FACTORS, NOT ELSEWHERE CLASSIFIED: ICD-10-CM

## 2024-10-01 DIAGNOSIS — Z12.39 ENCOUNTER FOR OTHER SCREENING FOR MALIGNANT NEOPLASM OF BREAST: ICD-10-CM

## 2024-10-01 LAB
BILIRUB UR QL STRIP: NEGATIVE
CLARITY UR: CLEAR
COLLECTION METHOD: NORMAL
GLUCOSE UR-MCNC: NEGATIVE
HCG UR QL: 0 EU/DL
HEMOGLOBIN: 12
HGB UR QL STRIP.AUTO: NORMAL
KETONES UR-MCNC: NEGATIVE
LEUKOCYTE ESTERASE UR QL STRIP: NEGATIVE
NITRITE UR QL STRIP: NEGATIVE
PH UR STRIP: 5
PROT UR STRIP-MCNC: NEGATIVE
SP GR UR STRIP: 1

## 2024-10-01 PROCEDURE — 85018 HEMOGLOBIN: CPT | Mod: QW

## 2024-10-01 PROCEDURE — 81003 URINALYSIS AUTO W/O SCOPE: CPT | Mod: QW

## 2024-10-01 PROCEDURE — 99459 PELVIC EXAMINATION: CPT

## 2024-10-01 PROCEDURE — 82270 OCCULT BLOOD FECES: CPT

## 2024-10-01 PROCEDURE — 99213 OFFICE O/P EST LOW 20 MIN: CPT | Mod: 25

## 2024-10-01 PROCEDURE — 99396 PREV VISIT EST AGE 40-64: CPT

## 2024-10-01 RX ORDER — MIRABEGRON 25 MG/1
25 TABLET, EXTENDED RELEASE ORAL DAILY
Qty: 60 | Refills: 1 | Status: ACTIVE | COMMUNITY
Start: 2024-10-01 | End: 1900-01-01

## 2024-10-01 RX ORDER — NYSTATIN AND TRIAMCINOLONE ACETONIDE 100000; 1 MG/G; MG/G
100000-0.1 CREAM TOPICAL 3 TIMES DAILY
Qty: 1 | Refills: 0 | Status: ACTIVE | COMMUNITY
Start: 2024-10-01 | End: 1900-01-01

## 2024-10-01 NOTE — PHYSICAL EXAM
[Chaperone Present] : A chaperone was present in the examining room during all aspects of the physical examination [Appropriately responsive] : appropriately responsive [Alert] : alert [No Acute Distress] : no acute distress [No Lymphadenopathy] : no lymphadenopathy [Soft] : soft [Non-tender] : non-tender [Non-distended] : non-distended [No HSM] : No HSM [No Lesions] : no lesions [No Mass] : no mass [Oriented x3] : oriented x3 [Examination Of The Breasts] : a normal appearance [No Masses] : no breast masses were palpable [Labia Majora] : normal [Labia Minora] : normal [Normal] : normal [Uterine Adnexae] : normal [Normal rectal exam] : was normal [FreeTextEntry2] : Np student violeta [Occult Blood Positive] : was negative for occult blood analysis

## 2024-10-01 NOTE — HISTORY OF PRESENT ILLNESS
[TextBox_4] : 64 year old female presents as a new patient here to establish care. c/o urge incontinence. she wears pads daily. c/o perineum irritation s/s pad use. she has not had a pap in many years. her last cpy was many years ago, family hx of colon ca (uncles age 52 & age 47) PMH: 2 vaginal deliveries, multiple miscarriages, thyroidectomy s/s thyroid ca. she has hx of hematuria was previously seeing urologist.

## 2024-10-01 NOTE — PLAN
[FreeTextEntry1] : Patient to follow up in 1 year for annual GYN exam mybetriq 25 ordered for urge incontinence. discussed side effects. if she feels her symptoms are not improving will plan formal UDS.  mycolog cream prescribed s/s perineum irritation.  Mammogram due: now, rx given Colonoscopy due: referred to GI, stressed importance of going s/s family hx  Bone density due: now, rx given Pap ordered Hemoccult ordered All questions answered, patient is agreeable with plan. PMB precautions reviewed vaginal lubricants PRN

## 2024-10-08 ENCOUNTER — NON-APPOINTMENT (OUTPATIENT)
Age: 64
End: 2024-10-08

## 2024-10-08 ENCOUNTER — APPOINTMENT (OUTPATIENT)
Dept: INTERNAL MEDICINE | Facility: CLINIC | Age: 64
End: 2024-10-08
Payer: MEDICAID

## 2024-10-08 VITALS
WEIGHT: 156 LBS | TEMPERATURE: 97.9 F | HEIGHT: 67 IN | BODY MASS INDEX: 24.48 KG/M2 | DIASTOLIC BLOOD PRESSURE: 72 MMHG | OXYGEN SATURATION: 97 % | HEART RATE: 86 BPM | SYSTOLIC BLOOD PRESSURE: 118 MMHG

## 2024-10-08 DIAGNOSIS — F32.A DEPRESSION, UNSPECIFIED: ICD-10-CM

## 2024-10-08 DIAGNOSIS — M04.1 PERIODIC FEVER SYNDROMES: ICD-10-CM

## 2024-10-08 DIAGNOSIS — E78.5 HYPERLIPIDEMIA, UNSPECIFIED: ICD-10-CM

## 2024-10-08 DIAGNOSIS — B18.1 CHRONIC VIRAL HEPATITIS B W/OUT DELTA-AGENT: ICD-10-CM

## 2024-10-08 DIAGNOSIS — Z80.0 FAMILY HISTORY OF MALIGNANT NEOPLASM OF DIGESTIVE ORGANS: ICD-10-CM

## 2024-10-08 DIAGNOSIS — R73.03 PREDIABETES.: ICD-10-CM

## 2024-10-08 LAB — CYTOLOGY CVX/VAG DOC THIN PREP: ABNORMAL

## 2024-10-08 PROCEDURE — G2211 COMPLEX E/M VISIT ADD ON: CPT | Mod: NC

## 2024-10-08 PROCEDURE — 99214 OFFICE O/P EST MOD 30 MIN: CPT

## 2024-10-09 LAB
CHOLEST SERPL-MCNC: 219 MG/DL
ESTIMATED AVERAGE GLUCOSE: 123 MG/DL
HBA1C MFR BLD HPLC: 5.9 %
HDLC SERPL-MCNC: 80 MG/DL
LDLC SERPL CALC-MCNC: 125 MG/DL
NONHDLC SERPL-MCNC: 139 MG/DL
TRIGL SERPL-MCNC: 79 MG/DL

## 2024-10-29 ENCOUNTER — RX RENEWAL (OUTPATIENT)
Age: 64
End: 2024-10-29

## 2024-10-29 RX ORDER — CALCIUM CARBONATE-VITAMIN D TAB 500 MG-200 UNIT 500-200 MG-UNIT
500-5 TAB ORAL
Qty: 180 | Refills: 3 | Status: ACTIVE | COMMUNITY
Start: 2024-10-29 | End: 1900-01-01

## 2024-11-14 ENCOUNTER — OUTPATIENT (OUTPATIENT)
Dept: OUTPATIENT SERVICES | Facility: HOSPITAL | Age: 64
LOS: 1 days | End: 2024-11-14
Payer: MEDICAID

## 2024-11-14 ENCOUNTER — RESULT REVIEW (OUTPATIENT)
Age: 64
End: 2024-11-14

## 2024-11-14 ENCOUNTER — APPOINTMENT (OUTPATIENT)
Dept: RADIOLOGY | Facility: CLINIC | Age: 64
End: 2024-11-14
Payer: MEDICAID

## 2024-11-14 ENCOUNTER — APPOINTMENT (OUTPATIENT)
Dept: MAMMOGRAPHY | Facility: CLINIC | Age: 64
End: 2024-11-14
Payer: MEDICAID

## 2024-11-14 DIAGNOSIS — Z00.8 ENCOUNTER FOR OTHER GENERAL EXAMINATION: ICD-10-CM

## 2024-11-14 DIAGNOSIS — Z12.39 ENCOUNTER FOR OTHER SCREENING FOR MALIGNANT NEOPLASM OF BREAST: ICD-10-CM

## 2024-11-14 PROCEDURE — 77085 DXA BONE DENSITY AXL VRT FX: CPT | Mod: 26

## 2024-11-14 PROCEDURE — 77063 BREAST TOMOSYNTHESIS BI: CPT | Mod: 26

## 2024-11-14 PROCEDURE — 77067 SCR MAMMO BI INCL CAD: CPT

## 2024-11-14 PROCEDURE — 77085 DXA BONE DENSITY AXL VRT FX: CPT

## 2024-11-14 PROCEDURE — 77067 SCR MAMMO BI INCL CAD: CPT | Mod: 26

## 2024-11-14 PROCEDURE — 77063 BREAST TOMOSYNTHESIS BI: CPT

## 2024-11-15 ENCOUNTER — NON-APPOINTMENT (OUTPATIENT)
Age: 64
End: 2024-11-15

## 2024-11-18 ENCOUNTER — NON-APPOINTMENT (OUTPATIENT)
Age: 64
End: 2024-11-18

## 2024-12-02 ENCOUNTER — APPOINTMENT (OUTPATIENT)
Dept: RHEUMATOLOGY | Facility: CLINIC | Age: 64
End: 2024-12-02

## 2024-12-02 VITALS
HEART RATE: 78 BPM | OXYGEN SATURATION: 98 % | DIASTOLIC BLOOD PRESSURE: 72 MMHG | WEIGHT: 158 LBS | HEIGHT: 67 IN | TEMPERATURE: 97.6 F | BODY MASS INDEX: 24.8 KG/M2 | SYSTOLIC BLOOD PRESSURE: 118 MMHG

## 2024-12-02 DIAGNOSIS — M79.7 FIBROMYALGIA: ICD-10-CM

## 2024-12-02 DIAGNOSIS — R21 RASH AND OTHER NONSPECIFIC SKIN ERUPTION: ICD-10-CM

## 2024-12-02 DIAGNOSIS — M67.479 GANGLION, UNSPECIFIED ANKLE AND FOOT: ICD-10-CM

## 2024-12-02 DIAGNOSIS — M04.1 PERIODIC FEVER SYNDROMES: ICD-10-CM

## 2024-12-02 PROCEDURE — G2211 COMPLEX E/M VISIT ADD ON: CPT | Mod: NC

## 2024-12-02 PROCEDURE — 99214 OFFICE O/P EST MOD 30 MIN: CPT

## 2024-12-02 RX ORDER — TRIAMCINOLONE ACETONIDE 1 MG/G
0.1 CREAM TOPICAL
Qty: 1 | Refills: 1 | Status: ACTIVE | COMMUNITY
Start: 2024-12-02 | End: 1900-01-01

## 2024-12-13 ENCOUNTER — RX RENEWAL (OUTPATIENT)
Age: 64
End: 2024-12-13

## 2024-12-13 LAB
ALBUMIN SERPL ELPH-MCNC: 4.4 G/DL
ALP BLD-CCNC: 57 U/L
ALT SERPL-CCNC: 16 U/L
ANA PAT FLD IF-IMP: ABNORMAL
ANA SER IF-ACNC: ABNORMAL
ANION GAP SERPL CALC-SCNC: 11 MMOL/L
AST SERPL-CCNC: 21 U/L
BASOPHILS # BLD AUTO: 0.03 K/UL
BASOPHILS NFR BLD AUTO: 0.5 %
BILIRUB SERPL-MCNC: 0.3 MG/DL
BUN SERPL-MCNC: 24 MG/DL
C3 SERPL-MCNC: 139 MG/DL
C4 SERPL-MCNC: 34 MG/DL
CALCIUM SERPL-MCNC: 9.7 MG/DL
CHLORIDE SERPL-SCNC: 101 MMOL/L
CO2 SERPL-SCNC: 26 MMOL/L
CREAT SERPL-MCNC: 0.78 MG/DL
CRP SERPL-MCNC: <3 MG/L
EGFR: 86 ML/MIN/1.73M2
ENA SS-A AB SER IA-ACNC: <0.2 AL
ENA SS-B AB SER IA-ACNC: <0.2 AL
EOSINOPHIL # BLD AUTO: 0.08 K/UL
EOSINOPHIL NFR BLD AUTO: 1.3 %
ERYTHROCYTE [SEDIMENTATION RATE] IN BLOOD BY WESTERGREN METHOD: 31 MM/HR
GLUCOSE SERPL-MCNC: 104 MG/DL
HCT VFR BLD CALC: 39.6 %
HGB BLD-MCNC: 12.8 G/DL
IMM GRANULOCYTES NFR BLD AUTO: 0.3 %
LYMPHOCYTES # BLD AUTO: 2.18 K/UL
LYMPHOCYTES NFR BLD AUTO: 36.3 %
MAN DIFF?: NORMAL
MCHC RBC-ENTMCNC: 28.7 PG
MCHC RBC-ENTMCNC: 32.3 G/DL
MCV RBC AUTO: 88.8 FL
MONOCYTES # BLD AUTO: 0.67 K/UL
MONOCYTES NFR BLD AUTO: 11.1 %
NEUTROPHILS # BLD AUTO: 3.03 K/UL
NEUTROPHILS NFR BLD AUTO: 50.5 %
PLATELET # BLD AUTO: 227 K/UL
POTASSIUM SERPL-SCNC: 4.5 MMOL/L
PROT SERPL-MCNC: 7.4 G/DL
RBC # BLD: 4.46 M/UL
RBC # FLD: 13 %
SODIUM SERPL-SCNC: 138 MMOL/L
WBC # FLD AUTO: 6.01 K/UL

## 2024-12-30 ENCOUNTER — RESULT REVIEW (OUTPATIENT)
Age: 64
End: 2024-12-30

## 2025-01-02 ENCOUNTER — OUTPATIENT (OUTPATIENT)
Dept: OUTPATIENT SERVICES | Facility: HOSPITAL | Age: 65
LOS: 1 days | End: 2025-01-02
Payer: MEDICAID

## 2025-01-02 ENCOUNTER — APPOINTMENT (OUTPATIENT)
Dept: ULTRASOUND IMAGING | Facility: CLINIC | Age: 65
End: 2025-01-02
Payer: MEDICAID

## 2025-01-02 DIAGNOSIS — M67.479 GANGLION, UNSPECIFIED ANKLE AND FOOT: ICD-10-CM

## 2025-01-02 DIAGNOSIS — Z00.8 ENCOUNTER FOR OTHER GENERAL EXAMINATION: ICD-10-CM

## 2025-01-02 PROCEDURE — 76882 US LMTD JT/FCL EVL NVASC XTR: CPT

## 2025-01-02 PROCEDURE — 76882 US LMTD JT/FCL EVL NVASC XTR: CPT | Mod: 26,LT

## 2025-02-25 DIAGNOSIS — C73 MALIGNANT NEOPLASM OF THYROID GLAND: ICD-10-CM

## 2025-02-25 DIAGNOSIS — E03.9 HYPOTHYROIDISM, UNSPECIFIED: ICD-10-CM

## 2025-02-26 ENCOUNTER — NON-APPOINTMENT (OUTPATIENT)
Age: 65
End: 2025-02-26

## 2025-02-26 ENCOUNTER — APPOINTMENT (OUTPATIENT)
Dept: DERMATOLOGY | Facility: CLINIC | Age: 65
End: 2025-02-26
Payer: MEDICAID

## 2025-02-26 DIAGNOSIS — L30.9 DERMATITIS, UNSPECIFIED: ICD-10-CM

## 2025-02-26 PROCEDURE — 99204 OFFICE O/P NEW MOD 45 MIN: CPT

## 2025-02-26 RX ORDER — MOMETASONE FUROATE 1 MG/G
0.1 OINTMENT TOPICAL TWICE DAILY
Qty: 1 | Refills: 3 | Status: ACTIVE | COMMUNITY
Start: 2025-02-26 | End: 1900-01-01

## 2025-03-11 ENCOUNTER — RX RENEWAL (OUTPATIENT)
Age: 65
End: 2025-03-11

## 2025-03-22 ENCOUNTER — LABORATORY RESULT (OUTPATIENT)
Age: 65
End: 2025-03-22

## 2025-03-25 ENCOUNTER — APPOINTMENT (OUTPATIENT)
Dept: ENDOCRINOLOGY | Facility: CLINIC | Age: 65
End: 2025-03-25
Payer: MEDICAID

## 2025-03-25 VITALS
OXYGEN SATURATION: 94 % | HEIGHT: 67 IN | DIASTOLIC BLOOD PRESSURE: 74 MMHG | SYSTOLIC BLOOD PRESSURE: 118 MMHG | HEART RATE: 89 BPM | WEIGHT: 156 LBS | BODY MASS INDEX: 24.48 KG/M2

## 2025-03-25 DIAGNOSIS — C73 MALIGNANT NEOPLASM OF THYROID GLAND: ICD-10-CM

## 2025-03-25 DIAGNOSIS — E03.9 HYPOTHYROIDISM, UNSPECIFIED: ICD-10-CM

## 2025-03-25 DIAGNOSIS — R73.03 PREDIABETES.: ICD-10-CM

## 2025-03-25 PROCEDURE — 99214 OFFICE O/P EST MOD 30 MIN: CPT

## 2025-03-31 ENCOUNTER — APPOINTMENT (OUTPATIENT)
Dept: RHEUMATOLOGY | Facility: CLINIC | Age: 65
End: 2025-03-31
Payer: MEDICAID

## 2025-03-31 VITALS
SYSTOLIC BLOOD PRESSURE: 130 MMHG | OXYGEN SATURATION: 95 % | DIASTOLIC BLOOD PRESSURE: 80 MMHG | HEART RATE: 91 BPM | BODY MASS INDEX: 24.33 KG/M2 | HEIGHT: 67 IN | WEIGHT: 155 LBS

## 2025-03-31 DIAGNOSIS — M79.7 FIBROMYALGIA: ICD-10-CM

## 2025-03-31 DIAGNOSIS — M79.671 PAIN IN RIGHT FOOT: ICD-10-CM

## 2025-03-31 DIAGNOSIS — G89.29 PAIN IN RIGHT KNEE: ICD-10-CM

## 2025-03-31 DIAGNOSIS — M25.561 PAIN IN RIGHT KNEE: ICD-10-CM

## 2025-03-31 DIAGNOSIS — M25.562 PAIN IN RIGHT KNEE: ICD-10-CM

## 2025-03-31 DIAGNOSIS — M25.551 PAIN IN RIGHT HIP: ICD-10-CM

## 2025-03-31 DIAGNOSIS — M79.672 PAIN IN RIGHT FOOT: ICD-10-CM

## 2025-03-31 PROCEDURE — G2211 COMPLEX E/M VISIT ADD ON: CPT | Mod: NC

## 2025-03-31 PROCEDURE — 99214 OFFICE O/P EST MOD 30 MIN: CPT

## 2025-04-07 ENCOUNTER — LABORATORY RESULT (OUTPATIENT)
Age: 65
End: 2025-04-07

## 2025-04-07 LAB
CRP SERPL-MCNC: 4 MG/L
ERYTHROCYTE [SEDIMENTATION RATE] IN BLOOD BY WESTERGREN METHOD: 18 MM/HR

## 2025-04-10 ENCOUNTER — NON-APPOINTMENT (OUTPATIENT)
Age: 65
End: 2025-04-10

## 2025-04-10 ENCOUNTER — APPOINTMENT (OUTPATIENT)
Dept: INTERNAL MEDICINE | Facility: CLINIC | Age: 65
End: 2025-04-10
Payer: MEDICAID

## 2025-04-10 VITALS
BODY MASS INDEX: 23.86 KG/M2 | HEIGHT: 67 IN | OXYGEN SATURATION: 95 % | DIASTOLIC BLOOD PRESSURE: 80 MMHG | HEART RATE: 86 BPM | WEIGHT: 152 LBS | TEMPERATURE: 97.7 F | SYSTOLIC BLOOD PRESSURE: 118 MMHG

## 2025-04-10 DIAGNOSIS — R73.03 PREDIABETES.: ICD-10-CM

## 2025-04-10 DIAGNOSIS — C73 MALIGNANT NEOPLASM OF THYROID GLAND: ICD-10-CM

## 2025-04-10 DIAGNOSIS — B18.1 CHRONIC VIRAL HEPATITIS B W/OUT DELTA-AGENT: ICD-10-CM

## 2025-04-10 DIAGNOSIS — E78.5 HYPERLIPIDEMIA, UNSPECIFIED: ICD-10-CM

## 2025-04-10 DIAGNOSIS — Z00.00 ENCOUNTER FOR GENERAL ADULT MEDICAL EXAMINATION W/OUT ABNORMAL FINDINGS: ICD-10-CM

## 2025-04-10 DIAGNOSIS — M04.1 PERIODIC FEVER SYNDROMES: ICD-10-CM

## 2025-04-10 DIAGNOSIS — E03.9 HYPOTHYROIDISM, UNSPECIFIED: ICD-10-CM

## 2025-04-10 DIAGNOSIS — F32.A DEPRESSION, UNSPECIFIED: ICD-10-CM

## 2025-04-10 PROCEDURE — 99396 PREV VISIT EST AGE 40-64: CPT

## 2025-04-10 PROCEDURE — 93000 ELECTROCARDIOGRAM COMPLETE: CPT

## 2025-06-25 ENCOUNTER — APPOINTMENT (OUTPATIENT)
Dept: NEUROLOGY | Facility: CLINIC | Age: 65
End: 2025-06-25
Payer: MEDICAID

## 2025-06-25 VITALS
BODY MASS INDEX: 24.33 KG/M2 | WEIGHT: 155 LBS | HEART RATE: 80 BPM | HEIGHT: 67 IN | RESPIRATION RATE: 15 BRPM | DIASTOLIC BLOOD PRESSURE: 80 MMHG | SYSTOLIC BLOOD PRESSURE: 128 MMHG

## 2025-06-25 PROCEDURE — 99214 OFFICE O/P EST MOD 30 MIN: CPT

## 2025-07-07 ENCOUNTER — APPOINTMENT (OUTPATIENT)
Dept: RHEUMATOLOGY | Facility: CLINIC | Age: 65
End: 2025-07-07

## 2025-07-07 ENCOUNTER — RX RENEWAL (OUTPATIENT)
Age: 65
End: 2025-07-07

## 2025-07-07 VITALS
DIASTOLIC BLOOD PRESSURE: 68 MMHG | HEIGHT: 67 IN | HEART RATE: 68 BPM | WEIGHT: 155 LBS | BODY MASS INDEX: 24.33 KG/M2 | OXYGEN SATURATION: 97 % | SYSTOLIC BLOOD PRESSURE: 100 MMHG

## 2025-07-07 PROBLEM — M54.41 CHRONIC RIGHT-SIDED LOW BACK PAIN WITH RIGHT-SIDED SCIATICA: Status: ACTIVE | Noted: 2025-06-25

## 2025-07-07 PROCEDURE — G2211 COMPLEX E/M VISIT ADD ON: CPT | Mod: NC

## 2025-07-07 PROCEDURE — 99214 OFFICE O/P EST MOD 30 MIN: CPT

## 2025-07-28 DIAGNOSIS — M79.661 PAIN IN RIGHT LOWER LEG: ICD-10-CM

## 2025-07-30 ENCOUNTER — APPOINTMENT (OUTPATIENT)
Dept: ULTRASOUND IMAGING | Facility: CLINIC | Age: 65
End: 2025-07-30
Payer: MEDICAID

## 2025-07-30 ENCOUNTER — OUTPATIENT (OUTPATIENT)
Dept: OUTPATIENT SERVICES | Facility: HOSPITAL | Age: 65
LOS: 1 days | End: 2025-07-30
Payer: MEDICAID

## 2025-07-30 DIAGNOSIS — M79.661 PAIN IN RIGHT LOWER LEG: ICD-10-CM

## 2025-07-30 PROCEDURE — 93971 EXTREMITY STUDY: CPT | Mod: 26,RT

## 2025-07-30 PROCEDURE — 93971 EXTREMITY STUDY: CPT

## 2025-07-31 ENCOUNTER — APPOINTMENT (OUTPATIENT)
Dept: MRI IMAGING | Facility: CLINIC | Age: 65
End: 2025-07-31
Payer: MEDICAID

## 2025-07-31 ENCOUNTER — OUTPATIENT (OUTPATIENT)
Dept: OUTPATIENT SERVICES | Facility: HOSPITAL | Age: 65
LOS: 1 days | End: 2025-07-31
Payer: MEDICAID

## 2025-07-31 ENCOUNTER — APPOINTMENT (OUTPATIENT)
Dept: RADIOLOGY | Facility: CLINIC | Age: 65
End: 2025-07-31
Payer: MEDICAID

## 2025-07-31 DIAGNOSIS — Z00.8 ENCOUNTER FOR OTHER GENERAL EXAMINATION: ICD-10-CM

## 2025-07-31 DIAGNOSIS — M54.41 LUMBAGO WITH SCIATICA, RIGHT SIDE: ICD-10-CM

## 2025-07-31 PROCEDURE — 73562 X-RAY EXAM OF KNEE 3: CPT | Mod: 26,50

## 2025-07-31 PROCEDURE — 73502 X-RAY EXAM HIP UNI 2-3 VIEWS: CPT | Mod: 26,RT

## 2025-07-31 PROCEDURE — 72148 MRI LUMBAR SPINE W/O DYE: CPT | Mod: 26

## 2025-07-31 PROCEDURE — 73502 X-RAY EXAM HIP UNI 2-3 VIEWS: CPT

## 2025-07-31 PROCEDURE — 72148 MRI LUMBAR SPINE W/O DYE: CPT

## 2025-07-31 PROCEDURE — 73562 X-RAY EXAM OF KNEE 3: CPT

## 2025-08-04 ENCOUNTER — LABORATORY RESULT (OUTPATIENT)
Age: 65
End: 2025-08-04

## 2025-08-04 ENCOUNTER — APPOINTMENT (OUTPATIENT)
Dept: VASCULAR SURGERY | Facility: CLINIC | Age: 65
End: 2025-08-04
Payer: COMMERCIAL

## 2025-08-04 ENCOUNTER — APPOINTMENT (OUTPATIENT)
Dept: RHEUMATOLOGY | Facility: CLINIC | Age: 65
End: 2025-08-04
Payer: MEDICAID

## 2025-08-04 ENCOUNTER — APPOINTMENT (OUTPATIENT)
Dept: INTERNAL MEDICINE | Facility: CLINIC | Age: 65
End: 2025-08-04
Payer: COMMERCIAL

## 2025-08-04 VITALS
BODY MASS INDEX: 23.86 KG/M2 | TEMPERATURE: 98.3 F | HEIGHT: 67 IN | OXYGEN SATURATION: 98 % | HEART RATE: 95 BPM | DIASTOLIC BLOOD PRESSURE: 72 MMHG | WEIGHT: 152 LBS | SYSTOLIC BLOOD PRESSURE: 128 MMHG

## 2025-08-04 VITALS
HEIGHT: 67 IN | DIASTOLIC BLOOD PRESSURE: 79 MMHG | TEMPERATURE: 97.7 F | HEART RATE: 86 BPM | OXYGEN SATURATION: 98 % | BODY MASS INDEX: 23.86 KG/M2 | SYSTOLIC BLOOD PRESSURE: 129 MMHG | WEIGHT: 152 LBS

## 2025-08-04 DIAGNOSIS — R60.0 LOCALIZED EDEMA: ICD-10-CM

## 2025-08-04 DIAGNOSIS — M04.1 PERIODIC FEVER SYNDROMES: ICD-10-CM

## 2025-08-04 DIAGNOSIS — L03.90 CELLULITIS, UNSPECIFIED: ICD-10-CM

## 2025-08-04 PROCEDURE — 99214 OFFICE O/P EST MOD 30 MIN: CPT

## 2025-08-04 PROCEDURE — 99212 OFFICE O/P EST SF 10 MIN: CPT

## 2025-08-04 PROCEDURE — G2211 COMPLEX E/M VISIT ADD ON: CPT | Mod: NC

## 2025-08-06 ENCOUNTER — NON-APPOINTMENT (OUTPATIENT)
Age: 65
End: 2025-08-06

## 2025-08-07 ENCOUNTER — APPOINTMENT (OUTPATIENT)
Dept: CARDIOLOGY | Facility: CLINIC | Age: 65
End: 2025-08-07
Payer: COMMERCIAL

## 2025-08-07 VITALS
HEART RATE: 87 BPM | SYSTOLIC BLOOD PRESSURE: 120 MMHG | OXYGEN SATURATION: 97 % | WEIGHT: 153 LBS | BODY MASS INDEX: 24.01 KG/M2 | DIASTOLIC BLOOD PRESSURE: 78 MMHG | HEIGHT: 67 IN

## 2025-08-07 DIAGNOSIS — L03.115 CELLULITIS OF RIGHT LOWER LIMB: ICD-10-CM

## 2025-08-07 DIAGNOSIS — E78.5 HYPERLIPIDEMIA, UNSPECIFIED: ICD-10-CM

## 2025-08-07 LAB
ALBUMIN SERPL ELPH-MCNC: 4.3 G/DL
ALP BLD-CCNC: 68 U/L
ALT SERPL-CCNC: 20 U/L
ANION GAP SERPL CALC-SCNC: 13 MMOL/L
AST SERPL-CCNC: 31 U/L
BASOPHILS # BLD AUTO: 0.04 K/UL
BASOPHILS NFR BLD AUTO: 0.6 %
BILIRUB SERPL-MCNC: 0.3 MG/DL
BUN SERPL-MCNC: 18 MG/DL
CALCIUM SERPL-MCNC: 9.6 MG/DL
CHLORIDE SERPL-SCNC: 103 MMOL/L
CO2 SERPL-SCNC: 24 MMOL/L
CREAT SERPL-MCNC: 0.76 MG/DL
EGFRCR SERPLBLD CKD-EPI 2021: 88 ML/MIN/1.73M2
EOSINOPHIL # BLD AUTO: 0.11 K/UL
EOSINOPHIL NFR BLD AUTO: 1.7 %
GLUCOSE SERPL-MCNC: 113 MG/DL
HCT VFR BLD CALC: 38 %
HGB BLD-MCNC: 12.1 G/DL
IMM GRANULOCYTES NFR BLD AUTO: 0.2 %
LYMPHOCYTES # BLD AUTO: 1.99 K/UL
LYMPHOCYTES NFR BLD AUTO: 31.6 %
MAN DIFF?: NORMAL
MCHC RBC-ENTMCNC: 28.9 PG
MCHC RBC-ENTMCNC: 31.8 G/DL
MCV RBC AUTO: 90.7 FL
MONOCYTES # BLD AUTO: 0.59 K/UL
MONOCYTES NFR BLD AUTO: 9.4 %
NEUTROPHILS # BLD AUTO: 3.55 K/UL
NEUTROPHILS NFR BLD AUTO: 56.5 %
PLATELET # BLD AUTO: 269 K/UL
POTASSIUM SERPL-SCNC: 4.9 MMOL/L
PROT SERPL-MCNC: 7.1 G/DL
RBC # BLD: 4.19 M/UL
RBC # FLD: 13.1 %
SODIUM SERPL-SCNC: 141 MMOL/L
TSH SERPL-ACNC: 0.16 UIU/ML
WBC # FLD AUTO: 6.29 K/UL

## 2025-08-07 PROCEDURE — 93000 ELECTROCARDIOGRAM COMPLETE: CPT

## 2025-08-07 PROCEDURE — 99204 OFFICE O/P NEW MOD 45 MIN: CPT

## 2025-08-07 PROCEDURE — G2211 COMPLEX E/M VISIT ADD ON: CPT

## 2025-08-12 ENCOUNTER — APPOINTMENT (OUTPATIENT)
Dept: INTERNAL MEDICINE | Facility: CLINIC | Age: 65
End: 2025-08-12
Payer: COMMERCIAL

## 2025-08-12 VITALS
HEART RATE: 82 BPM | WEIGHT: 150 LBS | BODY MASS INDEX: 23.54 KG/M2 | HEIGHT: 67 IN | TEMPERATURE: 98.2 F | OXYGEN SATURATION: 95 % | SYSTOLIC BLOOD PRESSURE: 120 MMHG | DIASTOLIC BLOOD PRESSURE: 70 MMHG

## 2025-08-12 DIAGNOSIS — M79.89 OTHER SPECIFIED SOFT TISSUE DISORDERS: ICD-10-CM

## 2025-08-12 DIAGNOSIS — M79.673 PAIN IN UNSPECIFIED FOOT: ICD-10-CM

## 2025-08-12 PROCEDURE — 99214 OFFICE O/P EST MOD 30 MIN: CPT

## 2025-08-12 RX ORDER — DOXYCYCLINE HYCLATE 100 MG/1
100 TABLET, COATED ORAL TWICE DAILY
Qty: 14 | Refills: 0 | Status: DISCONTINUED | COMMUNITY
Start: 2025-08-04 | End: 2025-08-12

## 2025-08-12 RX ORDER — FUROSEMIDE 20 MG/1
20 TABLET ORAL
Qty: 3 | Refills: 0 | Status: DISCONTINUED | COMMUNITY
Start: 2025-08-04 | End: 2025-08-12

## 2025-08-18 ENCOUNTER — APPOINTMENT (OUTPATIENT)
Dept: VASCULAR SURGERY | Facility: CLINIC | Age: 65
End: 2025-08-18
Payer: COMMERCIAL

## 2025-08-18 VITALS
WEIGHT: 150 LBS | HEIGHT: 67 IN | BODY MASS INDEX: 23.54 KG/M2 | HEART RATE: 76 BPM | SYSTOLIC BLOOD PRESSURE: 123 MMHG | OXYGEN SATURATION: 97 % | DIASTOLIC BLOOD PRESSURE: 80 MMHG

## 2025-08-18 DIAGNOSIS — I83.813 VARICOSE VEINS OF BILATERAL LOWER EXTREMITIES WITH PAIN: ICD-10-CM

## 2025-08-18 PROCEDURE — 99214 OFFICE O/P EST MOD 30 MIN: CPT

## 2025-08-18 PROCEDURE — 93971 EXTREMITY STUDY: CPT | Mod: RT

## 2025-08-22 ENCOUNTER — APPOINTMENT (OUTPATIENT)
Dept: CT IMAGING | Facility: CLINIC | Age: 65
End: 2025-08-22

## 2025-08-22 ENCOUNTER — OUTPATIENT (OUTPATIENT)
Dept: OUTPATIENT SERVICES | Facility: HOSPITAL | Age: 65
LOS: 1 days | End: 2025-08-22
Payer: COMMERCIAL

## 2025-08-22 DIAGNOSIS — R07.89 OTHER CHEST PAIN: ICD-10-CM

## 2025-08-22 DIAGNOSIS — Z00.8 ENCOUNTER FOR OTHER GENERAL EXAMINATION: ICD-10-CM

## 2025-08-22 PROCEDURE — 75574 CT ANGIO HRT W/3D IMAGE: CPT

## 2025-08-22 PROCEDURE — 75574 CT ANGIO HRT W/3D IMAGE: CPT | Mod: 26

## 2025-09-08 ENCOUNTER — APPOINTMENT (OUTPATIENT)
Dept: CARDIOLOGY | Facility: CLINIC | Age: 65
End: 2025-09-08
Payer: MEDICAID

## 2025-09-08 PROCEDURE — 93306 TTE W/DOPPLER COMPLETE: CPT

## 2025-09-11 ENCOUNTER — APPOINTMENT (OUTPATIENT)
Dept: ENDOCRINOLOGY | Facility: CLINIC | Age: 65
End: 2025-09-11
Payer: MEDICAID

## 2025-09-11 ENCOUNTER — APPOINTMENT (OUTPATIENT)
Dept: NEUROLOGY | Facility: CLINIC | Age: 65
End: 2025-09-11
Payer: MEDICAID

## 2025-09-11 VITALS
RESPIRATION RATE: 15 BRPM | HEART RATE: 84 BPM | BODY MASS INDEX: 23.86 KG/M2 | WEIGHT: 152 LBS | DIASTOLIC BLOOD PRESSURE: 79 MMHG | SYSTOLIC BLOOD PRESSURE: 139 MMHG | HEIGHT: 67 IN

## 2025-09-11 VITALS
OXYGEN SATURATION: 97 % | DIASTOLIC BLOOD PRESSURE: 70 MMHG | HEART RATE: 96 BPM | BODY MASS INDEX: 23.86 KG/M2 | WEIGHT: 152 LBS | HEIGHT: 67 IN | SYSTOLIC BLOOD PRESSURE: 124 MMHG

## 2025-09-11 DIAGNOSIS — G89.29 DORSALGIA, UNSPECIFIED: ICD-10-CM

## 2025-09-11 DIAGNOSIS — M54.9 DORSALGIA, UNSPECIFIED: ICD-10-CM

## 2025-09-11 DIAGNOSIS — E03.9 HYPOTHYROIDISM, UNSPECIFIED: ICD-10-CM

## 2025-09-11 DIAGNOSIS — C73 MALIGNANT NEOPLASM OF THYROID GLAND: ICD-10-CM

## 2025-09-11 DIAGNOSIS — E78.5 HYPERLIPIDEMIA, UNSPECIFIED: ICD-10-CM

## 2025-09-11 DIAGNOSIS — R73.03 PREDIABETES.: ICD-10-CM

## 2025-09-11 PROCEDURE — G2211 COMPLEX E/M VISIT ADD ON: CPT | Mod: NC

## 2025-09-11 PROCEDURE — 99214 OFFICE O/P EST MOD 30 MIN: CPT

## 2025-09-11 RX ORDER — CELECOXIB 100 MG/1
100 CAPSULE ORAL TWICE DAILY
Qty: 30 | Refills: 1 | Status: ACTIVE | COMMUNITY
Start: 2025-09-11 | End: 1900-01-01